# Patient Record
Sex: MALE | Race: WHITE | Employment: OTHER | URBAN - METROPOLITAN AREA
[De-identification: names, ages, dates, MRNs, and addresses within clinical notes are randomized per-mention and may not be internally consistent; named-entity substitution may affect disease eponyms.]

---

## 2017-01-04 DIAGNOSIS — L71.9 ROSACEA: Primary | ICD-10-CM

## 2017-01-04 NOTE — TELEPHONE ENCOUNTER
Reason for Call:  Patient calling to see if he can get refill of his Doxycycline filled today - he is a  and will be leaving. Requesting a call if it has been filled or not.    Best phone number to reach pt at is: [phone]  Ok to leave a message with medical info? yes    Pharmacy preferred (if calling for a refill): [facility]

## 2017-01-05 NOTE — TELEPHONE ENCOUNTER
doxycycline (VIBRAMYCIN) 100 MG capsule  Last Written Prescription Date:  12/3/2016  Last Fill Quantity: 30,   # refills: 0  Last Office Visit with G, P or [facility] prescribing provider: 11/17/2015  Future Office visit:    Next 5 appointments (look out 90 days)     Jan 13, 2017 10:30 AM   Return Visit with LILIANA Tan CNP   [facility] ([facility])    [address] [facility] [address] [phone]                   Was told last fill he is due for an OV for a PX   Please call pt   Thank you   Maria C Gambino RN, BSN  [facility]

## 2017-01-06 RX ORDER — DOXYCYCLINE 100 MG/1
100 CAPSULE ORAL 2 TIMES DAILY
Qty: 60 CAPSULE | Refills: 0 | Status: SHIPPED | OUTPATIENT
Start: 2017-01-06 | End: 2017-05-23

## 2017-01-06 NOTE — TELEPHONE ENCOUNTER
Called # [phone]    Left a non detailed VM     Maria C Gambino RN, BSN  [facility]

## 2017-01-06 NOTE — TELEPHONE ENCOUNTER
Pt called back in regards to message below.  I explained that the last time we saw him in office was 11/17/2015 and we need to see him once a year to be able to prescribe medications.  He is asking if his provider in [place] can fill this.  I told him I am not sure as she is with a specialty not primary care.  Please advise on if okay to be filled by provider at [facility].  He will be transferring call care to [place].  He can be reached at [phone].  OK to leave detailed message.  Maris Obrien  Patient

## 2017-01-06 NOTE — TELEPHONE ENCOUNTER
I do not prescribe anything other than what is diabetes related which does include his cholesterol and blood pressure meds.  I am willing to give him a one-time, one month supply to give him time to schedule an appointment with his primary care provider.  He will need to see his PCP anyway because there are other meds that are prescribed by his PCP that he will need refills on.  Sherri Mayer NP  Endocrinology

## 2017-01-09 NOTE — TELEPHONE ENCOUNTER
Left message for pt to call [facility] and a one mo only refill was sent to pharmacy.    I placed an appointment note on 1/13/17 OV note for Sherri's MA to verify pt received this message - that he needs to schedule with a PCP.   Abhinav Chery, GURINDER

## 2017-01-13 ENCOUNTER — OFFICE VISIT (OUTPATIENT)
Dept: ENDOCRINOLOGY | Facility: CLINIC | Age: 62
End: 2017-01-13
Payer: COMMERCIAL

## 2017-01-13 VITALS
DIASTOLIC BLOOD PRESSURE: 70 MMHG | WEIGHT: 268 LBS | RESPIRATION RATE: 14 BRPM | TEMPERATURE: 98.1 F | BODY MASS INDEX: 35.52 KG/M2 | HEIGHT: 73 IN | SYSTOLIC BLOOD PRESSURE: 138 MMHG | HEART RATE: 74 BPM

## 2017-01-13 DIAGNOSIS — Z79.4 TYPE 2 DIABETES MELLITUS WITH DIABETIC NEPHROPATHY, WITH LONG-TERM CURRENT USE OF INSULIN (H): Primary | ICD-10-CM

## 2017-01-13 DIAGNOSIS — E11.21 TYPE 2 DIABETES MELLITUS WITH DIABETIC NEPHROPATHY, WITH LONG-TERM CURRENT USE OF INSULIN (H): Primary | ICD-10-CM

## 2017-01-13 DIAGNOSIS — E78.5 HYPERLIPIDEMIA LDL GOAL <70: ICD-10-CM

## 2017-01-13 DIAGNOSIS — E11.21 TYPE 2 DIABETES MELLITUS WITH DIABETIC NEPHROPATHY, WITHOUT LONG-TERM CURRENT USE OF INSULIN (H): ICD-10-CM

## 2017-01-13 LAB — HBA1C MFR BLD: 8.4 % (ref 4.3–6)

## 2017-01-13 PROCEDURE — 83036 HEMOGLOBIN GLYCOSYLATED A1C: CPT | Performed by: CLINICAL NURSE SPECIALIST

## 2017-01-13 PROCEDURE — 36415 COLL VENOUS BLD VENIPUNCTURE: CPT | Performed by: CLINICAL NURSE SPECIALIST

## 2017-01-13 PROCEDURE — 80061 LIPID PANEL: CPT | Performed by: CLINICAL NURSE SPECIALIST

## 2017-01-13 PROCEDURE — 99213 OFFICE O/P EST LOW 20 MIN: CPT | Performed by: CLINICAL NURSE SPECIALIST

## 2017-01-13 RX ORDER — GLIPIZIDE 5 MG/1
10 TABLET, FILM COATED, EXTENDED RELEASE ORAL DAILY
Qty: 180 TABLET | Refills: 1 | Status: SHIPPED | OUTPATIENT
Start: 2017-01-13 | End: 2017-05-08

## 2017-01-13 NOTE — NURSING NOTE
"Chief Complaint   Patient presents with     RECHECK     diabetes       Initial /70 mmHg  Pulse 74  Temp(Src) 98.1  F (36.7  C)  Resp 14  Ht 1.854 m (6' 1\")  Wt 121.564 kg (268 lb)  BMI 35.37 kg/m2 Estimated body mass index is 35.37 kg/(m^2) as calculated from the following:    Height as of this encounter: 1.854 m (6' 1\").    Weight as of this encounter: 121.564 kg (268 lb).  BP completed using cuff size: irais Hoffman CMA        "

## 2017-01-13 NOTE — PROGRESS NOTES
Name: Francois Fontana  Seen at the request of Calixto Merino for Diabetes (Last seen 9/15/2016).  HPI:  Francois Fontana is a 61 year old male who presents for the evaluation/management of:    1. Type 2 DM:  Originally diagnosed in .  Was not symptomatic at the time, was diagnosed during routine labs.  Initially treated with glyburide, metformin was later added.  Actos was added 5 years ago.    Current Regimen: Metformin 1000 mg bid, glipizide XL 5 mg q am, Actos 45 mg qd, and Victoza 1.8 mg once daily.    BS checks: Not testing  Average Meter Download: didn't bring meter    Exercising daily - 30+ minutes.  Eating more bread again.    Complications:   Diabetes Complications  Description / Detail    Diabetic Retinopathy  No, last exam < 1 year ago   CAD / PAD  No   Neuropathy  No   Nephropathy / Microalbuminuria  Yes: elevated urine microalbumin noted for the first time 2015   Gastroparesis  No   Hypoglycemia Unawareness  No     2. Hypertension: Blood Pressure today:   BP Readings from Last 3 Encounters:   17 138/70   09/15/16 124/68   16 138/62   .  Blood pressure medications include Lisinopril/HCTZ 40/25 mg and Amlodipine 5 mg qd.  Takes medications everyday without forgetting a dose.  Denies feeling lightheaded or dizzy.   3. Hyperlipidemia: Takes Simvastatin 20 mg qd for lipid control.  Denies muscle aches of pains.       4. Prevention:  Flu Shot- 2016  Pneumovax- 2006  Opthalmology-Yes: 2016  Dental-  ASA-No, excessive bruising from ASA  Smoking- No  PMH/PSH:  Past Medical History   Diagnosis Date     Type II or unspecified type diabetes mellitus without mention of complication, not stated as uncontrolled      Abstracted 02     Unspecified essential hypertension      Rosacea      Past Surgical History   Procedure Laterality Date     Hernia repair, inguinal rt/lt       right     Family Hx:  Family History   Problem Relation Age of Onset     CANCER Father       of  "lung cancer     C.A.D. Father      Mult bypass operations     Type 2 Diabetes Father      Prostate Cancer Father      Respiratory Mother       of COPD     GASTROINTESTINAL DISEASE Brother      Hemochromatosis     GASTROINTESTINAL DISEASE Brother      Hemochromatosis     GASTROINTESTINAL DISEASE Brother      Hemochromatosis ( in 3 bros)     Thyroid disease:         DM2:         Autoimmune: DM1, SLE, RA, Vitiligo     Social Hx:  Social History     Social History     Marital Status:      Spouse Name: N/A     Number of Children: N/A     Years of Education: N/A     Occupational History     Not on file.     Social History Main Topics     Smoking status: Former Smoker     Quit date: 1985     Smokeless tobacco: Never Used      Comment: smoked socially     Alcohol Use: No      Comment: quit drinking age 30     Drug Use: No     Sexual Activity:     Partners: Female     Other Topics Concern     Parent/Sibling W/ Cabg, Mi Or Angioplasty Before 65f 55m? Yes     Social History Narrative          MEDICATIONS:  has a current medication list which includes the following prescription(s): glipizide, blood glucose monitoring, doxycycline, lisinopril-hydrochlorothiazide, amlodipine, pioglitazone, metformin, insulin pen needle, liraglutide, simvastatin, blood glucose monitoring, order for dme, triamcinolone, metronidazole, aspirin not prescribed, and triamcinolone.    ROS     ROS: 10 point ROS neg other than the symptoms noted above in the HPI.    Physical Exam   VS: /70 mmHg  Pulse 74  Temp(Src) 98.1  F (36.7  C)  Resp 14  Ht 1.854 m (6' 1\")  Wt 121.564 kg (268 lb)  BMI 35.37 kg/m2  GENERAL: NAD, well dressed, answering questions appropriately, appears stated age.  HEENT: OP clear, no exopthalmous, no proptosis, EOMI, no lig lag, no retraction, no scleral icterus  RESPIRATORY: Normal respiratory effort.  CARDIOVASCULAR: No peripheral edema.  EXTREMITIES: no edema, +pulses, no rashes, no lesions  NEUROLOGY: " CN grossly intact, no tremors  MSK: grossly intact, No digital cyanosis. Normal gait and station.  SKIN: no rashes, no lesions, no ulcers  PSYCH: Intact judgment and insight. A&OX3 with a cordial affect.    LABS:  A1c:   Component      Latest Ref Rng 1/15/2016 5/6/2016 9/15/2016 1/13/2017   Hemoglobin A1C      4.3 - 6.0 % 7.3 (H) 6.5 (H) 7.6 (H) 8.4 (H)     CMP:  !COMPREHENSIVE Latest Ref Rng 5/28/2015 5/6/2016   SODIUM 133 - 144 mmol/L 137 138   POTASSIUM 3.4 - 5.3 mmol/L 4.2 4.5   CHLORIDE 94 - 109 mmol/L 105 107   BUN 7 - 30 mg/dL 23 22   Creatinine 0.66 - 1.25 mg/dL 1.14 0.95   Glucose 70 - 99 mg/dL 164 (H) 120 (H)   ANION GAP 3 - 14 mmol/L 10 7   CALCIUM 8.5 - 10.1 mg/dL 8.8 8.8     LFTS/Cholesterol Panel:  !LIPID/HEPATIC Latest Ref Rng 12/19/2013 11/17/2015   CHOLESTEROL <200 mg/dL 154 149   TRIGLYCERIDES 0 - 150 mg/dL 221 (H) 196 (H)   HDL CHOLESTEROL >40 mg/dL 39 (L) 40 (L)   LDL CHOLESTEROL, CALCULATED 0 - 129 mg/dL 71 70   VLDL-CHOLESTEROL 0 - 30 mg/dL 44 (H) Pending   CHOLESTEROL/HDL RATIO 0.0 - 5.0 4.0 Pending   AST 0 - 55 U/L     ALT 0 - 70 U/L       Thyroid Function:   !THYROID Latest Ref Rng 5/6/2016 5/28/2015 6/12/2013 11/4/2010   TSH 0.40 - 4.00 mU/L 1.59 0.94 1.60 1.12     Urine MicroAlbumin:  Component    Latest Ref Rng 12/19/2013 5/28/2015 5/6/2016   Creatinine Urine     128 136 100   Microalbumin Urine     19 72 31   Microalbumin mg/g Cr    0 - 17 mg/g Cr 14.84 52.72 (H) 31.40 (H)     Vitamin D:    All pertinent notes, labs, and images personally reviewed by me.     A/P  Mr.Raymond COLETTE Fontana is a 61 year old here for the evaluation/management of diabetes:    1. DM2 - Unontrolled, today's A1c increased 6.5--> 7.6-->8.4%.   He's a  so he wants to avoid insulin if possible.  Will increase glipizide to 10 mg qd..  Continue current dose of Metformin, actos, and Victoza.   Encouraged more diligent dietary efforts - controlled carbs, limit between meal snacks.    There is some variability  among people, most will usually develop symptoms suggestive of hypoglycemia when blood glucose levels are lowered to the mid 60's. The first set of symptoms are called adrenergic. Patients may experience any of the following nervousness, sweating, intense hunger, trembling, weakness, palpitations, and difficulty speaking.   The acute management of hypoglycemia involves the rapid delivery of a source of easily absorbed sugar. Regular soda, juice, lifesavers, table sugar, are good options. 15 grams of glucose is the dose that is given, followed by an assessment of symptoms and a blood glucose check if possible. If after 10 minutes there is no improvement, another 10-15 grams should be given. This can be repeated up to three times. The equivalency of 10-15 grams of glucose (approximate servings) are: 3-5 hard candies, 3 teaspoons of sugar, or 1/2 cup of regular soda or juice.    4. Prevention:  Flu Shot- today  Pneumovax- 11/2/2006  Opthalmology-Yes: 11/17/2015  Dental-  ASA-No, excessive bruising from ASA  Smoking- No  2. Hypertension - Controlled.  Currently treated with Lisinopril/HCTZ 40/25 mg and Amlodipine 5 mg qd.  Continue current medications.    3. Hyperlipidemia - Currently treated with Simvastatin 20 mg qd.    Labs ordered today:   Orders Placed This Encounter   Procedures     Hemoglobin A1c     Lipid panel reflex to direct LDL       Radiology/Consults ordered today: None    All questions were answered.  The patient indicates understanding of the above issues and agrees with the plan set forth.  Total face to face time equal to or greater than 20 minutes.       Follow-up:  follow up in 3 month(s)  He'll come into clinic in 1 month for blood glucose meter upload for my review.    Sherri Mayer NP  Endocrinology  [facility]  CC: Calixto Merino

## 2017-01-14 LAB
CHOLEST SERPL-MCNC: 99 MG/DL
HDLC SERPL-MCNC: 43 MG/DL
LDLC SERPL CALC-MCNC: 28 MG/DL
NONHDLC SERPL-MCNC: 56 MG/DL
TRIGL SERPL-MCNC: 140 MG/DL

## 2017-01-14 NOTE — PROGRESS NOTES
Quick Note:    Ray,  Your cholesterol number look good.  Your A1c is higher. I'm sure you can get it back down again with diet efforts.  Take care,  Sherri Mayer NP  Endocrinology    ______

## 2017-02-01 ENCOUNTER — TELEPHONE (OUTPATIENT)
Dept: OTHER | Facility: CLINIC | Age: 62
End: 2017-02-01

## 2017-02-14 DIAGNOSIS — E78.5 HYPERLIPIDEMIA LDL GOAL <70: ICD-10-CM

## 2017-02-14 NOTE — TELEPHONE ENCOUNTER
Simvastatin 20 mg tab      Last Written Prescription Date: 05/20/16  Last Fill Quantity: 90, # refills: 1  Last Office Visit with G, P or [facility] prescribing provider: 01/13/17 Sherri Mayer        Lab Results   Component Value Date    CHOL 99 01/13/2017     Lab Results   Component Value Date    HDL 43 01/13/2017     Lab Results   Component Value Date    LDL 28 01/13/2017     Lab Results   Component Value Date    TRIG 140 01/13/2017     Lab Results   Component Value Date    CHOLHDLRATIO 3.7 11/17/2015

## 2017-02-15 RX ORDER — SIMVASTATIN 20 MG
20 TABLET ORAL AT BEDTIME
Qty: 90 TABLET | Refills: 1 | Status: SHIPPED | OUTPATIENT
Start: 2017-02-15 | End: 2017-07-28

## 2017-02-16 DIAGNOSIS — L71.9 ROSACEA: ICD-10-CM

## 2017-02-16 RX ORDER — DOXYCYCLINE 100 MG/1
100 CAPSULE ORAL 2 TIMES DAILY
Qty: 60 CAPSULE | Refills: 0 | Status: CANCELLED | OUTPATIENT
Start: 2017-02-16

## 2017-02-16 NOTE — TELEPHONE ENCOUNTER
Doxycycline      Last Written Prescription Date: 1/06/17  Last Fill Quantity: 60,  # refills: 0   Last Office Visit with [facility], P or [facility] prescribing provider: 2/14/17 Madan Mayer

## 2017-02-17 NOTE — TELEPHONE ENCOUNTER
Fax to pharmacy.  Sherri gave one time refill last time but this is not Endo med.  This needs to be sent to his primary provider.  Med denied.  GURINDER Peña Jean M, RN        8:06 AM   Note      Left message for pt to call [facility] and a one mo only refill was sent to pharmacy.   I placed an appointment note on 1/13/17 OV note for Sherri's MA to verify pt received this message - that he needs to schedule with a PCP.   Abhinav Chery RN            January 6, 2017            3:24 PM   Sherri Mayer, LILIANA HEBERT routed this conversation to Maris Obrien Linda Diane, APRN CNP        3:19 PM   Note      I do not prescribe anything other than what is diabetes related which does include his cholesterol and blood pressure meds. I am willing to give him a one-time, one month supply to give him time to schedule an appointment with his primary care provider. He will need to see his PCP anyway because there are other meds that are prescribed by his PCP that he will need refills on.  Sherri Mayer NP  Endocrinology

## 2017-05-08 DIAGNOSIS — E11.21 TYPE 2 DIABETES MELLITUS WITH DIABETIC NEPHROPATHY, WITH LONG-TERM CURRENT USE OF INSULIN (H): ICD-10-CM

## 2017-05-08 DIAGNOSIS — E78.5 HYPERLIPIDEMIA LDL GOAL <70: ICD-10-CM

## 2017-05-08 DIAGNOSIS — I10 ESSENTIAL HYPERTENSION WITH GOAL BLOOD PRESSURE LESS THAN 140/90: ICD-10-CM

## 2017-05-08 DIAGNOSIS — Z79.4 TYPE 2 DIABETES MELLITUS WITH DIABETIC NEPHROPATHY, WITH LONG-TERM CURRENT USE OF INSULIN (H): ICD-10-CM

## 2017-05-08 NOTE — TELEPHONE ENCOUNTER
Amlodipine 5mg tab      Last Written Prescription Date: 12/06/16  Last Fill Quantity: 90, # refills: 1    [facility] Office Visit with [facility] or [facility] prescribing provider:  01/13/17   Future Office Visit:        BP Readings from Last 3 Encounters:   01/13/17 138/70   09/15/16 124/68   05/20/16 138/62     Lisinopril 20-12.5mg tab      Last Written Prescription Date: 12/06/16  Last Fill Quantity: 180, # refills: 1  Last Office Visit with [facility] or [facility] prescribing provider: 01/13/17       Potassium   Date Value Ref Range Status   05/06/2016 4.5 3.4 - 5.3 mmol/L Final     Creatinine   Date Value Ref Range Status   05/06/2016 0.95 0.66 - 1.25 mg/dL Final     BP Readings from Last 3 Encounters:   01/13/17 138/70   09/15/16 124/68   05/20/16 138/62

## 2017-05-08 NOTE — TELEPHONE ENCOUNTER
Pioglitazone 45mg tab         Last Written Prescription Date: 09/15/16  Last Fill Quantity: 90, # refills: 1  Last Office Visit with G, P or [facility] prescribing provider:  01/13/17        BP Readings from Last 3 Encounters:   01/13/17 138/70   09/15/16 124/68   05/20/16 138/62     Lab Results   Component Value Date    MICROL 31 05/06/2016     Lab Results   Component Value Date    UMALCR 31.40 05/06/2016     Creatinine   Date Value Ref Range Status   05/06/2016 0.95 0.66 - 1.25 mg/dL Final   ]  GFR Estimate   Date Value Ref Range Status   05/06/2016 81 >60 mL/min/1.7m2 Final     Comment:     Non  GFR Calc   05/28/2015 66 >60 mL/min/1.7m2 Final     Comment:     Non  GFR Calc   06/17/2014 77 >60 mL/min/1.7m2 Final     GFR Estimate If Black   Date Value Ref Range Status   05/06/2016 >90   GFR Calc   >60 mL/min/1.7m2 Final   05/28/2015 80 >60 mL/min/1.7m2 Final     Comment:      GFR Calc   06/17/2014 >90 >60 mL/min/1.7m2 Final     Lab Results   Component Value Date    CHOL 99 01/13/2017     Lab Results   Component Value Date    HDL 43 01/13/2017     Lab Results   Component Value Date    LDL 28 01/13/2017     Lab Results   Component Value Date    TRIG 140 01/13/2017     Lab Results   Component Value Date    CHOLHDLRATIO 3.7 11/17/2015     Lab Results   Component Value Date    AST 20 05/06/2016     Lab Results   Component Value Date    ALT 36 05/06/2016     Lab Results   Component Value Date    A1C 8.4 01/13/2017    A1C 7.6 09/15/2016    A1C 6.5 05/06/2016    A1C 7.3 01/15/2016    A1C 8.3 12/18/2015     Potassium   Date Value Ref Range Status   05/06/2016 4.5 3.4 - 5.3 mmol/L Final

## 2017-05-08 NOTE — TELEPHONE ENCOUNTER
Easy touch 40dd7-04 mis      Last Written Prescription Date: 09/15/16  Last Fill Quantity: 100,  # refills: 2   Last Office Visit with [facility], [facility] or [facility] prescribing provider: 01/13/17

## 2017-05-08 NOTE — LETTER
[facility]  [address] [place], [address]  [phone]        May 11, 2017    Francois Fontana                                                                                                                              [address]  [facility] [address]            We recently received a call from your pharmacy requesting a refill of multiple.     A review of your chart indicates that an appointment is required with your provider for diabetic visit per Emily Baltazar. Please call the clinic at [phone] to schedule your appointment.     We have authorized a one month refill of your medication to allow time for you to schedule your appointment.      Taking care of your health is important to us and ongoing visits with your provider are vital to your care.  We look forward to seeing you in the near future.     Sincerely,     [facility]

## 2017-05-08 NOTE — TELEPHONE ENCOUNTER
Glipizide 5mg tab         Last Written Prescription Date: 01/13/17  Last Fill Quantity: 180, # refills: 1  Last Office Visit with G, P or [facility] prescribing provider:  01/13/17        BP Readings from Last 3 Encounters:   01/13/17 138/70   09/15/16 124/68   05/20/16 138/62     Lab Results   Component Value Date    MICROL 31 05/06/2016     Lab Results   Component Value Date    UMALCR 31.40 05/06/2016     Creatinine   Date Value Ref Range Status   05/06/2016 0.95 0.66 - 1.25 mg/dL Final   ]  GFR Estimate   Date Value Ref Range Status   05/06/2016 81 >60 mL/min/1.7m2 Final     Comment:     Non  GFR Calc   05/28/2015 66 >60 mL/min/1.7m2 Final     Comment:     Non  GFR Calc   06/17/2014 77 >60 mL/min/1.7m2 Final     GFR Estimate If Black   Date Value Ref Range Status   05/06/2016 >90   GFR Calc   >60 mL/min/1.7m2 Final   05/28/2015 80 >60 mL/min/1.7m2 Final     Comment:      GFR Calc   06/17/2014 >90 >60 mL/min/1.7m2 Final     Lab Results   Component Value Date    CHOL 99 01/13/2017     Lab Results   Component Value Date    HDL 43 01/13/2017     Lab Results   Component Value Date    LDL 28 01/13/2017     Lab Results   Component Value Date    TRIG 140 01/13/2017     Lab Results   Component Value Date    CHOLHDLRATIO 3.7 11/17/2015     Lab Results   Component Value Date    AST 20 05/06/2016     Lab Results   Component Value Date    ALT 36 05/06/2016     Lab Results   Component Value Date    A1C 8.4 01/13/2017    A1C 7.6 09/15/2016    A1C 6.5 05/06/2016    A1C 7.3 01/15/2016    A1C 8.3 12/18/2015     Potassium   Date Value Ref Range Status   05/06/2016 4.5 3.4 - 5.3 mmol/L Final

## 2017-05-09 NOTE — TELEPHONE ENCOUNTER
Routing refill request to provider for review/approval because:  Labs out of range:  A1c recommended q 3 mo when over 8  Labs not current:  K+ creat, microalb, GFR, AST, ALT, A1c    Yue Duckworth RN, BS  Clinical Nurse Triage.

## 2017-05-10 RX ORDER — GLIPIZIDE 5 MG/1
10 TABLET, FILM COATED, EXTENDED RELEASE ORAL DAILY
Qty: 60 TABLET | Refills: 0 | Status: SHIPPED | OUTPATIENT
Start: 2017-05-10 | End: 2017-07-07

## 2017-05-10 RX ORDER — PIOGLITAZONEHYDROCHLORIDE 45 MG/1
45 TABLET ORAL DAILY
Qty: 30 TABLET | Refills: 0 | Status: SHIPPED | OUTPATIENT
Start: 2017-05-10 | End: 2017-07-07

## 2017-05-10 RX ORDER — LISINOPRIL AND HYDROCHLOROTHIAZIDE 12.5; 2 MG/1; MG/1
TABLET ORAL
Qty: 60 TABLET | Refills: 0 | Status: SHIPPED | OUTPATIENT
Start: 2017-05-10 | End: 2017-07-07

## 2017-05-10 RX ORDER — AMLODIPINE BESYLATE 5 MG/1
5 TABLET ORAL DAILY
Qty: 30 TABLET | Refills: 0 | Status: SHIPPED | OUTPATIENT
Start: 2017-05-10 | End: 2017-07-07

## 2017-05-10 NOTE — TELEPHONE ENCOUNTER
Please advise patient is due for follow up.  Provided one month supply of requested meds.  Can update prescriptions at his follow up visit.  Sherri Mayer NP  Endocrinology

## 2017-05-23 ENCOUNTER — OFFICE VISIT (OUTPATIENT)
Dept: FAMILY MEDICINE | Facility: CLINIC | Age: 62
End: 2017-05-23
Payer: COMMERCIAL

## 2017-05-23 VITALS
RESPIRATION RATE: 16 BRPM | BODY MASS INDEX: 36.28 KG/M2 | DIASTOLIC BLOOD PRESSURE: 70 MMHG | WEIGHT: 275 LBS | TEMPERATURE: 98.6 F | OXYGEN SATURATION: 97 % | SYSTOLIC BLOOD PRESSURE: 126 MMHG | HEART RATE: 93 BPM

## 2017-05-23 DIAGNOSIS — Z12.11 ENCOUNTER FOR SCREENING COLONOSCOPY: Primary | ICD-10-CM

## 2017-05-23 DIAGNOSIS — L71.9 ROSACEA: ICD-10-CM

## 2017-05-23 DIAGNOSIS — R21 RASH: ICD-10-CM

## 2017-05-23 PROCEDURE — 99213 OFFICE O/P EST LOW 20 MIN: CPT | Performed by: NURSE PRACTITIONER

## 2017-05-23 RX ORDER — METRONIDAZOLE 10 MG/G
GEL TOPICAL DAILY
Qty: 60 G | Refills: 11 | Status: SHIPPED | OUTPATIENT
Start: 2017-05-23 | End: 2021-04-19

## 2017-05-23 RX ORDER — TRIAMCINOLONE ACETONIDE 1 MG/G
CREAM TOPICAL
Qty: 15 G | Refills: 0 | Status: SHIPPED | OUTPATIENT
Start: 2017-05-23 | End: 2017-07-05

## 2017-05-23 RX ORDER — DOXYCYCLINE 100 MG/1
100 CAPSULE ORAL 2 TIMES DAILY
Qty: 180 CAPSULE | Refills: 3 | Status: SHIPPED | OUTPATIENT
Start: 2017-05-23 | End: 2020-06-26

## 2017-05-23 NOTE — PROGRESS NOTES
SUBJECTIVE:                                                    Francois Fontana is a 61 year old male who presents to clinic today for the following health issues:       Patient is here with concerns about rosacea. Diagnosed with the skin condition and about 6 years ago and has been taking doxycycline daily to keep  Symptoms under control. Patient has been out of medication for 1 week and has large  , erythematous patches on his face and forehead. Is requesting a refill of medication at this time.  Typically sees Dr. Merino in [place].  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         Problem list and histories reviewed & adjusted, as indicated.  Additional history: none    Patient Active Problem List   Diagnosis     Type 2 diabetes mellitus with hyperglycemia; A1c goal <7     Hypertension goal BP (blood pressure) < 140/90     Rosacea     Hyperlipidemia LDL goal <70     Stenosis of left subclavian artery (H)     Advanced directives, counseling/discussion     Family history of prostate cancer in father      Morbid obesity due to excess calories (H)     JUSTIN (obstructive sleep apnea)     Type 2 diabetes mellitus with diabetic nephropathy (H)     Past Surgical History:   Procedure Laterality Date     HERNIA REPAIR, INGUINAL RT/LT      right       Social History   Substance Use Topics     Smoking status: Former Smoker     Quit date: 1985     Smokeless tobacco: Never Used      Comment: smoked socially     Alcohol use No      Comment: quit drinking age 30     Family History   Problem Relation Age of Onset     CANCER Father       of lung cancer     C.A.D. Father      Mult bypass operations     Type 2 Diabetes Father      Prostate Cancer Father      Respiratory Mother       of COPD     GASTROINTESTINAL DISEASE Brother      Hemochromatosis     GASTROINTESTINAL  DISEASE Brother      Hemochromatosis     GASTROINTESTINAL DISEASE Brother      Hemochromatosis ( in 3 bros)           Reviewed and updated as needed this visit by clinical staff  Tobacco  Allergies  Meds  Problems  Med Hx  Surg Hx  Fam Hx  Soc Hx        Reviewed and updated as needed this visit by Provider  Allergies  Meds  Problems  Med Hx  Surg Hx  Fam Hx         ROS:  Constitutional, HEENT, cardiovascular, pulmonary, gi and gu systems are negative, except as otherwise noted.    OBJECTIVE:                                                    /70 (BP Location: Right arm, Patient Position: Chair, Cuff Size: Adult Large)  Pulse 93  Temp 98.6  F (37  C) (Oral)  Resp 16  Wt 275 lb (124.7 kg)  SpO2 97%  BMI 36.28 kg/m2  Body mass index is 36.28 kg/(m^2).  GENERAL: healthy, alert and no distress  RESP: lungs clear to auscultation - no rales, rhonchi or wheezes  CV: regular rate and rhythm, normal S1 S2, no S3 or S4, no murmur, click or rub, no peripheral edema and peripheral pulses strong  SKIN: erythematous lesions on bilateral cheeks and forehead. Area of erythematous scaling on lower legs.   PSYCH: mentation appears normal, affect normal/bright         ASSESSMENT/PLAN:                                                            1. Rosacea  Will  treat with what Dr. Merino has done in the past since effective at managing. .   - doxycycline (VIBRAMYCIN) 100 MG capsule; Take 1 capsule (100 mg) by mouth 2 times daily  Dispense: 180 capsule; Refill: 3  - metroNIDAZOLE (METROGEL) 1 % gel; Apply topically daily  Dispense: 60 g; Refill: 11    2. Encounter for screening colonoscopy  Patient has agreed to the FIT test.   - Fecal colorectal cancer screen FIT; Future    3. Rash   Triamcinolone cream for rash on lower legs.   - triamcinolone (KENALOG) 0.1 % cream; Apply sparingly to affected area three times daily for 14 days.  Dispense: 15 g; Refill: 0    Follow up with endocrine or PCP if no improvement.      Denia Pope, NP  [facility]

## 2017-05-23 NOTE — NURSING NOTE
"Chief Complaint   Patient presents with     Derm Problem       Initial /70 (BP Location: Right arm, Patient Position: Chair, Cuff Size: Adult Large)  Pulse 93  Temp 98.6  F (37  C) (Oral)  Resp 16  Wt 275 lb (124.7 kg)  SpO2 97%  BMI 36.28 kg/m2 Estimated body mass index is 36.28 kg/(m^2) as calculated from the following:    Height as of 1/13/17: 6' 1\" (1.854 m).    Weight as of this encounter: 275 lb (124.7 kg).  Medication Reconciliation: complete     Yazan Cancino CMA    Gave handout on FIT test on 5/23/2017.Yazan Cancino CMA, declined    "

## 2017-05-23 NOTE — MR AVS SNAPSHOT
After Visit Summary   5/23/2017    Francois Fontana    MRN: [id number]           Patient Information     Date Of Birth          1955        Visit Information        Provider Department      5/23/2017 1:30 PM Denia Pope NP [facility]        Today's Diagnoses     Encounter for screening colonoscopy    -  1    Rosacea        Rash           Follow-ups after your visit        Future tests that were ordered for you today     Open Future Orders        Priority Expected Expires Ordered    Fecal colorectal cancer screen FIT Routine 6/13/2017 8/15/2017 5/23/2017            Who to contact     If you have questions or need follow up information about today's clinic visit or your schedule please contact [facility] directly at [phone].  Normal or non-critical lab and imaging results will be communicated to you by [employer], letter or phone within 4 business days after the clinic has received the results. If you do not hear from us within 7 days, please contact the clinic through [employer] or phone. If you have a critical or abnormal lab result, we will notify you by phone as soon as possible.  Submit refill requests through [employer] or call your pharmacy and they will forward the refill request to us. Please allow 3 business days for your refill to be completed.          Additional Information About Your Visit        MyChart Information     [employer] gives you secure access to your electronic health record. If you see a primary care provider, you can also send messages to your care team and make appointments. If you have questions, please call your primary care clinic.  If you do not have a primary care provider, please call [phone] and they will assist you.        Care EveryWhere ID     This is your Care EveryWhere ID. This could be used by other organizations to access your [facility] medical records  [id number]        Your Vitals Were     Pulse Temperature Respirations  Pulse Oximetry BMI (Body Mass Index)       93 98.6  F (37  C) (Oral) 16 97% 36.28 kg/m2        Blood Pressure from Last 3 Encounters:   05/23/17 126/70   01/13/17 138/70   09/15/16 124/68    Weight from Last 3 Encounters:   05/23/17 275 lb (124.7 kg)   01/13/17 268 lb (121.6 kg)   09/15/16 252 lb (114.3 kg)                 Today's Medication Changes          These changes are accurate as of: 5/23/17  1:53 PM.  If you have any questions, ask your nurse or doctor.               These medicines have changed or have updated prescriptions.        Dose/Directions    * metroNIDAZOLE 0.75 % topical gel   Commonly known as:  METROGEL   This may have changed:  Another medication with the same name was added. Make sure you understand how and when to take each.   Used for:  Rosacea   Changed by:  Calixto Merino MD        APPLY TOPICALLY TWICE DAILY   Quantity:  45 g   Refills:  3       * metroNIDAZOLE 1 % gel   Commonly known as:  METROGEL   This may have changed:  You were already taking a medication with the same name, and this prescription was added. Make sure you understand how and when to take each.   Used for:  Rosacea   Changed by:  Denia Pope NP        Apply topically daily   Quantity:  60 g   Refills:  11       * triamcinolone 0.1 % cream   Commonly known as:  KENALOG   This may have changed:  Another medication with the same name was added. Make sure you understand how and when to take each.   Used for:  Dermatitis   Changed by:  Calixto Merino MD        Apply  topically 2 times daily as needed.   Quantity:  80 g   Refills:  3       * triamcinolone 0.1 % cream   Commonly known as:  KENALOG   This may have changed:  Another medication with the same name was added. Make sure you understand how and when to take each.   Used for:  Rosacea   Changed by:  Calixto Merino MD        APPLY EXTERNALLY TO AFFECTED AREA THREE TIMES DAILY AS NEEDED   Quantity:  80 g   Refills:  5       * triamcinolone 0.1 % cream    Commonly known as:  KENALOG   This may have changed:  You were already taking a medication with the same name, and this prescription was added. Make sure you understand how and when to take each.   Used for:  Rash   Changed by:  Denia Pope NP        Apply sparingly to affected area three times daily for 14 days.   Quantity:  15 g   Refills:  0       * Notice:  This list has 5 medication(s) that are the same as other medications prescribed for you. Read the directions carefully, and ask your doctor or other care provider to review them with you.         Where to get your medicines      Some of these will need a paper prescription and others can be bought over the counter.  Ask your nurse if you have questions.     Bring a paper prescription for each of these medications     doxycycline 100 MG capsule    metroNIDAZOLE 1 % gel    triamcinolone 0.1 % cream                Primary Care Provider Office Phone # Fax #    Denia Pope [phone]       [facility] [address] [place] [id number]        Thank you!     Thank you for choosing [facility]  for your care. Our goal is always to provide you with excellent care. Hearing back from our patients is one way we can continue to improve our services. Please take a few minutes to complete the written survey that you may receive in the mail after your visit with us. Thank you!             Your Updated Medication List - Protect others around you: Learn how to safely use, store and throw away your medicines at www.disposemymeds.org.          This list is accurate as of: 5/23/17  1:53 PM.  Always use your most recent med list.                   Brand Name Dispense Instructions for use    amLODIPine 5 MG tablet    NORVASC    30 tablet    Take 1 tablet (5 mg) by mouth daily       ASPIRIN NOT PRESCRIBED    INTENTIONAL    0 each    1 each continuous prn for other Antiplatelet medication not prescribed intentionally due to  bruising excessively       blood glucose monitoring lancets     3 Box    Use to test blood sugar 3 times daily or as directed.       blood glucose monitoring test strip    ACCU-CHEK SMARTVIEW    300 each    Use to test blood sugar 3 times daily or as directed.       doxycycline 100 MG capsule    VIBRAMYCIN    180 capsule    Take 1 capsule (100 mg) by mouth 2 times daily       glipiZIDE 5 MG 24 hr tablet    glipiZIDE XL    60 tablet    Take 2 tablets (10 mg) by mouth daily       insulin pen needle 31G X 5 MM    B-D U/F    100 each    Use 1 daily or as directed.       liraglutide 18 MG/3ML soln    VICTOZA    27 mL    Inject 1.8 mg Subcutaneous daily       lisinopril-hydrochlorothiazide 20-12.5 MG per tablet    PRINZIDE/ZESTORETIC    60 tablet    TAKE TWO TABLETS BY MOUTH ONCE DAILY       metFORMIN 1000 MG tablet    GLUCOPHAGE    360 tablet    Take 1 tablet (1,000 mg) by mouth 2 times daily (with meals)       * metroNIDAZOLE 0.75 % topical gel    METROGEL    45 g    APPLY TOPICALLY TWICE DAILY       * metroNIDAZOLE 1 % gel    METROGEL    60 g    Apply topically daily       order for DME      Patient Francois Fontana was set up at [place] on September 23, 2015. Patient received a [employer] DreamStation Auto CPAP Auto. Pressures were set at Auto 6 - 12 cm H2O.   Patient?s ramp is 5 cm H2O for 30 min and FLEX/EPR is A Flex.  Patient received a [employer] Mask name: Wisp  Nasal mask Size Small, Medium, Large, heated tubing and heated humidifier.  Patient is enrolled in the STM Program and does need to meet compliance. Patient has a follow up on 11/4/15 with Bennett Goltz, PA.   Myra Lmi (entered by Nitin Wilson)       pioglitazone 45 MG tablet    ACTOS    30 tablet    Take 1 tablet (45 mg) by mouth daily       simvastatin 20 MG tablet    ZOCOR    90 tablet    Take 1 tablet (20 mg) by mouth At Bedtime       * triamcinolone 0.1 % cream    KENALOG    80 g    Apply  topically 2 times daily as  needed.       * triamcinolone 0.1 % cream    KENALOG    80 g    APPLY EXTERNALLY TO AFFECTED AREA THREE TIMES DAILY AS NEEDED       * triamcinolone 0.1 % cream    KENALOG    15 g    Apply sparingly to affected area three times daily for 14 days.       * Notice:  This list has 5 medication(s) that are the same as other medications prescribed for you. Read the directions carefully, and ask your doctor or other care provider to review them with you.

## 2017-06-21 ENCOUNTER — TELEPHONE (OUTPATIENT)
Dept: FAMILY MEDICINE | Facility: CLINIC | Age: 62
End: 2017-06-21

## 2017-06-21 NOTE — TELEPHONE ENCOUNTER
Panel Management Review      Patient has the following on his problem list:     Diabetes    ASA: Failed    Last A1C  Lab Results   Component Value Date    A1C 8.4 01/13/2017    A1C 7.6 09/15/2016    A1C 6.5 05/06/2016    A1C 7.3 01/15/2016    A1C 8.3 12/18/2015     A1C tested: FAILED    Last LDL:    Lab Results   Component Value Date    CHOL 99 01/13/2017     Lab Results   Component Value Date    HDL 43 01/13/2017     Lab Results   Component Value Date    LDL 28 01/13/2017     Lab Results   Component Value Date    TRIG 140 01/13/2017     Lab Results   Component Value Date    CHOLHDLRATIO 3.7 11/17/2015     Lab Results   Component Value Date    NHDL 56 01/13/2017       Is the patient on a Statin? YES             Is the patient on Aspirin? NO    Medications     HMG CoA Reductase Inhibitors    simvastatin (ZOCOR) 20 MG tablet          Last three blood pressure readings:  BP Readings from Last 3 Encounters:   05/23/17 126/70   01/13/17 138/70   09/15/16 124/68       Date of last diabetes office visit: 11/18/2015     Tobacco History:     History   Smoking Status     Former Smoker     Quit date: 7/22/1985   Smokeless Tobacco     Never Used     Comment: smoked socially           IVD   ASA: FAILED    Last LDL:    Lab Results   Component Value Date    CHOL 99 01/13/2017     Lab Results   Component Value Date    HDL 43 01/13/2017     Lab Results   Component Value Date    LDL 28 01/13/2017     Lab Results   Component Value Date    TRIG 140 01/13/2017        Lab Results   Component Value Date    CHOLHDLRATIO 3.7 11/17/2015        Is the patient on a Statin? YES   Is the patient on Aspirin? NO                  Medications     HMG CoA Reductase Inhibitors    simvastatin (ZOCOR) 20 MG tablet          Last three blood pressure readings:  BP Readings from Last 3 Encounters:   05/23/17 126/70   01/13/17 138/70   09/15/16 124/68        Tobacco History:     History   Smoking Status     Former Smoker     Quit date: 7/22/1985    Smokeless Tobacco     Never Used     Comment: smoked socially       Hypertension   Last three blood pressure readings:  BP Readings from Last 3 Encounters:   05/23/17 126/70   01/13/17 138/70   09/15/16 124/68     Blood pressure: Passed    HTN Guidelines:  Age 18-59 BP range:  Less than 140/90  Age 60-85 with Diabetes:  Less than 140/90  Age 60-85 without Diabetes:  less than 150/90      Composite cancer screening  Chart review shows that this patient is due/due soon for the following Colonoscopy  Summary:    Patient is due/failing the following:   A1C and COLONOSCOPY. Pt needs a DM visit     Action needed:   Patient needs office visit for DM. and Patient needs referral/order: colonoscopy     Type of outreach:    Phone, left message for patient to call back.     Questions for provider review:    None                                                                                                                                    ROCK Negrete       Chart routed to  .

## 2017-06-21 NOTE — LETTER
[facility]          [address] [place] [address]                                                                                                       [phone]      Francois Fontana  [address] [facility] [address]      Dear Francois,    Our records indicate that you may be due for preventative health care services. Please make an appointment or call to set up the following tests as recommended. If you have already had this testing done at another clinic please contact us to help us update our records to reflect the preventative care that you have had.    Colon cancer screen (colonoscopy) - Recommended every ten years for everyone age 50 and older. Screening is done by a colonoscopy every 10 years starting at age 50 or earlier if you have a family history of colon cancer.  If you cannot or do not want to have a colonoscopy you can opt to do an annual fecal occult blood immunoassay test (FIT stool test). We strongly urge our patients to consider having a colonoscopy done, which is the best screening test available and only needs to be done every 10 years if normal.     At this time you are also do for a diabetes physical.                                                                                                                                              Thank you for choosing [facility]. We appreciate the opportunity to serve you and look forward to supporting your healthcare needs in the future.    If you have any questions or concerns, please contact us at [phone]      Sincerely,        MINH Lopes

## 2017-07-05 DIAGNOSIS — R21 RASH: ICD-10-CM

## 2017-07-05 NOTE — TELEPHONE ENCOUNTER
Pending Prescriptions:                       Disp   Refills    triamcinolone (KENALOG) 0.1 % cream [Phar*       0            Sig: APPLY SPARINGLY TO AFFECTED AREA THREE TIMES           DAILY FOR 14 DAYS          Last Written Prescription Date: 05/23/2017  Last Fill Quantity: 15g,  # refills: 0   Last Office Visit with [facility], [facility] or [facility] prescribing provider: 05/23/2017                                             Calixto MATHIS

## 2017-07-07 DIAGNOSIS — E11.21 TYPE 2 DIABETES MELLITUS WITH DIABETIC NEPHROPATHY, WITH LONG-TERM CURRENT USE OF INSULIN (H): ICD-10-CM

## 2017-07-07 DIAGNOSIS — E78.5 HYPERLIPIDEMIA LDL GOAL <70: ICD-10-CM

## 2017-07-07 DIAGNOSIS — Z79.4 TYPE 2 DIABETES MELLITUS WITH DIABETIC NEPHROPATHY, WITH LONG-TERM CURRENT USE OF INSULIN (H): ICD-10-CM

## 2017-07-07 DIAGNOSIS — I10 ESSENTIAL HYPERTENSION WITH GOAL BLOOD PRESSURE LESS THAN 140/90: ICD-10-CM

## 2017-07-07 NOTE — TELEPHONE ENCOUNTER
Amlodipine 5 mg       Last Written Prescription Date: 05/10/17  Last Fill Quantity: 30, # refills: 0    Last Office Visit with [facility], [facility] or [facility] prescribing provider:  01/13/17 Sherri Mayer    Future Office Visit:        BP Readings from Last 3 Encounters:   05/23/17 126/70   01/13/17 138/70   09/15/16 124/68     Glipizide 5 mg          Last Written Prescription Date: 05/10/17  Last Fill Quantity: 60, # refills: 0  Last Office Visit with [facility], [facility] or [facility] prescribing provider:  01/13/17 Sherri Mayer         BP Readings from Last 3 Encounters:   05/23/17 126/70   01/13/17 138/70   09/15/16 124/68     Lab Results   Component Value Date    MICROL 31 05/06/2016     Lab Results   Component Value Date    UMALCR 31.40 05/06/2016     Creatinine   Date Value Ref Range Status   05/06/2016 0.95 0.66 - 1.25 mg/dL Final   ]  GFR Estimate   Date Value Ref Range Status   05/06/2016 81 >60 mL/min/1.7m2 Final     Comment:     Non  GFR Calc   05/28/2015 66 >60 mL/min/1.7m2 Final     Comment:     Non  GFR Calc   06/17/2014 77 >60 mL/min/1.7m2 Final     GFR Estimate If Black   Date Value Ref Range Status   05/06/2016 >90   GFR Calc   >60 mL/min/1.7m2 Final   05/28/2015 80 >60 mL/min/1.7m2 Final     Comment:      GFR Calc   06/17/2014 >90 >60 mL/min/1.7m2 Final     Lab Results   Component Value Date    CHOL 99 01/13/2017     Lab Results   Component Value Date    HDL 43 01/13/2017     Lab Results   Component Value Date    LDL 28 01/13/2017     Lab Results   Component Value Date    TRIG 140 01/13/2017     Lab Results   Component Value Date    CHOLHDLRATIO 3.7 11/17/2015     Lab Results   Component Value Date    AST 20 05/06/2016     Lab Results   Component Value Date    ALT 36 05/06/2016     Lab Results   Component Value Date    A1C 8.4 01/13/2017    A1C 7.6 09/15/2016    A1C 6.5 05/06/2016    A1C 7.3 01/15/2016    A1C 8.3 12/18/2015     Potassium   Date Value Ref Range  Status   05/06/2016 4.5 3.4 - 5.3 mmol/L Final     Lisinopril 20-12.5 mg       Last Written Prescription Date: 05/10/17  Last Fill Quantity: 60, # refills: 0  Last Office Visit with [facility], [facility] or [facility] prescribing provider: 01/13/17 Sherri Cortez        Potassium   Date Value Ref Range Status   05/06/2016 4.5 3.4 - 5.3 mmol/L Final     Creatinine   Date Value Ref Range Status   05/06/2016 0.95 0.66 - 1.25 mg/dL Final     BP Readings from Last 3 Encounters:   05/23/17 126/70   01/13/17 138/70   09/15/16 124/68     Pioglitazone 45 mg          Last Written Prescription Date: 05/10/17  Last Fill Quantity: 30, # refills: 0  Last Office Visit with [facility], [facility] or [facility] prescribing provider:  01/13/17 Sherri Cortez         BP Readings from Last 3 Encounters:   05/23/17 126/70   01/13/17 138/70   09/15/16 124/68     Lab Results   Component Value Date    MICROL 31 05/06/2016     Lab Results   Component Value Date    UMALCR 31.40 05/06/2016     Creatinine   Date Value Ref Range Status   05/06/2016 0.95 0.66 - 1.25 mg/dL Final   ]  GFR Estimate   Date Value Ref Range Status   05/06/2016 81 >60 mL/min/1.7m2 Final     Comment:     Non  GFR Calc   05/28/2015 66 >60 mL/min/1.7m2 Final     Comment:     Non  GFR Calc   06/17/2014 77 >60 mL/min/1.7m2 Final     GFR Estimate If Black   Date Value Ref Range Status   05/06/2016 >90   GFR Calc   >60 mL/min/1.7m2 Final   05/28/2015 80 >60 mL/min/1.7m2 Final     Comment:      GFR Calc   06/17/2014 >90 >60 mL/min/1.7m2 Final     Lab Results   Component Value Date    CHOL 99 01/13/2017     Lab Results   Component Value Date    HDL 43 01/13/2017     Lab Results   Component Value Date    LDL 28 01/13/2017     Lab Results   Component Value Date    TRIG 140 01/13/2017     Lab Results   Component Value Date    CHOLHDLRATIO 3.7 11/17/2015     Lab Results   Component Value Date    AST 20 05/06/2016     Lab Results   Component Value Date     ALT 36 05/06/2016     Lab Results   Component Value Date    A1C 8.4 01/13/2017    A1C 7.6 09/15/2016    A1C 6.5 05/06/2016    A1C 7.3 01/15/2016    A1C 8.3 12/18/2015     Potassium   Date Value Ref Range Status   05/06/2016 4.5 3.4 - 5.3 mmol/L Final

## 2017-07-07 NOTE — LETTER
[facility]  [address] [place], [address]  [phone]        July 10, 2017    Francois Fontana                                                                                                                              [id number]  [facility] [address]            We recently received a call from your pharmacy requesting a refill of multiple.     A review of your chart indicates that an appointment is required with your provider for a diabetic visit with Emily Baltazar. Please call the clinic at [phone] to schedule your appointment.     We have authorized one refill of your medication to allow time for you to schedule your appointment.      Taking care of your health is important to us and ongoing visits with your provider are vital to your care.  We look forward to seeing you in the near future.     Sincerely,     [facility]

## 2017-07-07 NOTE — TELEPHONE ENCOUNTER
Routing refill request to provider for review/approval because:  Pt not responding to messages.    Landen Aguillon RN, BSN

## 2017-07-08 NOTE — TELEPHONE ENCOUNTER
Previously filled by Endo.  Do you want to to continue or have PCP?  Routing refill request to provider for review/approval because:  Tia given x1 and patient did not follow up, please advisgokul Aguillon RN, BSN

## 2017-07-10 RX ORDER — PIOGLITAZONEHYDROCHLORIDE 45 MG/1
45 TABLET ORAL DAILY
Qty: 30 TABLET | Refills: 0 | Status: SHIPPED | OUTPATIENT
Start: 2017-07-10 | End: 2017-07-28

## 2017-07-10 RX ORDER — AMLODIPINE BESYLATE 5 MG/1
5 TABLET ORAL DAILY
Qty: 30 TABLET | Refills: 0 | Status: SHIPPED | OUTPATIENT
Start: 2017-07-10 | End: 2017-08-18

## 2017-07-10 RX ORDER — GLIPIZIDE 5 MG/1
10 TABLET, FILM COATED, EXTENDED RELEASE ORAL DAILY
Qty: 60 TABLET | Refills: 0 | Status: SHIPPED | OUTPATIENT
Start: 2017-07-10 | End: 2017-08-18 | Stop reason: DRUGHIGH

## 2017-07-10 RX ORDER — LISINOPRIL AND HYDROCHLOROTHIAZIDE 12.5; 2 MG/1; MG/1
TABLET ORAL
Qty: 60 TABLET | Refills: 0 | Status: SHIPPED | OUTPATIENT
Start: 2017-07-10 | End: 2017-08-18

## 2017-07-10 NOTE — TELEPHONE ENCOUNTER
Please call or send a letter that he is overdue for follow up and needs to schedule an appointment.  Sherri Mayer NP  Endocrinology

## 2017-07-11 RX ORDER — TRIAMCINOLONE ACETONIDE 1 MG/G
CREAM TOPICAL
Qty: 30 G | Refills: 0 | Status: SHIPPED | OUTPATIENT
Start: 2017-07-11 | End: 2020-06-26

## 2017-07-11 NOTE — TELEPHONE ENCOUNTER
2nd attempt. LVM to call clinic. Pt is due for DM visit and needs a referral for colonoscopy.  ROCK Negrete

## 2017-07-17 ENCOUNTER — TELEPHONE (OUTPATIENT)
Dept: FAMILY MEDICINE | Facility: CLINIC | Age: 62
End: 2017-07-17

## 2017-07-17 NOTE — TELEPHONE ENCOUNTER
Pt reports misplaced FIT kit, will stop by [facility] to  new kit in the next few days.   Abhinav Chery RN

## 2017-07-22 ENCOUNTER — HEALTH MAINTENANCE LETTER (OUTPATIENT)
Age: 62
End: 2017-07-22

## 2017-07-28 DIAGNOSIS — E11.21 TYPE 2 DIABETES MELLITUS WITH DIABETIC NEPHROPATHY, WITH LONG-TERM CURRENT USE OF INSULIN (H): ICD-10-CM

## 2017-07-28 DIAGNOSIS — I10 ESSENTIAL HYPERTENSION WITH GOAL BLOOD PRESSURE LESS THAN 140/90: ICD-10-CM

## 2017-07-28 DIAGNOSIS — E78.5 HYPERLIPIDEMIA LDL GOAL <70: ICD-10-CM

## 2017-07-28 DIAGNOSIS — E11.21 TYPE 2 DIABETES MELLITUS WITH DIABETIC NEPHROPATHY (H): ICD-10-CM

## 2017-07-28 DIAGNOSIS — Z79.4 TYPE 2 DIABETES MELLITUS WITH DIABETIC NEPHROPATHY, WITH LONG-TERM CURRENT USE OF INSULIN (H): ICD-10-CM

## 2017-07-31 ENCOUNTER — TELEPHONE (OUTPATIENT)
Dept: ENDOCRINOLOGY | Facility: CLINIC | Age: 62
End: 2017-07-31

## 2017-07-31 DIAGNOSIS — I10 HYPERTENSION GOAL BP (BLOOD PRESSURE) < 140/90: Primary | ICD-10-CM

## 2017-07-31 DIAGNOSIS — Z79.4 TYPE 2 DIABETES MELLITUS WITH DIABETIC NEPHROPATHY, WITH LONG-TERM CURRENT USE OF INSULIN (H): ICD-10-CM

## 2017-07-31 DIAGNOSIS — E11.21 TYPE 2 DIABETES MELLITUS WITH DIABETIC NEPHROPATHY, WITH LONG-TERM CURRENT USE OF INSULIN (H): ICD-10-CM

## 2017-07-31 RX ORDER — AMLODIPINE BESYLATE 5 MG/1
TABLET ORAL
Qty: 30 TABLET | Refills: 9 | Status: SHIPPED | OUTPATIENT
Start: 2017-07-31 | End: 2017-08-18

## 2017-07-31 RX ORDER — LIRAGLUTIDE 6 MG/ML
INJECTION SUBCUTANEOUS
Qty: 9 ML | Refills: 0 | Status: SHIPPED | OUTPATIENT
Start: 2017-07-31 | End: 2017-08-18

## 2017-07-31 RX ORDER — SIMVASTATIN 20 MG
TABLET ORAL
Qty: 90 TABLET | Refills: 1 | Status: SHIPPED | OUTPATIENT
Start: 2017-07-31 | End: 2017-08-18

## 2017-07-31 RX ORDER — PIOGLITAZONEHYDROCHLORIDE 45 MG/1
TABLET ORAL
Qty: 30 TABLET | Refills: 0 | Status: SHIPPED | OUTPATIENT
Start: 2017-07-31 | End: 2017-08-18

## 2017-07-31 RX ORDER — LISINOPRIL AND HYDROCHLOROTHIAZIDE 12.5; 2 MG/1; MG/1
TABLET ORAL
Qty: 60 TABLET | Refills: 0 | Status: SHIPPED | OUTPATIENT
Start: 2017-07-31 | End: 2017-08-18

## 2017-07-31 NOTE — TELEPHONE ENCOUNTER
simvastatin (ZOCOR) 20 MG tablet     Last Written Prescription Date: 2/15/2017  Last Fill Quantity: 90, # refills: 1  [place] [facility] [facility] or  Health prescribing provider: 5/23/2017  Next 5 appointments (look out 90 days)     Aug 11, 2017 11:30 AM CDT   Return Visit with LILIANA Tan CNP   [facility] ([facility]    [address]  [facility] [address]   [phone]                   Lab Results   Component Value Date    CHOL 99 01/13/2017     Lab Results   Component Value Date    HDL 43 01/13/2017     Lab Results   Component Value Date    LDL 28 01/13/2017     Lab Results   Component Value Date    TRIG 140 01/13/2017     Lab Results   Component Value Date    CHOLHDLRATIO 3.7 11/17/2015     lisinopril-hydrochlorothiazide (PRINZIDE/ZESTORETIC) 20-12.5 MG per tablet      Last Written Prescription Date: 7/10/2017  Last Fill Quantity: 60, # refills: 0  [place] [facility] [place] [facility] [facility] or  Health prescribing provider: 5/23/2017  Next 5 appointments (look out 90 days)     Aug 11, 2017 11:30 AM CDT   Return Visit with LILIANA Tan CNP   [facility] ([facility]    [address]  [facility] [address]   [phone]                   Potassium   Date Value Ref Range Status   05/06/2016 4.5 3.4 - 5.3 mmol/L Final     Creatinine   Date Value Ref Range Status   05/06/2016 0.95 0.66 - 1.25 mg/dL Final     BP Readings from Last 3 Encounters:   05/23/17 126/70   01/13/17 138/70   09/15/16 124/68     liraglutide (VICTOZA) 18 MG/3ML soln         Last Written Prescription Date: 9/15/2016  Last Fill Quantity: 27 mL, # refills: 1  Last [place] [facility] [place] [facility] [facility] or  Health prescribing provider:  5/23/2017   Next 5 appointments (look out 90 days)     Aug 11, 2017 11:30 AM CDT   Return Visit with LILIANA Tan CNP   [facility] ([facility]    [address]  [facility] [address]   [phone]                   BP Readings from Last 3 Encounters:   05/23/17 126/70   01/13/17 138/70   09/15/16 124/68     Lab Results   Component Value Date    MICROL 31 05/06/2016     Lab Results   Component Value Date    UMALCR 31.40 05/06/2016     Creatinine   Date Value Ref Range Status   05/06/2016 0.95 0.66 - 1.25 mg/dL Final   ]  GFR Estimate   Date Value Ref Range Status   05/06/2016 81 >60 mL/min/1.7m2 Final     Comment:     Non  GFR Calc   05/28/2015 66 >60 mL/min/1.7m2 Final     Comment:     Non  GFR Calc   06/17/2014 77 >60 mL/min/1.7m2 Final     GFR Estimate If Black   Date Value Ref Range Status   05/06/2016 >90   GFR Calc   >60 mL/min/1.7m2 Final   05/28/2015 80 >60 mL/min/1.7m2 Final     Comment:      GFR Calc   06/17/2014 >90 >60 mL/min/1.7m2 Final     Lab Results   Component Value Date    CHOL 99 01/13/2017     Lab Results   Component Value Date    HDL 43 01/13/2017     Lab Results   Component Value Date    LDL 28 01/13/2017     Lab Results   Component Value Date    TRIG 140 01/13/2017     Lab Results   Component Value Date    CHOLHDLRATIO 3.7 11/17/2015     Lab Results   Component Value Date    AST 20 05/06/2016     Lab Results   Component Value Date    ALT 36 05/06/2016     Lab Results   Component Value Date    A1C 8.4 01/13/2017    A1C 7.6 09/15/2016    A1C 6.5 05/06/2016    A1C 7.3 01/15/2016    A1C 8.3 12/18/2015     Potassium   Date Value Ref Range Status   05/06/2016 4.5 3.4 - 5.3 mmol/L Final     amLODIPine (NORVASC) 5 MG tablet      Last Written Prescription Date: 7/10/2017  Last Fill Quantity: 30, # refills: 0    Last Office Visit with [facility], [facility] or  Health prescribing provider:  5/23/2017   Future Office Visit:    Next 5 appointments (look out 90 days)     Aug 11, 2017 11:30 AM CDT   Return Visit with LILIANA Tan CNP   [facility] ([facility])     [address]  [place] [address]   [phone]                    BP Readings from Last 3 Encounters:   05/23/17 126/70   01/13/17 138/70   09/15/16 124/68     pioglitazone (ACTOS) 45 MG tablet         Last Written Prescription Date: 7/10/2017  Last Fill Quantity: 30, # refills: 0  Last Office Visit with [facility], [facility] or [facility] prescribing provider:  5/23/2017   Next 5 appointments (look out 90 days)     Aug 11, 2017 11:30 AM CDT   Return Visit with LILIANA Tan CNP   [facility] ([facility])    [address]  [facility] [address]   [phone]                   BP Readings from Last 3 Encounters:   05/23/17 126/70   01/13/17 138/70   09/15/16 124/68     Lab Results   Component Value Date    MICROL 31 05/06/2016     Lab Results   Component Value Date    UMALCR 31.40 05/06/2016     Creatinine   Date Value Ref Range Status   05/06/2016 0.95 0.66 - 1.25 mg/dL Final   ]  GFR Estimate   Date Value Ref Range Status   05/06/2016 81 >60 mL/min/1.7m2 Final     Comment:     Non  GFR Calc   05/28/2015 66 >60 mL/min/1.7m2 Final     Comment:     Non  GFR Calc   06/17/2014 77 >60 mL/min/1.7m2 Final     GFR Estimate If Black   Date Value Ref Range Status   05/06/2016 >90   GFR Calc   >60 mL/min/1.7m2 Final   05/28/2015 80 >60 mL/min/1.7m2 Final     Comment:      GFR Calc   06/17/2014 >90 >60 mL/min/1.7m2 Final     Lab Results   Component Value Date    CHOL 99 01/13/2017     Lab Results   Component Value Date    HDL 43 01/13/2017     Lab Results   Component Value Date    LDL 28 01/13/2017     Lab Results   Component Value Date    TRIG 140 01/13/2017     Lab Results   Component Value Date    CHOLHDLRATIO 3.7 11/17/2015     Lab Results   Component Value Date    AST 20 05/06/2016     Lab Results   Component Value Date    ALT 36 05/06/2016     Lab Results   Component Value Date    A1C 8.4 01/13/2017     A1C 7.6 09/15/2016    A1C 6.5 05/06/2016    A1C 7.3 01/15/2016    A1C 8.3 12/18/2015     Potassium   Date Value Ref Range Status   05/06/2016 4.5 3.4 - 5.3 mmol/L Final

## 2017-07-31 NOTE — TELEPHONE ENCOUNTER
Sherri-see below.  I T'd up A1c, BMP, Microalb and TSH.  What else would you like?  Rosio Nieves RN

## 2017-07-31 NOTE — TELEPHONE ENCOUNTER
Prescription approved per [facility] Refill Protocol.  Kaela Parisi, RN - BC

## 2017-07-31 NOTE — TELEPHONE ENCOUNTER
Wants to do Labs on 8/5 in anticipation of 8/11 appt - at first it seemed like patient might try to cancel appt with Sherri but I told him he needs appt in addition to the labs

## 2017-08-01 ENCOUNTER — DOCUMENTATION ONLY (OUTPATIENT)
Dept: LAB | Facility: CLINIC | Age: 62
End: 2017-08-01

## 2017-08-01 DIAGNOSIS — Z12.5 SPECIAL SCREENING FOR MALIGNANT NEOPLASM OF PROSTATE: Primary | ICD-10-CM

## 2017-08-01 NOTE — TELEPHONE ENCOUNTER
Lab orders signed, let Ray know he can schedule a lab appointment and I'll see him at his follow up visit.  Sherri Mayer NP  Endocrinology

## 2017-08-01 NOTE — PROGRESS NOTES
Please review, associate diagnosis and sign pending laboratory orders for patient upcoming appointment on 08/05/2017. Thank You.

## 2017-08-05 DIAGNOSIS — E11.21 TYPE 2 DIABETES MELLITUS WITH DIABETIC NEPHROPATHY, WITH LONG-TERM CURRENT USE OF INSULIN (H): ICD-10-CM

## 2017-08-05 DIAGNOSIS — Z12.11 ENCOUNTER FOR SCREENING COLONOSCOPY: ICD-10-CM

## 2017-08-05 DIAGNOSIS — I10 HYPERTENSION GOAL BP (BLOOD PRESSURE) < 140/90: ICD-10-CM

## 2017-08-05 DIAGNOSIS — Z12.5 SPECIAL SCREENING FOR MALIGNANT NEOPLASM OF PROSTATE: ICD-10-CM

## 2017-08-05 DIAGNOSIS — Z79.4 TYPE 2 DIABETES MELLITUS WITH DIABETIC NEPHROPATHY, WITH LONG-TERM CURRENT USE OF INSULIN (H): ICD-10-CM

## 2017-08-05 LAB
ALBUMIN SERPL-MCNC: 3.5 G/DL (ref 3.4–5)
ALP SERPL-CCNC: 70 U/L (ref 40–150)
ALT SERPL W P-5'-P-CCNC: 37 U/L (ref 0–70)
ANION GAP SERPL CALCULATED.3IONS-SCNC: 7 MMOL/L (ref 3–14)
AST SERPL W P-5'-P-CCNC: 14 U/L (ref 0–45)
BILIRUB SERPL-MCNC: 0.3 MG/DL (ref 0.2–1.3)
BUN SERPL-MCNC: 31 MG/DL (ref 7–30)
CALCIUM SERPL-MCNC: 9.2 MG/DL (ref 8.5–10.1)
CHLORIDE SERPL-SCNC: 106 MMOL/L (ref 94–109)
CO2 SERPL-SCNC: 23 MMOL/L (ref 20–32)
CREAT SERPL-MCNC: 0.9 MG/DL (ref 0.66–1.25)
CREAT UR-MCNC: 104 MG/DL
GFR SERPL CREATININE-BSD FRML MDRD: 86 ML/MIN/1.7M2
GLUCOSE SERPL-MCNC: 190 MG/DL (ref 70–99)
HBA1C MFR BLD: 8.2 % (ref 4.3–6)
MICROALBUMIN UR-MCNC: 59 MG/L
MICROALBUMIN/CREAT UR: 56.73 MG/G CR (ref 0–17)
POTASSIUM SERPL-SCNC: 4.9 MMOL/L (ref 3.4–5.3)
PROT SERPL-MCNC: 7 G/DL (ref 6.8–8.8)
PSA SERPL-ACNC: 0.42 UG/L (ref 0–4)
SODIUM SERPL-SCNC: 136 MMOL/L (ref 133–144)
TSH SERPL DL<=0.005 MIU/L-ACNC: 1.56 MU/L (ref 0.4–4)

## 2017-08-05 PROCEDURE — 83036 HEMOGLOBIN GLYCOSYLATED A1C: CPT | Performed by: CLINICAL NURSE SPECIALIST

## 2017-08-05 PROCEDURE — 82043 UR ALBUMIN QUANTITATIVE: CPT | Performed by: CLINICAL NURSE SPECIALIST

## 2017-08-05 PROCEDURE — 36415 COLL VENOUS BLD VENIPUNCTURE: CPT | Performed by: CLINICAL NURSE SPECIALIST

## 2017-08-05 PROCEDURE — 84443 ASSAY THYROID STIM HORMONE: CPT | Performed by: CLINICAL NURSE SPECIALIST

## 2017-08-05 PROCEDURE — G0103 PSA SCREENING: HCPCS | Performed by: CLINICAL NURSE SPECIALIST

## 2017-08-05 PROCEDURE — 80053 COMPREHEN METABOLIC PANEL: CPT | Performed by: CLINICAL NURSE SPECIALIST

## 2017-08-06 NOTE — PROGRESS NOTES
Ray,  Here's a copy of your recent lab results for your records.  We'll discuss your labs in detail you your upcoming appointment on 8/11.  Sherri Mayer NP  Endocrinology

## 2017-08-18 ENCOUNTER — OFFICE VISIT (OUTPATIENT)
Dept: ENDOCRINOLOGY | Facility: CLINIC | Age: 62
End: 2017-08-18
Payer: COMMERCIAL

## 2017-08-18 VITALS
WEIGHT: 276.7 LBS | SYSTOLIC BLOOD PRESSURE: 137 MMHG | DIASTOLIC BLOOD PRESSURE: 84 MMHG | BODY MASS INDEX: 36.51 KG/M2 | HEART RATE: 98 BPM | RESPIRATION RATE: 20 BRPM | TEMPERATURE: 98.1 F | OXYGEN SATURATION: 96 %

## 2017-08-18 DIAGNOSIS — E11.65 TYPE 2 DIABETES MELLITUS WITH HYPERGLYCEMIA, WITHOUT LONG-TERM CURRENT USE OF INSULIN (H): Primary | ICD-10-CM

## 2017-08-18 DIAGNOSIS — I10 ESSENTIAL HYPERTENSION WITH GOAL BLOOD PRESSURE LESS THAN 140/90: ICD-10-CM

## 2017-08-18 DIAGNOSIS — E78.5 HYPERLIPIDEMIA LDL GOAL <70: ICD-10-CM

## 2017-08-18 PROCEDURE — 99214 OFFICE O/P EST MOD 30 MIN: CPT | Performed by: CLINICAL NURSE SPECIALIST

## 2017-08-18 RX ORDER — LISINOPRIL AND HYDROCHLOROTHIAZIDE 12.5; 2 MG/1; MG/1
TABLET ORAL
Qty: 180 TABLET | Refills: 1 | Status: SHIPPED | OUTPATIENT
Start: 2017-08-18 | End: 2018-05-04

## 2017-08-18 RX ORDER — DOXYCYCLINE 100 MG/1
100 CAPSULE ORAL 2 TIMES DAILY
Qty: 180 CAPSULE | Refills: 3 | Status: CANCELLED | OUTPATIENT
Start: 2017-08-18

## 2017-08-18 RX ORDER — TRIAMCINOLONE ACETONIDE 1 MG/G
CREAM TOPICAL
Qty: 30 G | Refills: 0 | Status: CANCELLED | OUTPATIENT
Start: 2017-08-18

## 2017-08-18 RX ORDER — AMLODIPINE BESYLATE 5 MG/1
5 TABLET ORAL DAILY
Qty: 90 TABLET | Refills: 1 | Status: SHIPPED | OUTPATIENT
Start: 2017-08-18 | End: 2020-06-26

## 2017-08-18 RX ORDER — LANCETS
EACH MISCELLANEOUS
Qty: 100 EACH | Refills: 3 | Status: SHIPPED | OUTPATIENT
Start: 2017-08-18 | End: 2018-05-04

## 2017-08-18 RX ORDER — GLIPIZIDE 10 MG/1
20 TABLET, FILM COATED, EXTENDED RELEASE ORAL DAILY
Qty: 180 TABLET | Refills: 1 | Status: SHIPPED | OUTPATIENT
Start: 2017-08-18 | End: 2018-02-26

## 2017-08-18 RX ORDER — PIOGLITAZONEHYDROCHLORIDE 45 MG/1
45 TABLET ORAL DAILY
Qty: 90 TABLET | Refills: 1 | Status: SHIPPED | OUTPATIENT
Start: 2017-08-18 | End: 2018-03-15

## 2017-08-18 RX ORDER — METRONIDAZOLE 10 MG/G
GEL TOPICAL DAILY
Qty: 60 G | Refills: 11 | Status: CANCELLED | OUTPATIENT
Start: 2017-08-18

## 2017-08-18 RX ORDER — SIMVASTATIN 20 MG
TABLET ORAL
Qty: 90 TABLET | Refills: 1 | Status: SHIPPED | OUTPATIENT
Start: 2017-08-18 | End: 2020-07-14

## 2017-08-18 RX ORDER — GLIPIZIDE 5 MG/1
10 TABLET, FILM COATED, EXTENDED RELEASE ORAL DAILY
Qty: 60 TABLET | Refills: 0 | Status: CANCELLED | OUTPATIENT
Start: 2017-08-18

## 2017-08-18 RX ORDER — LIRAGLUTIDE 6 MG/ML
INJECTION SUBCUTANEOUS
Qty: 27 ML | Refills: 1 | Status: SHIPPED | OUTPATIENT
Start: 2017-08-18 | End: 2018-05-04

## 2017-08-18 NOTE — MR AVS SNAPSHOT
After Visit Summary   8/18/2017    Francois Fontana    MRN: [id number]           Patient Information     Date Of Birth          1955        Visit Information        Provider Department      8/18/2017 2:30 PM Sherri Mayer APRN [facility]        Today's Diagnoses     Type 2 diabetes mellitus with hyperglycemia, without long-term current use of insulin (H)    -  1    Hyperlipidemia LDL goal <70        Essential hypertension with goal blood pressure less than 140/90          Care Instructions      Your A1c improved a little to 8.2%  Component      Latest Ref Rng & Units 5/6/2016 9/15/2016 1/13/2017 8/5/2017   Hemoglobin A1C      4.3 - 6.0 % 6.5 (H) 7.6 (H) 8.4 (H) 8.2 (H)       Increase glipizide to 10 mg twice daily before breakfast or lunch and one before dinner.    Let me know if you have low blood sugars.     Follow up with me in 3 months.    Sherri Mayer NP  Endocrinology            Follow-ups after your visit        Follow-up notes from your care team     Return in about 3 months (around 11/18/2017).      Who to contact     If you have questions or need follow up information about today's clinic visit or your schedule please contact [facility] directly at [phone].  Normal or non-critical lab and imaging results will be communicated to you by HeySpacehart, letter or phone within 4 business days after the clinic has received the results. If you do not hear from us within 7 days, please contact the clinic through HeySpacehart or phone. If you have a critical or abnormal lab result, we will notify you by phone as soon as possible.  Submit refill requests through Calpano or call your pharmacy and they will forward the refill request to us. Please allow 3 business days for your refill to be completed.          Additional Information About Your Visit        HeySpaceharLoladex Information     Calpano gives you secure access to your electronic health record. If you see a  primary care provider, you can also send messages to your care team and make appointments. If you have questions, please call your primary care clinic.  If you do not have a primary care provider, please call [phone] and they will assist you.        Care EveryWhere ID     This is your Care EveryWhere ID. This could be used by other organizations to access your [place] medical records  [id number]        Your Vitals Were     Pulse Temperature Respirations Pulse Oximetry BMI (Body Mass Index)       98 98.1  F (36.7  C) (Oral) 20 96% 36.51 kg/m2        Blood Pressure from Last 3 Encounters:   08/18/17 137/84   05/23/17 126/70   01/13/17 138/70    Weight from Last 3 Encounters:   08/18/17 125.5 kg (276 lb 11.2 oz)   05/23/17 124.7 kg (275 lb)   01/13/17 121.6 kg (268 lb)              Today, you had the following     No orders found for display         Today's Medication Changes          These changes are accurate as of: 8/18/17 11:59 PM.  If you have any questions, ask your nurse or doctor.               These medicines have changed or have updated prescriptions.        Dose/Directions    amLODIPine 5 MG tablet   Commonly known as:  NORVASC   This may have changed:  See the new instructions.   Used for:  Hyperlipidemia LDL goal <70, Essential hypertension with goal blood pressure less than 140/90   Changed by:  Sherri Mayer APRN CNP        Dose:  5 mg   Take 1 tablet (5 mg) by mouth daily   Quantity:  90 tablet   Refills:  1       glipiZIDE 10 MG 24 hr tablet   Commonly known as:  glipiZIDE XL   This may have changed:    - medication strength  - how much to take   Used for:  Hyperlipidemia LDL goal <70, Essential hypertension with goal blood pressure less than 140/90   Changed by:  Sherri Mayer APRN CNP        Dose:  20 mg   Take 2 tablets (20 mg) by mouth daily   Quantity:  180 tablet   Refills:  1       liraglutide 18 MG/3ML soln   Commonly known as:  VICTOZA PEN   This may have changed:  See the  new instructions.   Used for:  Hyperlipidemia LDL goal <70, Essential hypertension with goal blood pressure less than 140/90   Changed by:  Sherri Mayer APRN CNP        INJECT 1.8MG SUBCUTANEOUSLY DAILY   Quantity:  27 mL   Refills:  1       lisinopril-hydrochlorothiazide 20-12.5 MG per tablet   Commonly known as:  PRINZIDE/ZESTORETIC   This may have changed:  See the new instructions.   Used for:  Hyperlipidemia LDL goal <70, Essential hypertension with goal blood pressure less than 140/90   Changed by:  Sherri Mayer APRN CNP        TAKE TWO TABLETS BY MOUTH ONCE DAILY   Quantity:  180 tablet   Refills:  1       metroNIDAZOLE 1 % gel   Commonly known as:  METROGEL   This may have changed:  Another medication with the same name was removed. Continue taking this medication, and follow the directions you see here.   Used for:  Rosacea   Changed by:  Denia Pope NP        Apply topically daily   Quantity:  60 g   Refills:  11       pioglitazone 45 MG tablet   Commonly known as:  ACTOS   This may have changed:  See the new instructions.   Used for:  Hyperlipidemia LDL goal <70, Essential hypertension with goal blood pressure less than 140/90   Changed by:  Sherri Mayer APRN CNP        Dose:  45 mg   Take 1 tablet (45 mg) by mouth daily   Quantity:  90 tablet   Refills:  1       simvastatin 20 MG tablet   Commonly known as:  ZOCOR   This may have changed:  See the new instructions.   Used for:  Hyperlipidemia LDL goal <70   Changed by:  Sherri Mayer APRN CNP        TAKE ONE TABLET BY MOUTH AT BEDTIME   Quantity:  90 tablet   Refills:  1       triamcinolone 0.1 % cream   Commonly known as:  KENALOG   This may have changed:  Another medication with the same name was removed. Continue taking this medication, and follow the directions you see here.   Used for:  Rash   Changed by:  Denia Pope NP        APPLY SPARINGLY TO AFFECTED AREA THREE TIMES DAILY FOR 14 DAYS   Quantity:  30 g    Refills:  0            Where to get your medicines      These medications were sent to [id number] - [place], [place] - [address] [facility]  [address] [facility] [address]     Phone:  [phone]     amLODIPine 5 MG tablet    blood glucose monitoring lancets    blood glucose monitoring test strip    glipiZIDE 10 MG 24 hr tablet    insulin pen needle 31G X 5 MM    liraglutide 18 MG/3ML soln    lisinopril-hydrochlorothiazide 20-12.5 MG per tablet    metFORMIN 1000 MG tablet    pioglitazone 45 MG tablet    simvastatin 20 MG tablet         Some of these will need a paper prescription and others can be bought over the counter.  Ask your nurse if you have questions.     You don't need a prescription for these medications     ASPIRIN NOT PRESCRIBED                Primary Care Provider Office Phone # Fax #    Denia Pope [phone]       [facility] [address] [address] [address]        Equal Access to Services     ZACHARIAH THOMAS : Hadii aad ku hadasho Soomaali, waaxda luqadaha, qaybta kaalmada adeegyada, waxay idiin hayaan matthew kharash brad . So [facility] [phone].    ATENCIÓN: Si habla español, tiene a dunham disposición servicios gratuitos de asistencia lingüística. Herminia al [phone].    We comply with applicable federal civil rights laws and [place] laws. We do not discriminate on the basis of race, color, national origin, age, disability sex, sexual orientation or gender identity.            Thank you!     Thank you for choosing [facility]  for your care. Our goal is always to provide you with excellent care. Hearing back from our patients is one way we can continue to improve our services. Please take a few minutes to complete the written survey that you may receive in the mail after your visit with us. Thank you!             Your Updated Medication List - Protect others around you: Learn how to safely use, store and throw away  your medicines at www.disposemymeds.org.          This list is accurate as of: 8/18/17 11:59 PM.  Always use your most recent med list.                   Brand Name Dispense Instructions for use Diagnosis    amLODIPine 5 MG tablet    NORVASC    90 tablet    Take 1 tablet (5 mg) by mouth daily    Hyperlipidemia LDL goal <70, Essential hypertension with goal blood pressure less than 140/90       ASPIRIN NOT PRESCRIBED    INTENTIONAL     1 each continuous prn for other Antiplatelet medication not prescribed intentionally due to bruising excessively    Hyperlipidemia LDL goal <70, Essential hypertension with goal blood pressure less than 140/90, Type 2 diabetes mellitus with hyperglycemia, without long-term current use of insulin (H)       blood glucose monitoring lancets     100 each    Use to test blood sugar 3 times daily or as directed.    Hyperlipidemia LDL goal <70, Essential hypertension with goal blood pressure less than 140/90, Type 2 diabetes mellitus with hyperglycemia, without long-term current use of insulin (H)       blood glucose monitoring test strip    ACCU-CHEK SMARTVIEW    300 each    Use to test blood sugar 3 times daily or as directed.    Hyperlipidemia LDL goal <70, Essential hypertension with goal blood pressure less than 140/90       doxycycline 100 MG capsule    VIBRAMYCIN    180 capsule    Take 1 capsule (100 mg) by mouth 2 times daily    Rosacea       glipiZIDE 10 MG 24 hr tablet    glipiZIDE XL    180 tablet    Take 2 tablets (20 mg) by mouth daily    Hyperlipidemia LDL goal <70, Essential hypertension with goal blood pressure less than 140/90       insulin pen needle 31G X 5 MM    B-D U/F    100 each    Use 1 daily or as directed.    Hyperlipidemia LDL goal <70, Essential hypertension with goal blood pressure less than 140/90       liraglutide 18 MG/3ML soln    VICTOZA PEN    27 mL    INJECT 1.8MG SUBCUTANEOUSLY DAILY    Hyperlipidemia LDL goal <70, Essential hypertension with goal blood  pressure less than 140/90       lisinopril-hydrochlorothiazide 20-12.5 MG per tablet    PRINZIDE/ZESTORETIC    180 tablet    TAKE TWO TABLETS BY MOUTH ONCE DAILY    Hyperlipidemia LDL goal <70, Essential hypertension with goal blood pressure less than 140/90       metFORMIN 1000 MG tablet    GLUCOPHAGE    360 tablet    Take 1 tablet (1,000 mg) by mouth 2 times daily (with meals)    Hyperlipidemia LDL goal <70, Essential hypertension with goal blood pressure less than 140/90       metroNIDAZOLE 1 % gel    METROGEL    60 g    Apply topically daily    Rosacea       order for DME      Patient Francois Fontana was set up at [facility] on September 23, 2015. Patient received a [employer] DreamStation Auto CPAP Auto. Pressures were set at Auto 6 - 12 cm H2O.   Patient?s ramp is 5 cm H2O for 30 min and FLEX/EPR is A Flex.  Patient received a [employer] Mask name: Wisp  Nasal mask Size Small, Medium, Large, heated tubing and heated humidifier.  Patient is enrolled in the [facility] Program and does need to meet compliance. Patient has a follow up on 11/4/15 with Bennett Goltz, PA.   Myra Lim (entered by Nitin Wilson)        pioglitazone 45 MG tablet    ACTOS    90 tablet    Take 1 tablet (45 mg) by mouth daily    Hyperlipidemia LDL goal <70, Essential hypertension with goal blood pressure less than 140/90       simvastatin 20 MG tablet    ZOCOR    90 tablet    TAKE ONE TABLET BY MOUTH AT BEDTIME    Hyperlipidemia LDL goal <70       triamcinolone 0.1 % cream    KENALOG    30 g    APPLY SPARINGLY TO AFFECTED AREA THREE TIMES DAILY FOR 14 DAYS    Rash

## 2017-08-18 NOTE — NURSING NOTE
"Chief Complaint   Patient presents with     Diabetes       Initial /84  Pulse 98  Temp 98.1  F (36.7  C) (Oral)  Resp 20  Wt 276 lb 11.2 oz (125.5 kg)  SpO2 96%  BMI 36.51 kg/m2 Estimated body mass index is 36.51 kg/(m^2) as calculated from the following:    Height as of 1/13/17: 6' 1\" (1.854 m).    Weight as of this encounter: 276 lb 11.2 oz (125.5 kg).  Medication Reconciliation: complete   Alexandra Day CMA (AAMA)      "

## 2017-08-18 NOTE — PROGRESS NOTES
Name: Francois Fontana  Seen at the request of Calixto Merino for Diabetes (Last seen 2017).  HPI:  Francois Fontana is a 62 year old male who presents for the evaluation/management of    1. Type 2 DM:  Originally diagnosed in .  Was not symptomatic at the time, was diagnosed during routine labs.  Initially treated with glyburide, metformin was later added.  Actos was last added 5 years ago.    Current Regimen: Metformin 1000 mg bid, glipizide XL 5 mg bid,  Actos 45 mg qd, and Victoza 1.8 mg once daily.    BS checks: Not testing  Average Meter Download: didn't bring meter    Exercising daily - 30+ minutes.  Eating more bread again.    Complications:   Diabetes Complications  Description / Detail    Diabetic Retinopathy  No, last exam < 1 year ago   CAD / PAD  No   Neuropathy  No   Nephropathy / Microalbuminuria  Yes: elevated urine microalbumin noted for the first time 2015   Gastroparesis  No   Hypoglycemia Unawareness  No     2. Hypertension: Blood Pressure today:   BP Readings from Last 3 Encounters:   17 137/84   17 126/70   17 138/70   .  Blood pressure medications include Lisinopril/HCTZ 40/25 mg and Amlodipine 5 mg qd.  Takes medications everyday without forgetting a dose.  Denies feeling lightheaded or dizzy.   3. Hyperlipidemia: Takes Simvastatin 20 mg qd for lipid control.  Denies muscle aches of pains.       4. Prevention:  Flu Shot- 2016  Pneumovax- 2006  Opthalmology-Yes: 2016  Dental-  ASA-No, excessive bruising from ASA  Smoking- No  PMH/PSH:  Past Medical History:   Diagnosis Date     Rosacea      Type II or unspecified type diabetes mellitus without mention of complication, not stated as uncontrolled     Abstracted 02     Unspecified essential hypertension      Past Surgical History:   Procedure Laterality Date     HERNIA REPAIR, INGUINAL RT/LT      right     Family Hx:  Family History   Problem Relation Age of Onset     CANCER Father        of lung cancer     C.A.D. Father      Mult bypass operations     Type 2 Diabetes Father      Prostate Cancer Father      Respiratory Mother       of COPD     GASTROINTESTINAL DISEASE Brother      Hemochromatosis     GASTROINTESTINAL DISEASE Brother      Hemochromatosis     GASTROINTESTINAL DISEASE Brother      Hemochromatosis ( in 3 bros)     Thyroid disease:         DM2:         Autoimmune: DM1, SLE, RA, Vitiligo     Social Hx:  Social History     Social History     Marital status:      Spouse name: N/A     Number of children: N/A     Years of education: N/A     Occupational History     Not on file.     Social History Main Topics     Smoking status: Former Smoker     Quit date: 1985     Smokeless tobacco: Never Used      Comment: smoked socially     Alcohol use No      Comment: quit drinking age 30     Drug use: No     Sexual activity: Yes     Partners: Female     Other Topics Concern     Parent/Sibling W/ Cabg, Mi Or Angioplasty Before 65f 55m? Yes     Social History Narrative          MEDICATIONS:  has a current medication list which includes the following prescription(s): pioglitazone, amlodipine, victoza pen, lisinopril-hydrochlorothiazide, simvastatin, triamcinolone, glipizide, doxycycline, metronidazole, insulin pen needle, blood glucose monitoring, metformin, blood glucose monitoring, order for dme, and aspirin not prescribed.    ROS     ROS: 10 point ROS neg other than the symptoms noted above in the HPI.    Physical Exam   VS: /84  Pulse 98  Temp 98.1  F (36.7  C) (Oral)  Resp 20  Wt 125.5 kg (276 lb 11.2 oz)  SpO2 96%  BMI 36.51 kg/m2  GENERAL: NAD, well dressed, answering questions appropriately, appears stated age.  HEENT: OP clear, no exopthalmous, no proptosis, EOMI, no lig lag, no retraction, no scleral icterus  RESPIRATORY: Normal respiratory effort.  CARDIOVASCULAR: No peripheral edema.  EXTREMITIES: no edema, +pulses, no rashes, no lesions  NEUROLOGY: CN grossly intact,  no tremors  MSK: grossly intact, No digital cyanosis. Normal gait and station.  SKIN: no rashes, no lesions, no ulcers  PSYCH: Intact judgment and insight. A&OX3 with a cordial affect.    LABS:  A1c:   Component      Latest Ref Rng & Units 5/6/2016 9/15/2016 1/13/2017 8/5/2017   Hemoglobin A1C      4.3 - 6.0 % 6.5 (H) 7.6 (H) 8.4 (H) 8.2 (H)     CMP:  !COMPREHENSIVE Latest Ref Rng 5/28/2015 5/6/2016   SODIUM 133 - 144 mmol/L 137 138   POTASSIUM 3.4 - 5.3 mmol/L 4.2 4.5   CHLORIDE 94 - 109 mmol/L 105 107   BUN 7 - 30 mg/dL 23 22   Creatinine 0.66 - 1.25 mg/dL 1.14 0.95   Glucose 70 - 99 mg/dL 164 (H) 120 (H)   ANION GAP 3 - 14 mmol/L 10 7   CALCIUM 8.5 - 10.1 mg/dL 8.8 8.8     LFTS/Cholesterol Panel:  !LIPID/HEPATIC Latest Ref Rng 12/19/2013 11/17/2015   CHOLESTEROL <200 mg/dL 154 149   TRIGLYCERIDES 0 - 150 mg/dL 221 (H) 196 (H)   HDL CHOLESTEROL >40 mg/dL 39 (L) 40 (L)   LDL CHOLESTEROL, CALCULATED 0 - 129 mg/dL 71 70   VLDL-CHOLESTEROL 0 - 30 mg/dL 44 (H) Pending   CHOLESTEROL/HDL RATIO 0.0 - 5.0 4.0 Pending   AST 0 - 55 U/L     ALT 0 - 70 U/L       Thyroid Function:   !THYROID Latest Ref Rng 5/6/2016 5/28/2015 6/12/2013 11/4/2010   TSH 0.40 - 4.00 mU/L 1.59 0.94 1.60 1.12     Urine MicroAlbumin:  Component    Latest Ref Rng 12/19/2013 5/28/2015 5/6/2016   Creatinine Urine     128 136 100   Microalbumin Urine     19 72 31   Microalbumin mg/g Cr    0 - 17 mg/g Cr 14.84 52.72 (H) 31.40 (H)     Vitamin D:    All pertinent notes, labs, and images personally reviewed by me.     A/P  Mr.Raymond COLETTE Fontana is a 62 year old here for the evaluation/management of diabetes:    1. DM2 - Unontrolled, today's A1c improved slightly 8.4--> 8.2%..   He's a  so he wants to avoid insulin if possible.  Increase glipizide to 10 mg bid.  There is some variability among people, most will usually develop symptoms suggestive of hypoglycemia when blood glucose levels are lowered to the mid 60's. The first set of symptoms are  called adrenergic. Patients may experience any of the following nervousness, sweating, intense hunger, trembling, weakness, palpitations, and difficulty speaking.   The acute management of hypoglycemia involves the rapid delivery of a source of easily absorbed sugar. Regular soda, juice, lifesavers, table sugar, are good options. 15 grams of glucose is the dose that is given, followed by an assessment of symptoms and a blood glucose check if possible. If after 10 minutes there is no improvement, another 10-15 grams should be given. This can be repeated up to three times. The equivalency of 10-15 grams of glucose (approximate servings) are: 3-5 hard candies, 3 teaspoons of sugar, or 1/2 cup of regular soda or juice.    4. Prevention:  Flu Shot- annually  Pneumovax- 11/2/2006  Opthalmology-Yes: 11/17/2015  Dental-  ASA-No, excessive bruising from ASA  Smoking- No  2. Hypertension - Controlled.  Currently treated with Lisinopril/HCTZ 40/25 mg and Amlodipine 5 mg qd.  Continue current medications.    3. Hyperlipidemia - Currently treated with Simvastatin 20 mg qd.    Labs ordered today:   No orders of the defined types were placed in this encounter.      Radiology/Consults ordered today: None    All questions were answered.  The patient indicates understanding of the above issues and agrees with the plan set forth.  Total face to face time equal to or greater than 25 minutes.       Follow-up:  follow up in 3 month(s)    Sherri Mayer NP  Endocrinology  [facility]  CC: Calixto Merino

## 2017-08-18 NOTE — PATIENT INSTRUCTIONS
Your A1c improved a little to 8.2%  Component      Latest Ref Rng & Units 5/6/2016 9/15/2016 1/13/2017 8/5/2017   Hemoglobin A1C      4.3 - 6.0 % 6.5 (H) 7.6 (H) 8.4 (H) 8.2 (H)       Increase glipizide to 10 mg twice daily before breakfast or lunch and one before dinner.    Let me know if you have low blood sugars.     Follow up with me in 3 months.    Sherri Mayer NP  Endocrinology

## 2017-08-19 PROCEDURE — 82274 ASSAY TEST FOR BLOOD FECAL: CPT | Performed by: FAMILY MEDICINE

## 2017-08-30 DIAGNOSIS — Z12.5 SPECIAL SCREENING FOR MALIGNANT NEOPLASM OF PROSTATE: ICD-10-CM

## 2017-08-30 DIAGNOSIS — Z12.11 ENCOUNTER FOR SCREENING COLONOSCOPY: ICD-10-CM

## 2017-08-30 LAB — HEMOCCULT STL QL IA: NEGATIVE

## 2018-01-30 ENCOUNTER — TELEPHONE (OUTPATIENT)
Dept: ENDOCRINOLOGY | Facility: CLINIC | Age: 63
End: 2018-01-30

## 2018-01-30 NOTE — TELEPHONE ENCOUNTER
I haven't seen him since 8/2017 so maybe it's better if he schedules a follow up.  If he knows what alternative are covered, he can bring the list with him (or maybe he can check with his insurance to see what the alternatives to Victoza are - would be Bydureon, or Trulicity, or Byetta).  Sherri Mayer NP  Endocrinology

## 2018-01-30 NOTE — TELEPHONE ENCOUNTER
Received fax from [facility], [facility]  Requesting PA on Victoza    Do you want PA submitted?    Yue Duckworth RN, BS  Clinical Nurse Triage.

## 2018-01-31 NOTE — TELEPHONE ENCOUNTER
Spoke with patient.  He states he is transitioning to [facility].  He will pursue his medication through them but states that if things don't go well, he will definitely be back to see us.  Aneta Breen M.A.

## 2018-02-26 DIAGNOSIS — I10 ESSENTIAL HYPERTENSION WITH GOAL BLOOD PRESSURE LESS THAN 140/90: ICD-10-CM

## 2018-02-26 DIAGNOSIS — E78.5 HYPERLIPIDEMIA LDL GOAL <70: ICD-10-CM

## 2018-02-27 NOTE — TELEPHONE ENCOUNTER
"Requested Prescriptions   Pending Prescriptions Disp Refills     glipiZIDE (GLUCOTROL XL) 10 MG 24 hr tablet [Pharmacy Med Name: GLIPIZIDE ER 10MG   TAB]  Last Written Prescription Date:  8/18/2017  Last Fill Quantity: 180 tablet,  # refills: 1   Last office visit: 8/18/2017 with prescribing provider:  Len    180 tablet 1     Sig: TAKE TWO TABLETS BY MOUTH ONCE DAILY    Sulfonylurea Agents Failed    2/26/2018  2:59 PM       Failed - Patient has documented LDL within the past 12 mos.    Recent Labs   Lab Test  01/13/17   1057   LDL  28          Failed - Patient has documented A1c within the specified period of time.    Recent Labs   Lab Test  08/05/17   0845   A1C  8.2*          Failed - Patient has had an appointment with authorizing provider within the past 6 mos. or  within next 30 days    Patient had office visit in the last 6 months or has a visit in the next 30 days with authorizing provider.  See \"Patient Info\" tab in inbasket, or \"Choose Columns\" in Meds & Orders section of the refill encounter.           Passed - Blood pressure less than 140/90 in past 6 months    BP Readings from Last 3 Encounters:   08/18/17 137/84   05/23/17 126/70   01/13/17 138/70          Passed - Patient has had a Microalbumin in the past 12 mos.    Recent Labs   Lab Test  08/05/17   0845   MICROL  59   UMALCR  56.73*          Passed - Patient is age 18 or older       Passed - Patient has a recent creatinine (normal) within the past 12 mos.    Recent Labs   Lab Test  08/05/17   0845   CR  0.90             "

## 2018-03-01 NOTE — TELEPHONE ENCOUNTER
Routing refill request to provider for review/approval because:  Labs out of range:  A1c  Labs not current:  A1c, lipids  Attempted to call pt, no answer, unable to leave message    Yue Duckworth RN, BS  Clinical Nurse Triage.

## 2018-03-02 RX ORDER — GLIPIZIDE 10 MG/1
20 TABLET, FILM COATED, EXTENDED RELEASE ORAL DAILY
Qty: 60 TABLET | Refills: 1 | Status: SHIPPED | OUTPATIENT
Start: 2018-03-02 | End: 2020-07-14

## 2018-03-15 DIAGNOSIS — I10 ESSENTIAL HYPERTENSION WITH GOAL BLOOD PRESSURE LESS THAN 140/90: ICD-10-CM

## 2018-03-15 DIAGNOSIS — E78.5 HYPERLIPIDEMIA LDL GOAL <70: ICD-10-CM

## 2018-03-15 RX ORDER — PIOGLITAZONEHYDROCHLORIDE 45 MG/1
TABLET ORAL
Qty: 30 TABLET | Refills: 0 | Status: SHIPPED | OUTPATIENT
Start: 2018-03-15 | End: 2018-06-06

## 2018-03-15 NOTE — TELEPHONE ENCOUNTER
"Requested Prescriptions   Pending Prescriptions Disp Refills     pioglitazone (ACTOS) 45 MG tablet [Pharmacy Med Name: PIOGLITAZONE 45MG TAB]      Last Written Prescription Date:  8/18/17  Last Fill Quantity: 90 tablet,  # refills: 1   Last office visit: 8/18/2017 with prescribing provider:  Moreno   Future Office Visit:     90 tablet 1     Sig: TAKE ONE TABLET BY MOUTH ONCE DAILY    Thiazolidinedione Agents (TZDs)  Failed    3/15/2018 11:21 AM       Failed - Blood pressure less than 140/90 in past 6 months    BP Readings from Last 3 Encounters:   08/18/17 137/84   05/23/17 126/70   01/13/17 138/70                Failed - Patient has documented LDL within the past 12 mos.    Recent Labs   Lab Test  01/13/17   1057   LDL  28            Failed - Patient has documented A1c within the specified period of time.    Recent Labs   Lab Test  08/05/17   0845   A1C  8.2*            Failed - Recent (6 mo) or future (30 days) visit within the authorizing provider's specialty    Patient had office visit in the last 6 months or has a visit in the next 30 days with authorizing provider or within the authorizing provider's specialty.  See \"Patient Info\" tab in inbasket, or \"Choose Columns\" in Meds & Orders section of the refill encounter.           Passed - Patient has a normal ALT within the past 12 mos.    Recent Labs   Lab Test  08/05/17   0845   ALT  37            Passed - Patient has a normal AST within the past 12 mos.     Recent Labs   Lab Test  08/05/17   0845   AST  14            Passed - Patient has had a Microalbumin in the past 12 mos.    Recent Labs   Lab Test  08/05/17   0845   MICROL  59   UMALCR  56.73*            Passed - Diagnosis not CHF       Passed - Patient is age 18 or older       Passed - Patient has a normal serum Creatinine in the past 12 months    Recent Labs   Lab Test  08/05/17   0845   CR  0.90             "

## 2018-03-15 NOTE — LETTER
[facility]  [address] [place], [address]  [phone]        March 15, 2018    Francois Fontana                                                                                                                              [id number]  [facility] [address]            We recently received a call from your pharmacy requesting a refill of Pioglitazone.     A review of your chart indicates that an appointment is required with your provider for diabetic visit with Emily Baltazar.  Fasting labs due this time also at visit. Please call the clinic at [phone] to schedule your appointment.     We have authorized one refill of your medication to allow time for you to schedule your appointment.      Taking care of your health is important to us and ongoing visits with your provider are vital to your care.  We look forward to seeing you in the near future.     Sincerely,     [facility]

## 2018-03-15 NOTE — TELEPHONE ENCOUNTER
Due for visit.  Also due for fasting and non-fasting labs.  One month sent.  Letter sent.  Rosio Nieves RN

## 2018-05-04 DIAGNOSIS — E78.5 HYPERLIPIDEMIA LDL GOAL <70: ICD-10-CM

## 2018-05-04 DIAGNOSIS — I10 ESSENTIAL HYPERTENSION WITH GOAL BLOOD PRESSURE LESS THAN 140/90: ICD-10-CM

## 2018-05-04 DIAGNOSIS — E11.65 TYPE 2 DIABETES MELLITUS WITH HYPERGLYCEMIA, WITHOUT LONG-TERM CURRENT USE OF INSULIN (H): ICD-10-CM

## 2018-05-04 NOTE — TELEPHONE ENCOUNTER
3/15/18- one month Actos sent and letter sent.  No upcoming visit.  T'd up for limited.  Sent to Sherri.  Rosio Nieves RN

## 2018-05-10 RX ORDER — LANCETS
EACH MISCELLANEOUS
Qty: 100 EACH | Refills: 0 | Status: SHIPPED | OUTPATIENT
Start: 2018-05-10 | End: 2020-06-26

## 2018-05-10 RX ORDER — LIRAGLUTIDE 6 MG/ML
INJECTION SUBCUTANEOUS
Qty: 27 ML | Refills: 0 | Status: SHIPPED | OUTPATIENT
Start: 2018-05-10 | End: 2020-06-29

## 2018-05-10 RX ORDER — LISINOPRIL AND HYDROCHLOROTHIAZIDE 12.5; 2 MG/1; MG/1
TABLET ORAL
Qty: 14 TABLET | Refills: 0 | Status: SHIPPED | OUTPATIENT
Start: 2018-05-10 | End: 2020-06-26

## 2018-06-02 DIAGNOSIS — E78.5 HYPERLIPIDEMIA LDL GOAL <70: ICD-10-CM

## 2018-06-02 DIAGNOSIS — E11.65 TYPE 2 DIABETES MELLITUS WITH HYPERGLYCEMIA, WITHOUT LONG-TERM CURRENT USE OF INSULIN (H): Primary | ICD-10-CM

## 2018-06-02 DIAGNOSIS — I10 ESSENTIAL HYPERTENSION WITH GOAL BLOOD PRESSURE LESS THAN 140/90: ICD-10-CM

## 2018-06-04 NOTE — TELEPHONE ENCOUNTER
"Requested Prescriptions   Pending Prescriptions Disp Refills     B-D U/F insulin pen needle [Pharmacy Med Name: B-D UF MINI PENNEEDLMIS]  Last Written Prescription Date:  8/18/2017  Last Fill Quantity: 100 each,  # refills: 3   Last office visit: 8/18/2017 with prescribing provider:  Len    100 each 3     Sig: USE ONE DAILY OR AS DIRECTED    Diabetic Supplies Protocol Failed    6/2/2018  3:39 PM       Failed - Recent (6 mo) or future (30 days) visit within the authorizing provider's specialty    Patient had office visit in the last 6 months or has a visit in the next 30 days with authorizing provider.  See \"Patient Info\" tab in inbasket, or \"Choose Columns\" in Meds & Orders section of the refill encounter.           Passed - Patient is 18 years of age or older          "

## 2018-06-06 RX ORDER — FLURBIPROFEN SODIUM 0.3 MG/ML
SOLUTION/ DROPS OPHTHALMIC
Qty: 100 EACH | Refills: 0 | Status: SHIPPED | OUTPATIENT
Start: 2018-06-06 | End: 2020-08-28

## 2018-06-06 RX ORDER — PIOGLITAZONEHYDROCHLORIDE 45 MG/1
45 TABLET ORAL DAILY
Qty: 30 TABLET | Refills: 0 | Status: SHIPPED | OUTPATIENT
Start: 2018-06-06 | End: 2020-07-14

## 2018-06-06 NOTE — TELEPHONE ENCOUNTER
Overdue for visit.  Have sent 1 month Actos and letter and also limited Acots.  Sent to provider.  Please advise.  Rosio Nieves RN

## 2018-06-14 DIAGNOSIS — L71.9 ROSACEA: ICD-10-CM

## 2018-06-14 RX ORDER — DOXYCYCLINE 100 MG/1
CAPSULE ORAL
Qty: 60 CAPSULE | Refills: 2 | OUTPATIENT
Start: 2018-06-14

## 2018-06-14 NOTE — TELEPHONE ENCOUNTER
doxycycline (VIBRAMYCIN) 100 MG capsule      Last Written Prescription Date:  05/23/2017  Last Fill Quantity: 180 capsule,   # refills: 3  Last Office Visit: 05/23/2017  Future Office visit:       Routing refill request to provider for review/approval because:  Drug not on the [facility], [facility] or [facility] refill protocol or controlled substance      Calixto MATHIS

## 2018-06-14 NOTE — TELEPHONE ENCOUNTER
Pt states he goes to [facility] now.     RX denied with note to send to provider at [facility]     Pt declined to call pharmacy to let them know this     Rut Patel, RN

## 2018-06-29 DIAGNOSIS — E78.5 HYPERLIPIDEMIA LDL GOAL <70: ICD-10-CM

## 2018-06-29 DIAGNOSIS — I10 ESSENTIAL HYPERTENSION WITH GOAL BLOOD PRESSURE LESS THAN 140/90: ICD-10-CM

## 2018-07-02 NOTE — TELEPHONE ENCOUNTER
L/M to call.  Rosio Nieves RN    Me      9:42 AM   Note      Overdue for visit.  Have sent 1 month Actos and letter and also limited Acots.  Sent to provider.  Please advise.  Rosio Nieves RN

## 2018-07-02 NOTE — TELEPHONE ENCOUNTER
"Requested Prescriptions   Pending Prescriptions Disp Refills     B-D U/F insulin pen needle [Pharmacy Med Name: B-D UF MINI PENNEEDLMIS]  Last Written Prescription Date:  6/6/2018  Last Fill Quantity: 100 each,  # refills: 0   Last office visit: 8/18/2017 with prescribing provider:  Len    100 each 0     Sig: USE 1  ONCE DAILY AS DIRECTED    Diabetic Supplies Protocol Failed    6/29/2018  4:09 PM       Failed - Recent (6 mo) or future (30 days) visit within the authorizing provider's specialty    Patient had office visit in the last 6 months or has a visit in the next 30 days with authorizing provider.  See \"Patient Info\" tab in inbasket, or \"Choose Columns\" in Meds & Orders section of the refill encounter.           Passed - Patient is 18 years of age or older          "

## 2018-07-03 RX ORDER — FLURBIPROFEN SODIUM 0.3 MG/ML
SOLUTION/ DROPS OPHTHALMIC
Qty: 100 EACH | Refills: 0
Start: 2018-07-03

## 2018-07-03 NOTE — TELEPHONE ENCOUNTER
LM to schedule appt asap    Last OV with Sherri Mayer 8.18.17  Last OV with PCP 5.23.17    See note from refill request earlier that pt states is now going to [facility]    Yue Duckworth RN, BS  Clinical Nurse Triage.

## 2019-03-13 ENCOUNTER — TRANSFERRED RECORDS (OUTPATIENT)
Dept: HEALTH INFORMATION MANAGEMENT | Facility: CLINIC | Age: 64
End: 2019-03-13

## 2019-11-25 ENCOUNTER — TRANSFERRED RECORDS (OUTPATIENT)
Dept: HEALTH INFORMATION MANAGEMENT | Facility: CLINIC | Age: 64
End: 2019-11-25

## 2020-02-10 ENCOUNTER — HEALTH MAINTENANCE LETTER (OUTPATIENT)
Age: 65
End: 2020-02-10

## 2020-06-10 ENCOUNTER — TELEPHONE (OUTPATIENT)
Dept: ENDOCRINOLOGY | Facility: CLINIC | Age: 65
End: 2020-06-10

## 2020-06-10 NOTE — TELEPHONE ENCOUNTER
Patient calling, returning after going to [facility] for past few years. He has a phone visit scheduled with Sherri Mayer for 6/26/20. He is wondering if Sherri is able to place an order to check his A1C before the visit. Please advise and return patient call      PH:[phone]      Clary Askew-Patient Rep

## 2020-06-11 NOTE — TELEPHONE ENCOUNTER
Called pt, discussed at length, pt not participating in care everywhere, talked to staff, informs to talk to [facility] and get records sent, pt informs also working with [employer], discussed at length, should also have PCP, dicussed PCP vs endo, pt prefers LS handles DM, recommend pt follow up with [facility] to send records, sign up for care everywhere if able, call us around 1 week to follow up if records received and have LS review and order labs if able    Louisa Barahona RN, BSN  Message handled by CLINIC NURSE.

## 2020-06-11 NOTE — TELEPHONE ENCOUNTER
Please check to see when other labs were last done, if he knows.  Otherwise, maybe we should do all the labs (CMP, lipids, urine microalbumin)? A1c order was placed.  Thanks,  Sherri Mayer NP  Endocrinology

## 2020-06-26 ENCOUNTER — TELEPHONE (OUTPATIENT)
Dept: FAMILY MEDICINE | Facility: CLINIC | Age: 65
End: 2020-06-26

## 2020-06-26 ENCOUNTER — VIRTUAL VISIT (OUTPATIENT)
Dept: ENDOCRINOLOGY | Facility: CLINIC | Age: 65
End: 2020-06-26
Payer: COMMERCIAL

## 2020-06-26 DIAGNOSIS — E11.21 TYPE 2 DIABETES MELLITUS WITH DIABETIC NEPHROPATHY, WITHOUT LONG-TERM CURRENT USE OF INSULIN (H): Primary | ICD-10-CM

## 2020-06-26 DIAGNOSIS — I10 HYPERTENSION GOAL BP (BLOOD PRESSURE) < 140/90: ICD-10-CM

## 2020-06-26 PROCEDURE — 99215 OFFICE O/P EST HI 40 MIN: CPT | Mod: TEL | Performed by: CLINICAL NURSE SPECIALIST

## 2020-06-26 RX ORDER — HYDROCHLOROTHIAZIDE 25 MG/1
25 TABLET ORAL DAILY
COMMUNITY
Start: 2020-06-12 | End: 2020-07-14

## 2020-06-26 RX ORDER — AMLODIPINE BESYLATE 10 MG/1
10 TABLET ORAL DAILY
COMMUNITY
Start: 2020-06-24 | End: 2020-07-03

## 2020-06-26 RX ORDER — ASPIRIN 81 MG/1
81 TABLET ORAL DAILY
COMMUNITY

## 2020-06-26 RX ORDER — DOXYCYCLINE 100 MG/1
100 CAPSULE ORAL 2 TIMES DAILY
COMMUNITY
Start: 2020-06-12 | End: 2020-07-14

## 2020-06-26 NOTE — LETTER
"    6/26/2020         RE: Francois Fontana  [address] [facility] [address]        Dear Colleague,    Thank you for referring your patient, Francois Fontana, to the [facility]. Please see a copy of my visit note below.    Francois Fontana is a 64 year old male who is being evaluated via a billable telephone visit due to the COVID-19 pandemic.      The patient has been notified of following:     \"This telephone visit will be conducted via a call between you and your physician/provider. We have found that certain health care needs can be provided without the need for a physical exam.  This service lets us provide the care you need with a short phone conversation.  If a prescription is necessary we can send it directly to your pharmacy.  If lab work is needed we can place an order for that and you can then stop by our lab to have the test done at a later time.    Telephone visits are billed at different rates depending on your insurance coverage. During this emergency period, for some insurers they may be billed the same as an in-person visit.  Please reach out to your insurance provider with any questions.    If during the course of the call the physician/provider feels a telephone visit is not appropriate, you will not be charged for this service.\"    Patient has given verbal consent for Telephone visit?  Yes    What phone number would you like to be contacted at? [phone]    How would you like to obtain your AVS? Mail a copy    Name: Francois Fontana (Last seen 8/18/2017).  HPI:  Francois Fontana is a 64 year old male who presents via phone visit for the evaluation/management of    1. Type 2 DM:  Originally diagnosed in 1991.  Was not symptomatic at the time, was diagnosed during routine labs.  Initially treated with glyburide, metformin was later added.  Actos was last added 5 years ago.      Current Regimen: Metformin 1000 mg bid, glipizide XL 20 mg/day,  Actos 45 mg qd, and " Victoza 1.8 mg once daily.    BS checks: Not testing  Average Meter Download: Unable to download meter remotely.    Has been seeing a provider at [facility] for his diabetes.  Recent A1c increased 7.1% (2019)-->8.5%-->10.4% (2019).  States he was eating a lot of fast food.  Has since stopped eating fast food and is watching his diet more carefully.  Reports fasting glucose now ranging 100-130's.      Complications:   Diabetes Complications  Description / Detail    Diabetic Retinopathy  No, last exam < 1 year ago   CAD / PAD  No   Neuropathy  No   Nephropathy / Microalbuminuria  Yes: elevated urine microalbumin noted for the first time 2015   Gastroparesis  No   Hypoglycemia Unawareness  No     2. Hypertension: Blood Pressure:   BP Readings from Last 3 Encounters:   17 137/84   17 126/70   17 138/70   Currently treated with Amlodipine 10 mg daily and hydrochlorothiazide 25 mg daily.  Previously treated with lisinopril.  BP was controlled while on lisinopril but lisinopril was discontinued due to elevated potassium.  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  3. Hyperlipidemia: Takes Simvastatin 20 mg qd for lipid control.       4. Prevention:  Flu Shot- recommend annually  Pneumovax- 2006  Opthalmology-Yes: 2016  Dental-  ASA-No, excessive bruising from ASA  Smoking- No  PMH/PSH:  Past Medical History:   Diagnosis Date     Rosacea      Type II or unspecified type diabetes mellitus without mention of complication, not stated as uncontrolled     Abstracted 02     Unspecified essential hypertension      Past Surgical History:   Procedure Laterality Date     HERNIA REPAIR, INGUINAL RT/LT      right     Family Hx:  Family History   Problem Relation Age of Onset     Cancer Father          of lung cancer     C.A.D. Father         Mult bypass operations     Diabetes Type 2  Father      Prostate Cancer Father      Respiratory Mother           of COPD     Gastrointestinal Disease Brother         Hemochromatosis     Gastrointestinal Disease Brother         Hemochromatosis     Gastrointestinal Disease Brother         Hemochromatosis ( in 3 bros)     Thyroid disease:         DM2:         Autoimmune: DM1, SLE, RA, Vitiligo     Social Hx:  Social History     Socioeconomic History     Marital status:      Spouse name: Not on file     Number of children: Not on file     Years of education: Not on file     Highest education level: Not on file   Occupational History     Not on file   Social Needs     Financial resource strain: Not on file     Food insecurity     Worry: Not on file     Inability: Not on file     Transportation needs     Medical: Not on file     Non-medical: Not on file   Tobacco Use     Smoking status: Former Smoker     Last attempt to quit: 1985     Years since quittin.9     Smokeless tobacco: Never Used     Tobacco comment: smoked socially   Substance and Sexual Activity     Alcohol use: No     Alcohol/week: 0.0 standard drinks     Comment: quit drinking age 30     Drug use: No     Sexual activity: Yes     Partners: Female   Lifestyle     Physical activity     Days per week: Not on file     Minutes per session: Not on file     Stress: Not on file   Relationships     Social connections     Talks on phone: Not on file     Gets together: Not on file     Attends Scientology service: Not on file     Active member of club or organization: Not on file     Attends meetings of clubs or organizations: Not on file     Relationship status: Not on file     Intimate partner violence     Fear of current or ex partner: Not on file     Emotionally abused: Not on file     Physically abused: Not on file     Forced sexual activity: Not on file   Other Topics Concern     Parent/sibling w/ CABG, MI or angioplasty before 65F 55M? Yes   Social History Narrative     Not on file          MEDICATIONS:  has a current medication list which includes the  following prescription(s): amlodipine, aspirin, b-d u/f, blood glucose, doxycycline hyclate, glipizide, hydrochlorothiazide, metformin, order for dme, pioglitazone, semaglutide, simvastatin, victoza pen, and metronidazole.    ROS     ROS: 10 point ROS neg other than the symptoms noted above in the HPI.    Objective   Reported vitals:  There were no vitals taken for this visit.   healthy, alert and no distress  PSYCH: Alert and oriented times 3; coherent speech, normal   rate and volume, able to articulate logical thoughts, able   to abstract reason, no tangential thoughts, no hallucinations   or delusions  His affect is normal and pleasant  RESP: No cough, no audible wheezing, able to talk in full sentences  Remainder of exam unable to be completed due to telephone visits  dial affect.    LABS:  A1c:  10.4% (12/2/2019)  Component      Latest Ref Rng & Units 5/6/2016 9/15/2016 1/13/2017 8/5/2017   Hemoglobin A1C      4.3 - 6.0 % 6.5 (H) 7.6 (H) 8.4 (H) 8.2 (H)     CMP:  !COMPREHENSIVE Latest Ref Rng 5/28/2015 5/6/2016   SODIUM 133 - 144 mmol/L 137 138   POTASSIUM 3.4 - 5.3 mmol/L 4.2 4.5   CHLORIDE 94 - 109 mmol/L 105 107   BUN 7 - 30 mg/dL 23 22   Creatinine 0.66 - 1.25 mg/dL 1.14 0.95   Glucose 70 - 99 mg/dL 164 (H) 120 (H)   ANION GAP 3 - 14 mmol/L 10 7   CALCIUM 8.5 - 10.1 mg/dL 8.8 8.8     LFTS/Cholesterol Panel:  !LIPID/HEPATIC Latest Ref Rng 12/19/2013 11/17/2015   CHOLESTEROL <200 mg/dL 154 149   TRIGLYCERIDES 0 - 150 mg/dL 221 (H) 196 (H)   HDL CHOLESTEROL >40 mg/dL 39 (L) 40 (L)   LDL CHOLESTEROL, CALCULATED 0 - 129 mg/dL 71 70   VLDL-CHOLESTEROL 0 - 30 mg/dL 44 (H) Pending   CHOLESTEROL/HDL RATIO 0.0 - 5.0 4.0 Pending   AST 0 - 55 U/L     ALT 0 - 70 U/L       Thyroid Function:   !THYROID Latest Ref Rng 5/6/2016 5/28/2015 6/12/2013 11/4/2010   TSH 0.40 - 4.00 mU/L 1.59 0.94 1.60 1.12     Urine MicroAlbumin:  Component    Latest Ref Rng 12/19/2013 5/28/2015 5/6/2016   Creatinine Urine     128 136 100    Microalbumin Urine     19 72 31   Microalbumin mg/g Cr    0 - 17 mg/g Cr 14.84 52.72 (H) 31.40 (H)     Vitamin D:    All pertinent notes, labs, and images personally reviewed by me.     A/P  Mr.Raymond COLETTE Fontana is a 64 year old being evaluated via phone visit for:    1. DM2 - Unontrolled.  Reports recent am glucose significantly improved with diet changes.  Discontinue Victoza.  Start Ozempic 1 mg weekly.  Continue Metformin 1000 mg bid, glipizide 20 mg/day, and Actos 45 mg daily.  He was encouraged to do some blood sugar testing at different times of the day, including 2 hours after eating.    There is some variability among people, most will usually develop symptoms suggestive of hypoglycemia when blood glucose levels are lowered to the mid 60's. The first set of symptoms are called adrenergic. Patients may experience any of the following nervousness, sweating, intense hunger, trembling, weakness, palpitations, and difficulty speaking.   The acute management of hypoglycemia involves the rapid delivery of a source of easily absorbed sugar. Regular soda, juice, lifesavers, table sugar, are good options. 15 grams of glucose is the dose that is given, followed by an assessment of symptoms and a blood glucose check if possible. If after 10 minutes there is no improvement, another 10-15 grams should be given. This can be repeated up to three times. The equivalency of 10-15 grams of glucose (approximate servings) are: 3-5 hard candies, 3 teaspoons of sugar, or 1/2 cup of regular soda or juice.   2. Hypertension - Uncontrolled.  MTM referral provided for HTN management.      Labs ordered today:   Orders Placed This Encounter   Procedures     Basic metabolic panel     Hemoglobin A1c     MED THERAPY MANAGE REFERRAL       Radiology/Consults ordered today: MED THERAPY MANAGE REFERRAL      Follow-up:  follow up in 3-4 month(s)    Sherri Mayer NP  Endocrinology  [facility]  CC: Calixto Merino    Phone call  duration:  45 minutes.  Call start time: 8:30 am; call end time: 9:15 am.    Again, thank you for allowing me to participate in the care of your patient.        Sincerely,        LILIANA Tan CNP

## 2020-06-26 NOTE — PROGRESS NOTES
"Francois Fontana is a 64 year old male who is being evaluated via a billable telephone visit due to the COVID-19 pandemic.      The patient has been notified of following:     \"This telephone visit will be conducted via a call between you and your physician/provider. We have found that certain health care needs can be provided without the need for a physical exam.  This service lets us provide the care you need with a short phone conversation.  If a prescription is necessary we can send it directly to your pharmacy.  If lab work is needed we can place an order for that and you can then stop by our lab to have the test done at a later time.    Telephone visits are billed at different rates depending on your insurance coverage. During this emergency period, for some insurers they may be billed the same as an in-person visit.  Please reach out to your insurance provider with any questions.    If during the course of the call the physician/provider feels a telephone visit is not appropriate, you will not be charged for this service.\"    Patient has given verbal consent for Telephone visit?  Yes    What phone number would you like to be contacted at? [phone]    How would you like to obtain your AVS? Mail a copy    Name: Francois Fontana (Last seen 8/18/2017).  HPI:  Francois Fontana is a 64 year old male who presents via phone visit for the evaluation/management of    1. Type 2 DM:  Originally diagnosed in 1991.  Was not symptomatic at the time, was diagnosed during routine labs.  Initially treated with glyburide, metformin was later added.  Actos was last added 5 years ago.      Current Regimen: Metformin 1000 mg bid, glipizide XL 20 mg/day,  Actos 45 mg qd, and Victoza 1.8 mg once daily.    BS checks: Not testing  Average Meter Download: Unable to download meter remotely.    Has been seeing a provider at [facility] for his diabetes.  Recent A1c increased 7.1% (6/2019)-->8.5%-->10.4% (12/2019).  States he " was eating a lot of fast food.  Has since stopped eating fast food and is watching his diet more carefully.  Reports fasting glucose now ranging 100-130's.      Complications:   Diabetes Complications  Description / Detail    Diabetic Retinopathy  No, last exam < 1 year ago   CAD / PAD  No   Neuropathy  No   Nephropathy / Microalbuminuria  Yes: elevated urine microalbumin noted for the first time 2015   Gastroparesis  No   Hypoglycemia Unawareness  No     2. Hypertension: Blood Pressure:   BP Readings from Last 3 Encounters:   17 137/84   17 126/70   17 138/70   Currently treated with Amlodipine 10 mg daily and hydrochlorothiazide 25 mg daily.  Previously treated with lisinopril.  BP was controlled while on lisinopril but lisinopril was discontinued due to elevated potassium.  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  3. Hyperlipidemia: Takes Simvastatin 20 mg qd for lipid control.       4. Prevention:  Flu Shot- recommend annually  Pneumovax- 2006  Opthalmology-Yes: 2016  Dental-  ASA-No, excessive bruising from ASA  Smoking- No  PMH/PSH:  Past Medical History:   Diagnosis Date     Rosacea      Type II or unspecified type diabetes mellitus without mention of complication, not stated as uncontrolled     Abstracted 02     Unspecified essential hypertension      Past Surgical History:   Procedure Laterality Date     HERNIA REPAIR, INGUINAL RT/LT      right     Family Hx:  Family History   Problem Relation Age of Onset     Cancer Father          of lung cancer     C.A.D. Father         Mult bypass operations     Diabetes Type 2  Father      Prostate Cancer Father      Respiratory Mother          of COPD     Gastrointestinal Disease Brother         Hemochromatosis     Gastrointestinal Disease Brother         Hemochromatosis     Gastrointestinal Disease Brother         Hemochromatosis ( in 3 bros)     Thyroid disease:         DM2:          Autoimmune: DM1, SLE, RA, Vitiligo     Social Hx:  Social History     Socioeconomic History     Marital status:      Spouse name: Not on file     Number of children: Not on file     Years of education: Not on file     Highest education level: Not on file   Occupational History     Not on file   Social Needs     Financial resource strain: Not on file     Food insecurity     Worry: Not on file     Inability: Not on file     Transportation needs     Medical: Not on file     Non-medical: Not on file   Tobacco Use     Smoking status: Former Smoker     Last attempt to quit: 1985     Years since quittin.9     Smokeless tobacco: Never Used     Tobacco comment: smoked socially   Substance and Sexual Activity     Alcohol use: No     Alcohol/week: 0.0 standard drinks     Comment: quit drinking age 30     Drug use: No     Sexual activity: Yes     Partners: Female   Lifestyle     Physical activity     Days per week: Not on file     Minutes per session: Not on file     Stress: Not on file   Relationships     Social connections     Talks on phone: Not on file     Gets together: Not on file     Attends Yazidism service: Not on file     Active member of club or organization: Not on file     Attends meetings of clubs or organizations: Not on file     Relationship status: Not on file     Intimate partner violence     Fear of current or ex partner: Not on file     Emotionally abused: Not on file     Physically abused: Not on file     Forced sexual activity: Not on file   Other Topics Concern     Parent/sibling w/ CABG, MI or angioplasty before 65F 55M? Yes   Social History Narrative     Not on file          MEDICATIONS:  has a current medication list which includes the following prescription(s): amlodipine, aspirin, b-d u/f, blood glucose, doxycycline hyclate, glipizide, hydrochlorothiazide, metformin, order for dme, pioglitazone, semaglutide, simvastatin, victoza pen, and metronidazole.    ROS     ROS: 10  point ROS neg other than the symptoms noted above in the HPI.    Objective   Reported vitals:  There were no vitals taken for this visit.   healthy, alert and no distress  PSYCH: Alert and oriented times 3; coherent speech, normal   rate and volume, able to articulate logical thoughts, able   to abstract reason, no tangential thoughts, no hallucinations   or delusions  His affect is normal and pleasant  RESP: No cough, no audible wheezing, able to talk in full sentences  Remainder of exam unable to be completed due to telephone visits  dial affect.    LABS:  A1c:  10.4% (12/2/2019)  Component      Latest Ref Rng & Units 5/6/2016 9/15/2016 1/13/2017 8/5/2017   Hemoglobin A1C      4.3 - 6.0 % 6.5 (H) 7.6 (H) 8.4 (H) 8.2 (H)     CMP:  !COMPREHENSIVE Latest Ref Rng 5/28/2015 5/6/2016   SODIUM 133 - 144 mmol/L 137 138   POTASSIUM 3.4 - 5.3 mmol/L 4.2 4.5   CHLORIDE 94 - 109 mmol/L 105 107   BUN 7 - 30 mg/dL 23 22   Creatinine 0.66 - 1.25 mg/dL 1.14 0.95   Glucose 70 - 99 mg/dL 164 (H) 120 (H)   ANION GAP 3 - 14 mmol/L 10 7   CALCIUM 8.5 - 10.1 mg/dL 8.8 8.8     LFTS/Cholesterol Panel:  !LIPID/HEPATIC Latest Ref Rng 12/19/2013 11/17/2015   CHOLESTEROL <200 mg/dL 154 149   TRIGLYCERIDES 0 - 150 mg/dL 221 (H) 196 (H)   HDL CHOLESTEROL >40 mg/dL 39 (L) 40 (L)   LDL CHOLESTEROL, CALCULATED 0 - 129 mg/dL 71 70   VLDL-CHOLESTEROL 0 - 30 mg/dL 44 (H) Pending   CHOLESTEROL/HDL RATIO 0.0 - 5.0 4.0 Pending   AST 0 - 55 U/L     ALT 0 - 70 U/L       Thyroid Function:   !THYROID Latest Ref Rng 5/6/2016 5/28/2015 6/12/2013 11/4/2010   TSH 0.40 - 4.00 mU/L 1.59 0.94 1.60 1.12     Urine MicroAlbumin:  Component    Latest Ref Rng 12/19/2013 5/28/2015 5/6/2016   Creatinine Urine     128 136 100   Microalbumin Urine     19 72 31   Microalbumin mg/g Cr    0 - 17 mg/g Cr 14.84 52.72 (H) 31.40 (H)     Vitamin D:    All pertinent notes, labs, and images personally reviewed by me.     A/P  Mr.Raymond COLETTE Fontana is a 64 year old being evaluated via  phone visit for:    1. DM2 - Unontrolled.  Reports recent am glucose significantly improved with diet changes.  Discontinue Victoza.  Start Ozempic 1 mg weekly.  Continue Metformin 1000 mg bid, glipizide 20 mg/day, and Actos 45 mg daily.  He was encouraged to do some blood sugar testing at different times of the day, including 2 hours after eating.    There is some variability among people, most will usually develop symptoms suggestive of hypoglycemia when blood glucose levels are lowered to the mid 60's. The first set of symptoms are called adrenergic. Patients may experience any of the following nervousness, sweating, intense hunger, trembling, weakness, palpitations, and difficulty speaking.   The acute management of hypoglycemia involves the rapid delivery of a source of easily absorbed sugar. Regular soda, juice, lifesavers, table sugar, are good options. 15 grams of glucose is the dose that is given, followed by an assessment of symptoms and a blood glucose check if possible. If after 10 minutes there is no improvement, another 10-15 grams should be given. This can be repeated up to three times. The equivalency of 10-15 grams of glucose (approximate servings) are: 3-5 hard candies, 3 teaspoons of sugar, or 1/2 cup of regular soda or juice.   2. Hypertension - Uncontrolled.  MTM referral provided for HTN management.      Labs ordered today:   Orders Placed This Encounter   Procedures     Basic metabolic panel     Hemoglobin A1c     MED THERAPY MANAGE REFERRAL       Radiology/Consults ordered today: MED THERAPY MANAGE REFERRAL      Follow-up:  follow up in 3-4 month(s)    Sherri Mayer NP  Endocrinology  [facility]  CC: Calixto Merino    Phone call duration:  45 minutes.  Call start time: 8:30 am; call end time: 9:15 am.

## 2020-06-30 ENCOUNTER — TELEPHONE (OUTPATIENT)
Dept: ENDOCRINOLOGY | Facility: CLINIC | Age: 65
End: 2020-06-30

## 2020-06-30 NOTE — TELEPHONE ENCOUNTER
Sent pt [employer] message on referral.    Kiki Javed, PharmD  Medication Therapy Management Provider, [facility]  Pager: [phone]

## 2020-06-30 NOTE — TELEPHONE ENCOUNTER
MTM referral from: [facility] [place] visit (referral by provider)    MTM referral outreach attempt #2 on June 30, 2020 at 12:46 PM      Outcome: Patient not reachable after several attempts, will route to MTM Pharmacist/Provider as an FYI. Thank you for the referral.    Kaela Tim, MTM Coordinator

## 2020-07-01 DIAGNOSIS — E11.21 TYPE 2 DIABETES MELLITUS WITH DIABETIC NEPHROPATHY, WITHOUT LONG-TERM CURRENT USE OF INSULIN (H): ICD-10-CM

## 2020-07-01 DIAGNOSIS — I10 HYPERTENSION GOAL BP (BLOOD PRESSURE) < 140/90: ICD-10-CM

## 2020-07-01 DIAGNOSIS — I10 HYPERTENSION GOAL BP (BLOOD PRESSURE) < 140/90: Primary | ICD-10-CM

## 2020-07-01 LAB
ANION GAP SERPL CALCULATED.3IONS-SCNC: 4 MMOL/L (ref 3–14)
BUN SERPL-MCNC: 23 MG/DL (ref 7–30)
CALCIUM SERPL-MCNC: 9.2 MG/DL (ref 8.5–10.1)
CHLORIDE SERPL-SCNC: 107 MMOL/L (ref 94–109)
CO2 SERPL-SCNC: 25 MMOL/L (ref 20–32)
CREAT SERPL-MCNC: 0.89 MG/DL (ref 0.66–1.25)
GFR SERPL CREATININE-BSD FRML MDRD: >90 ML/MIN/{1.73_M2}
GLUCOSE SERPL-MCNC: 102 MG/DL (ref 70–99)
HBA1C MFR BLD: 9.4 % (ref 0–5.6)
POTASSIUM SERPL-SCNC: 4.5 MMOL/L (ref 3.4–5.3)
SODIUM SERPL-SCNC: 136 MMOL/L (ref 133–144)

## 2020-07-01 PROCEDURE — 83036 HEMOGLOBIN GLYCOSYLATED A1C: CPT | Performed by: CLINICAL NURSE SPECIALIST

## 2020-07-01 PROCEDURE — 80048 BASIC METABOLIC PNL TOTAL CA: CPT | Performed by: CLINICAL NURSE SPECIALIST

## 2020-07-01 PROCEDURE — 36415 COLL VENOUS BLD VENIPUNCTURE: CPT | Performed by: CLINICAL NURSE SPECIALIST

## 2020-07-01 NOTE — TELEPHONE ENCOUNTER
Routing refill request to provider for review/approval because:  Labs out of range:  BP  Labs not current:  Creatinine

## 2020-07-03 RX ORDER — AMLODIPINE BESYLATE 10 MG/1
10 TABLET ORAL DAILY
Qty: 90 TABLET | Refills: 1 | Status: SHIPPED | OUTPATIENT
Start: 2020-07-03 | End: 2020-07-14

## 2020-07-03 NOTE — RESULT ENCOUNTER NOTE
Ray,  Your labs were normal except the A1c is high indicating your diabetes is not well controlled but at least the a1c has improved from 10.4%.  Hopefully the change from Victoza to Ozempic will help.  I received a message that Medical Therapy Management was unable to connect with your to schedule an appointment.  If you blood pressure is still elevated, I would encourage you to schedule that appointment.  If you have any questions, please let me know.  Otherwise, I'll see you in October and we'll recheck the A1c.  Sherri Mayer NP  Endocrinology

## 2020-07-06 NOTE — PROGRESS NOTES
MTM ENCOUNTER  SUBJECTIVE/OBJECTIVE:                           Francois Fontana is a 65 year old male called for an initial visit. He was referred to me from Sherri Mayer CNP.      Patient consented to a telehealth visit: yes  Telemedicine Visit Details  Type of service:  Telephone visit  Start Time: 9:31 AM  End Time: 9:58 AM  Originating Location (pt. Location): Home  Distant Location (provider location):  [facility] [place]  Mode of Communication:  Telephone    Chief Complaint: Referred for hypertension.    Allergies/ADRs: None  Tobacco:  reports that he quit smoking about 34 years ago. He has never used smokeless tobacco.  Alcohol: none  Caffeine: not much coffee in the last couple weeks, but usually 1-2 cups/day  Activity: exercise daily, weights and bike  PMH: Reviewed in EHR    Medication Adherence/Access: no issues reported, would like to establish care with new pcp at     Hypertension: Current medications include amlodipine 10mg daily and hydrochlorothiazide 25mg daily.  Patient does not self-monitor BP, will buy monitor.  Patient reports no current medication side effects. Previously on lisinopril but discontinued d/t elevated potassium. Last BP check for work failed at 146/80. Does not limit salt in diet.   BP Readings from Last 3 Encounters:   08/18/17 137/84   05/23/17 126/70   01/13/17 138/70     Last Comprehensive Metabolic Panel:  Sodium   Date Value Ref Range Status   07/01/2020 136 133 - 144 mmol/L Final     Potassium   Date Value Ref Range Status   07/01/2020 4.5 3.4 - 5.3 mmol/L Final     Chloride   Date Value Ref Range Status   07/01/2020 107 94 - 109 mmol/L Final     Carbon Dioxide   Date Value Ref Range Status   07/01/2020 25 20 - 32 mmol/L Final     Anion Gap   Date Value Ref Range Status   07/01/2020 4 3 - 14 mmol/L Final     Glucose   Date Value Ref Range Status   07/01/2020 102 (H) 70 - 99 mg/dL Final     Urea Nitrogen   Date Value Ref Range Status   07/01/2020 23 7 - 30  mg/dL Final     Creatinine   Date Value Ref Range Status   2020 0.89 0.66 - 1.25 mg/dL Final     GFR Estimate   Date Value Ref Range Status   2020 >90 >60 mL/min/[1.73_m2] Final     Comment:     Non  GFR Calc  Starting 2018, serum creatinine based estimated GFR (eGFR) will be   calculated using the Chronic Kidney Disease Epidemiology Collaboration   (CKD-EPI) equation.       Calcium   Date Value Ref Range Status   2020 9.2 8.5 - 10.1 mg/dL Final       Type 2 Diabetes:  Pt currently taking Victoza 1.8mg daily (Ozempic too costly not covered), metformin 1000mg BID, pioglitazone 45mg daily, and glipizide XL 20mg daily. Pt is not experiencing side effects.  SMB time(s) daily. Ranges (patient reported): Fasting- 120's  Symptoms of low blood sugar? none  Symptoms of high blood sugar? none  Eye exam: due  Foot exam: due  Diet/Exercise: exercises daily using weights and indoor bike. Working with endo.   Aspirin: Taking 81mg daily and denies side effects  Statin: Yes: simvastatin   ACEi/ARB: No.   Urine Albumin:   Lab Results   Component Value Date    UMALCR 56.73 (H) 2017     Lab Results   Component Value Date    A1C 9.4 2020    A1C 8.2 2017    A1C 8.4 2017    A1C 7.6 09/15/2016    A1C 6.5 2016     Hyperlipidemia: Current therapy includes simvastatin 20mg once daily.  Pt reports no significant myalgias or other side effects.  Recent Labs   Lab Test 17  1057 09/15/16  1043 11/17/15  1006 13  1347   CHOL 99 110 149 154   HDL 43 46 40* 39*   LDL 28 40 70 71   TRIG 140 122 196* 221*   CHOLHDLRATIO  --   --  3.7 4.0       Rosacea: Pt taking doxycycline 100mg BID and reports no issues.     Today's Vitals: There were no vitals taken for this visit. - telephone visit      ASSESSMENT:                              Medication Adherence: no issues identified    Hypertension: Needs improvement. BP not at goal <140/90. With diabetes and elevated  microalbumin level at last check, would benefit from ACEi/ARB therapy. Due to hx elevated potassium from lisinopril, would recommend trialing valsartan as this has a lower incidence of hyperkalemia. Hyperkalemia risk: lisinopril 2.2-6%, losartan 4% or greater, enalapril 1-3.8%, valsartan 2%, lower incidence with irbesartan (no percentage).     Type 2 Diabetes:  Needs improvement. A1c not at goal <7% but recent BG greatly improved and at goal fasting 80-130mg/dL. Will continue to monitor. Future may consider addition of SGLT-2i which may also be beneficial for his BP.     Hyperlipidemia: stable, due for lipid panel check with next labs    Rosacea: stable    PLAN:                            1. Start valsartan 80mg daily. Educated on use and side effects. BMP recheck in 3-4 weeks.  2. Lipid panel and microalbumin ordered per .     I spent 30 minutes with this patient today. All changes were made via verbal approval with Dr. Danna Montoya. A copy of the visit note was provided to the patient's primary care and referring provider.    Will follow up in 1 month.    The patient was sent via [employer] a summary of these recommendations.     Kiki Javed, PharmD  Medication Therapy Management Provider, [facility]  Pager: [phone]

## 2020-07-07 ENCOUNTER — ALLIED HEALTH/NURSE VISIT (OUTPATIENT)
Dept: PHARMACY | Facility: CLINIC | Age: 65
End: 2020-07-07
Payer: COMMERCIAL

## 2020-07-07 DIAGNOSIS — I10 HYPERTENSION GOAL BP (BLOOD PRESSURE) < 140/90: Primary | ICD-10-CM

## 2020-07-07 DIAGNOSIS — L71.9 ROSACEA: ICD-10-CM

## 2020-07-07 DIAGNOSIS — E78.5 HYPERLIPIDEMIA LDL GOAL <70: ICD-10-CM

## 2020-07-07 DIAGNOSIS — E11.65 TYPE 2 DIABETES MELLITUS WITH HYPERGLYCEMIA, WITHOUT LONG-TERM CURRENT USE OF INSULIN (H): ICD-10-CM

## 2020-07-07 PROCEDURE — 99605 MTMS BY PHARM NP 15 MIN: CPT | Performed by: PHARMACIST

## 2020-07-07 PROCEDURE — 99607 MTMS BY PHARM ADDL 15 MIN: CPT | Performed by: PHARMACIST

## 2020-07-07 RX ORDER — LIRAGLUTIDE 6 MG/ML
INJECTION SUBCUTANEOUS
COMMUNITY
Start: 2020-07-01 | End: 2020-07-14

## 2020-07-07 RX ORDER — VALSARTAN 80 MG/1
80 TABLET ORAL DAILY
Qty: 90 TABLET | Refills: 0 | Status: SHIPPED | OUTPATIENT
Start: 2020-07-07 | End: 2020-07-14

## 2020-07-07 NOTE — PATIENT INSTRUCTIONS
Recommendations from today's MTM visit:                                                    MTM (medication therapy management) is a service provided by a clinical pharmacist designed to help you get the most of out of your medicines.   Today we reviewed what your medicines are for, how to know if they are working, that your medicines are safe and how to make your medicine regimen as easy as possible.     1. Start valsartan 80mg daily.  2. Will plan on getting a BMP lab to recheck potassium level in 3-4 weeks.     It was great to speak with you today.  I value your experience and would be very thankful for your time with providing feedback on our clinic survey. You may receive a survey via email or text message in the next few days.     To schedule another MTM appointment, please call the clinic directly or you may call the MTM scheduling line at [phone] or toll-free at [phone].     My Clinical Pharmacist's contact information:                                                      It was a pleasure talking with you today!  Please feel free to contact me with any questions or concerns you have.      Kiki Javed, PharmD  Medication Therapy Management Provider, [facility]  Pager: [phone]

## 2020-07-07 NOTE — Clinical Note
Thanks for the referral. PRATIKI - Ozempic isn't covered for him so he's remaining on Victoza. BG have been looking good despite high A1c.

## 2020-07-14 ENCOUNTER — VIRTUAL VISIT (OUTPATIENT)
Dept: FAMILY MEDICINE | Facility: CLINIC | Age: 65
End: 2020-07-14
Payer: COMMERCIAL

## 2020-07-14 ENCOUNTER — TELEPHONE (OUTPATIENT)
Dept: FAMILY MEDICINE | Facility: CLINIC | Age: 65
End: 2020-07-14

## 2020-07-14 DIAGNOSIS — Z76.89 ENCOUNTER TO ESTABLISH CARE WITH NEW DOCTOR: Primary | ICD-10-CM

## 2020-07-14 DIAGNOSIS — L71.9 ROSACEA: ICD-10-CM

## 2020-07-14 DIAGNOSIS — I10 ESSENTIAL HYPERTENSION WITH GOAL BLOOD PRESSURE LESS THAN 140/90: ICD-10-CM

## 2020-07-14 DIAGNOSIS — E11.65 TYPE 2 DIABETES MELLITUS WITH HYPERGLYCEMIA, WITHOUT LONG-TERM CURRENT USE OF INSULIN (H): ICD-10-CM

## 2020-07-14 DIAGNOSIS — E78.5 HYPERLIPIDEMIA LDL GOAL <70: ICD-10-CM

## 2020-07-14 PROCEDURE — 99214 OFFICE O/P EST MOD 30 MIN: CPT | Mod: TEL | Performed by: FAMILY MEDICINE

## 2020-07-14 RX ORDER — SIMVASTATIN 20 MG
TABLET ORAL
Qty: 90 TABLET | Refills: 1 | Status: SHIPPED | OUTPATIENT
Start: 2020-07-14 | End: 2021-03-17

## 2020-07-14 RX ORDER — GLIPIZIDE 10 MG/1
20 TABLET, FILM COATED, EXTENDED RELEASE ORAL DAILY
Qty: 180 TABLET | Refills: 1 | Status: SHIPPED | OUTPATIENT
Start: 2020-07-14 | End: 2020-12-10

## 2020-07-14 RX ORDER — PIOGLITAZONEHYDROCHLORIDE 45 MG/1
45 TABLET ORAL DAILY
Qty: 90 TABLET | Refills: 1 | Status: SHIPPED | OUTPATIENT
Start: 2020-07-14 | End: 2020-12-10

## 2020-07-14 RX ORDER — LIRAGLUTIDE 6 MG/ML
INJECTION SUBCUTANEOUS
Qty: 9 ML | Refills: 5 | Status: SHIPPED | OUTPATIENT
Start: 2020-07-14 | End: 2021-04-08

## 2020-07-14 RX ORDER — DOXYCYCLINE 100 MG/1
100 CAPSULE ORAL 2 TIMES DAILY
Qty: 180 CAPSULE | Refills: 1 | Status: SHIPPED | OUTPATIENT
Start: 2020-07-14 | End: 2021-04-06

## 2020-07-14 RX ORDER — HYDROCHLOROTHIAZIDE 25 MG/1
25 TABLET ORAL DAILY
Qty: 90 TABLET | Refills: 1 | Status: SHIPPED | OUTPATIENT
Start: 2020-07-14 | End: 2020-12-10

## 2020-07-14 RX ORDER — VALSARTAN 80 MG/1
80 TABLET ORAL DAILY
Qty: 90 TABLET | Refills: 1 | Status: SHIPPED | OUTPATIENT
Start: 2020-07-14 | End: 2020-08-10 | Stop reason: DRUGHIGH

## 2020-07-14 RX ORDER — AMLODIPINE BESYLATE 10 MG/1
10 TABLET ORAL DAILY
Qty: 90 TABLET | Refills: 1 | Status: SHIPPED | OUTPATIENT
Start: 2020-07-14 | End: 2020-12-10

## 2020-07-14 ASSESSMENT — PATIENT HEALTH QUESTIONNAIRE - PHQ9
5. POOR APPETITE OR OVEREATING: NOT AT ALL
SUM OF ALL RESPONSES TO PHQ QUESTIONS 1-9: 1

## 2020-07-14 ASSESSMENT — ANXIETY QUESTIONNAIRES
2. NOT BEING ABLE TO STOP OR CONTROL WORRYING: NOT AT ALL
7. FEELING AFRAID AS IF SOMETHING AWFUL MIGHT HAPPEN: NOT AT ALL
5. BEING SO RESTLESS THAT IT IS HARD TO SIT STILL: NOT AT ALL
3. WORRYING TOO MUCH ABOUT DIFFERENT THINGS: NOT AT ALL
IF YOU CHECKED OFF ANY PROBLEMS ON THIS QUESTIONNAIRE, HOW DIFFICULT HAVE THESE PROBLEMS MADE IT FOR YOU TO DO YOUR WORK, TAKE CARE OF THINGS AT HOME, OR GET ALONG WITH OTHER PEOPLE: NOT DIFFICULT AT ALL
6. BECOMING EASILY ANNOYED OR IRRITABLE: NOT AT ALL

## 2020-07-14 NOTE — TELEPHONE ENCOUNTER
Left msg to call back to clinic.   Below msg per Dr.Coming Montoya./See check out report from 07/14/2020 visit.  0  schedule fasting lab appointment within one week.  Schedule follow up visit for 4 months for medication recheck- BP/Diabetes.  Ask if you can schedule his physical today, for anytime in the future.      Yari Cao MA  [facility].

## 2020-07-14 NOTE — PROGRESS NOTES
"Francois Fontana is a 65 year old male who is being evaluated via a billable telephone visit.      The patient has been notified of following:     \"This telephone visit will be conducted via a call between you and your physician/provider. We have found that certain health care needs can be provided without the need for a physical exam.  This service lets us provide the care you need with a short phone conversation.  If a prescription is necessary we can send it directly to your pharmacy.  If lab work is needed we can place an order for that and you can then stop by our lab to have the test done at a later time.    Telephone visits are billed at different rates depending on your insurance coverage. During this emergency period, for some insurers they may be billed the same as an in-person visit.  Please reach out to your insurance provider with any questions.    If during the course of the call the physician/provider feels a telephone visit is not appropriate, you will not be charged for this service.\"    Patient has given verbal consent for Telephone visit?  Yes    What phone number would you like to be contacted at? [phone]     How would you like to obtain your AVS? Ne Sampson     Francois Fontana is a 65 year old male who presents via phone visit today for the following health issues:    Establish Care    HPI  Hyperlipidemia Follow-Up      Are you regularly taking any medication or supplement to lower your cholesterol?   Yes    Are you having muscle aches or other side effects that you think could be caused by your cholesterol lowering medication?  No    Hypertension Follow-up      Do you check your blood pressure regularly outside of the clinic? No     Are you following a low salt diet? Yes    Are your blood pressures ever more than 140 on the top number (systolic) OR more   than 90 on the bottom number (diastolic), for example 140/90? Yes      How many servings of fruits and vegetables do you eat " daily?  0-1    On average, how many sweetened beverages do you drink each day (Examples: soda, juice, sweet tea, etc.  Do NOT count diet or artificially sweetened beverages)?   2    How many days per week do you exercise enough to make your heart beat faster? 7    How many minutes a day do you exercise enough to make your heart beat faster? 30 - 60    How many days per week do you miss taking your medication? 0     Needs to get a new PCP, was referred by Sherri Mayer.  Was previously at [facility].  Needs refills on all his medications for diabetes, cholesterol, blood pressure.  Working with Kiki Javed in [facility] as well.    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  NO other concerns today.      Patient Active Problem List   Diagnosis     Type 2 diabetes mellitus with hyperglycemia; A1c goal <7     Hypertension goal BP (blood pressure) < 140/90     Rosacea     Hyperlipidemia LDL goal <70     Stenosis of left subclavian artery (H)     Advanced directives, counseling/discussion     Family history of prostate cancer in father      Morbid obesity due to excess calories (H)     JUSTIN (obstructive sleep apnea)     Type 2 diabetes mellitus with diabetic nephropathy (H)     Past Surgical History:   Procedure Laterality Date     HERNIA REPAIR, INGUINAL RT/LT      right       Social History     Tobacco Use     Smoking status: Former Smoker     Last attempt to quit: 1985     Years since quittin.0     Smokeless tobacco: Never Used     Tobacco comment: quit    Substance Use Topics     Alcohol use: No     Alcohol/week: 0.0 standard drinks     Comment: quit drinking age 30     Family History   Problem Relation Age of Onset     Cancer Father          of lung cancer     C.A.D. Father         Mult bypass operations     Diabetes Type 2  Father      Prostate Cancer Father      Respiratory Mother          of COPD     Gastrointestinal Disease Brother          Hemochromatosis     Gastrointestinal Disease Brother         Hemochromatosis     Gastrointestinal Disease Brother         Hemochromatosis ( in 3 bros)         Current Outpatient Medications   Medication Sig Dispense Refill     amLODIPine (NORVASC) 10 MG tablet Take 1 tablet (10 mg) by mouth daily 90 tablet 1     aspirin 81 MG EC tablet Take 81 mg by mouth daily       B-D U/F insulin pen needle USE ONE DAILY OR AS DIRECTED 100 each 0     blood glucose monitoring (ACCU-CHEK SMARTVIEW) test strip Use to test blood sugar 3 times daily or as directed. 300 each 3     doxycycline hyclate (VIBRAMYCIN) 100 MG capsule Take 1 capsule (100 mg) by mouth 2 times daily 180 capsule 1     glipiZIDE (GLUCOTROL XL) 10 MG 24 hr tablet Take 2 tablets (20 mg) by mouth daily 180 tablet 1     hydrochlorothiazide (HYDRODIURIL) 25 MG tablet Take 1 tablet (25 mg) by mouth daily 90 tablet 1     metFORMIN (GLUCOPHAGE) 1000 MG tablet Take 1 tablet (1,000 mg) by mouth 2 times daily (with meals) 180 tablet 1     metroNIDAZOLE (METROGEL) 1 % gel Apply topically daily 60 g 11     pioglitazone (ACTOS) 45 MG tablet Take 1 tablet (45 mg) by mouth daily 90 tablet 1     simvastatin (ZOCOR) 20 MG tablet TAKE ONE TABLET BY MOUTH AT BEDTIME 90 tablet 1     valsartan (DIOVAN) 80 MG tablet Take 1 tablet (80 mg) by mouth daily 90 tablet 1     VICTOZA PEN 18 MG/3ML soln INJECT 1.8 MG SUBCUTANEOUSLY ONCE DAILY 9 mL 5     order for DME Patient Francois Fontana was set up at [place] on September 23, 2015. Patient received a [employer] DreamStation Auto CPAP Auto. Pressures were set at Auto 6 - 12 cm H2O.   Patient s ramp is 5 cm H2O for 30 min and FLEX/EPR is A Flex.  Patient received a [employer] Mask name: Wisp  Nasal mask Size Small, Medium, Large, heated tubing and heated humidifier.  Patient is enrolled in the STM Program and does need to meet compliance. Patient has a follow up on 11/4/15 with Bennett Goltz, PA.     Myra Lim  (entered by Nitin Wilson)       No Known Allergies  Recent Labs   Lab Test 07/01/20  0942 08/05/17  0845 01/13/17  1057 09/15/16  1043  05/06/16  0841  11/17/15  1006   A1C 9.4* 8.2* 8.4*  --    < > 6.5*   < > 9.7*   LDL  --   --  28 40  --   --   --  70   HDL  --   --  43 46  --   --   --  40*   TRIG  --   --  140 122  --   --   --  196*   ALT  --  37  --   --   --  36  --   --    CR 0.89 0.90  --   --   --  0.95  --   --    GFRESTIMATED >90 86  --   --   --  81  --   --    GFRESTBLACK >90 >90  African American GFR Calc    --   --   --  >90  African American GFR Calc    --   --    POTASSIUM 4.5 4.9  --   --   --  4.5  --   --    TSH  --  1.56  --   --   --  1.59  --   --     < > = values in this interval not displayed.      BP Readings from Last 3 Encounters:   08/18/17 137/84   05/23/17 126/70   01/13/17 138/70    Wt Readings from Last 3 Encounters:   08/18/17 125.5 kg (276 lb 11.2 oz)   05/23/17 124.7 kg (275 lb)   01/13/17 121.6 kg (268 lb)                    Reviewed and updated as needed this visit by Provider  Tobacco  Allergies  Meds  Problems  Med Hx  Surg Hx  Fam Hx  Soc Hx          Review of Systems   Constitutional, HEENT, cardiovascular, pulmonary, gi and gu systems are negative, except as otherwise noted.       Objective   Reported vitals:  There were no vitals taken for this visit.   healthy, alert and no distress  PSYCH: Alert and oriented times 3; coherent speech, normal   rate and volume, able to articulate logical thoughts, able   to abstract reason, no tangential thoughts, no hallucinations   or delusions  His affect is normal and pleasant  RESP: No cough, no audible wheezing, able to talk in full sentences  Remainder of exam unable to be completed due to telephone visits    Diagnostic Test Results:  Labs reviewed in Epic        Assessment/Plan:  1. Encounter to establish care with new doctor  Reviewed medical history, medications, allergies, etc.  Encouraged to schedule physical this  fall.    2. Type 2 diabetes mellitus with hyperglycemia, without long-term current use of insulin (H)  Will refill medications.  Labs typically ordered per Endocrinology.  Will work with them and MTM on controlling blood sugars.  Follow up in 4 months.  - glipiZIDE (GLUCOTROL XL) 10 MG 24 hr tablet; Take 2 tablets (20 mg) by mouth daily  Dispense: 180 tablet; Refill: 1  - metFORMIN (GLUCOPHAGE) 1000 MG tablet; Take 1 tablet (1,000 mg) by mouth 2 times daily (with meals)  Dispense: 180 tablet; Refill: 1  - pioglitazone (ACTOS) 45 MG tablet; Take 1 tablet (45 mg) by mouth daily  Dispense: 90 tablet; Refill: 1  - VICTOZA PEN 18 MG/3ML soln; INJECT 1.8 MG SUBCUTANEOUSLY ONCE DAILY  Dispense: 9 mL; Refill: 5    3. Hyperlipidemia LDL goal <70  Due for labs, will refill medications and make changes if needed pending results.  - simvastatin (ZOCOR) 20 MG tablet; TAKE ONE TABLET BY MOUTH AT BEDTIME  Dispense: 90 tablet; Refill: 1  - Lipid panel reflex to direct LDL Fasting; Future    4. Essential hypertension with goal blood pressure less than 140/90  Working with MTM to control BP.  Follow up in 4 months.  Pt has visit with MTM in 3 weeks.  - amLODIPine (NORVASC) 10 MG tablet; Take 1 tablet (10 mg) by mouth daily  Dispense: 90 tablet; Refill: 1  - hydrochlorothiazide (HYDRODIURIL) 25 MG tablet; Take 1 tablet (25 mg) by mouth daily  Dispense: 90 tablet; Refill: 1  - valsartan (DIOVAN) 80 MG tablet; Take 1 tablet (80 mg) by mouth daily  Dispense: 90 tablet; Refill: 1  - Lipid panel reflex to direct LDL Fasting; Future    5. Rosacea  Refill medications.  - doxycycline hyclate (VIBRAMYCIN) 100 MG capsule; Take 1 capsule (100 mg) by mouth 2 times daily  Dispense: 180 capsule; Refill: 1    Return in about 1 week (around 7/21/2020) for Lab Work Only- Fasting.      Phone call duration:  14 minutes    April SALMA Montoya MD

## 2020-07-22 DIAGNOSIS — I10 ESSENTIAL HYPERTENSION WITH GOAL BLOOD PRESSURE LESS THAN 140/90: ICD-10-CM

## 2020-07-22 DIAGNOSIS — E78.5 HYPERLIPIDEMIA LDL GOAL <70: ICD-10-CM

## 2020-07-22 DIAGNOSIS — E11.65 TYPE 2 DIABETES MELLITUS WITH HYPERGLYCEMIA, WITHOUT LONG-TERM CURRENT USE OF INSULIN (H): ICD-10-CM

## 2020-07-22 DIAGNOSIS — I10 HYPERTENSION GOAL BP (BLOOD PRESSURE) < 140/90: ICD-10-CM

## 2020-07-22 LAB
ANION GAP SERPL CALCULATED.3IONS-SCNC: 4 MMOL/L (ref 3–14)
BUN SERPL-MCNC: 26 MG/DL (ref 7–30)
CALCIUM SERPL-MCNC: 8.7 MG/DL (ref 8.5–10.1)
CHLORIDE SERPL-SCNC: 109 MMOL/L (ref 94–109)
CHOLEST SERPL-MCNC: 94 MG/DL
CO2 SERPL-SCNC: 25 MMOL/L (ref 20–32)
CREAT SERPL-MCNC: 1.08 MG/DL (ref 0.66–1.25)
CREAT UR-MCNC: 66 MG/DL
GFR SERPL CREATININE-BSD FRML MDRD: 72 ML/MIN/{1.73_M2}
GLUCOSE SERPL-MCNC: 75 MG/DL (ref 70–99)
HDLC SERPL-MCNC: 41 MG/DL
LDLC SERPL CALC-MCNC: 32 MG/DL
MICROALBUMIN UR-MCNC: 100 MG/L
MICROALBUMIN/CREAT UR: 152.44 MG/G CR (ref 0–17)
NONHDLC SERPL-MCNC: 53 MG/DL
POTASSIUM SERPL-SCNC: 4.5 MMOL/L (ref 3.4–5.3)
SODIUM SERPL-SCNC: 138 MMOL/L (ref 133–144)
TRIGL SERPL-MCNC: 107 MG/DL

## 2020-07-22 PROCEDURE — 80048 BASIC METABOLIC PNL TOTAL CA: CPT | Performed by: FAMILY MEDICINE

## 2020-07-22 PROCEDURE — 80061 LIPID PANEL: CPT | Performed by: FAMILY MEDICINE

## 2020-07-22 PROCEDURE — 82043 UR ALBUMIN QUANTITATIVE: CPT | Performed by: FAMILY MEDICINE

## 2020-07-22 PROCEDURE — 36415 COLL VENOUS BLD VENIPUNCTURE: CPT | Performed by: FAMILY MEDICINE

## 2020-07-27 ENCOUNTER — TELEPHONE (OUTPATIENT)
Dept: FAMILY MEDICINE | Facility: CLINIC | Age: 65
End: 2020-07-27

## 2020-07-27 NOTE — TELEPHONE ENCOUNTER
Patient Quality Outreach      Summary:    Patient is due/failing the following:   Eye Exam and Colonoscopy    Type of outreach:    Phone, spoke to patient/parent. Pt agreeded to have and eye appt at [place]. Declined colonoscopy    Questions for provider review:    None                                                                                   **Start Working phrase here:**       Patient has the following on his problem list/HM: None                                                Moraima Zabala MA       Chart routed to none.

## 2020-08-04 ENCOUNTER — ALLIED HEALTH/NURSE VISIT (OUTPATIENT)
Dept: PHARMACY | Facility: CLINIC | Age: 65
End: 2020-08-04
Payer: COMMERCIAL

## 2020-08-04 DIAGNOSIS — E11.65 TYPE 2 DIABETES MELLITUS WITH HYPERGLYCEMIA, WITHOUT LONG-TERM CURRENT USE OF INSULIN (H): ICD-10-CM

## 2020-08-04 DIAGNOSIS — I10 HYPERTENSION GOAL BP (BLOOD PRESSURE) < 140/90: Primary | ICD-10-CM

## 2020-08-04 DIAGNOSIS — E78.5 HYPERLIPIDEMIA LDL GOAL <70: ICD-10-CM

## 2020-08-04 PROCEDURE — 99606 MTMS BY PHARM EST 15 MIN: CPT | Performed by: PHARMACIST

## 2020-08-04 NOTE — PROGRESS NOTES
MTM ENCOUNTER  SUBJECTIVE/OBJECTIVE:                           Francois Fontana is a 65 year old male called for a follow-up visit. He was referred to me from Sherri Mayer CNP.  Today's visit is a follow-up MTM visit from 7/7.     Patient consented to a telehealth visit: yes  Telemedicine Visit Details  Type of service:  Telephone visit  Start Time: 9:14 AM  End Time: 9:21 AM  Originating Location (pt. Location): Home  Distant Location (provider location):  [facility] [place]  Mode of Communication:  Telephone    Chief Complaint: valsartan f/up.    Tobacco:  reports that he quit smoking about 35 years ago. He has never used smokeless tobacco.  Alcohol: none    Medication Adherence/Access: no issues reported    Hypertension: Current medications include valsartan 80mg daily, amlodipine 10mg daily and hydrochlorothiazide 25mg daily.  Patient does not self-monitor BP.  Patient reports no current medication side effects. Previously on lisinopril but discontinued d/t elevated potassium. Last BP check for work failed at 146/80.  BP Readings from Last 3 Encounters:   08/18/17 137/84   05/23/17 126/70   01/13/17 138/70     Last Comprehensive Metabolic Panel:  Sodium   Date Value Ref Range Status   07/22/2020 138 133 - 144 mmol/L Final     Potassium   Date Value Ref Range Status   07/22/2020 4.5 3.4 - 5.3 mmol/L Final     Chloride   Date Value Ref Range Status   07/22/2020 109 94 - 109 mmol/L Final     Carbon Dioxide   Date Value Ref Range Status   07/22/2020 25 20 - 32 mmol/L Final     Anion Gap   Date Value Ref Range Status   07/22/2020 4 3 - 14 mmol/L Final     Glucose   Date Value Ref Range Status   07/22/2020 75 70 - 99 mg/dL Final     Comment:     Fasting specimen     Urea Nitrogen   Date Value Ref Range Status   07/22/2020 26 7 - 30 mg/dL Final     Creatinine   Date Value Ref Range Status   07/22/2020 1.08 0.66 - 1.25 mg/dL Final     GFR Estimate   Date Value Ref Range Status   07/22/2020 72 >60  mL/min/[1.73_m2] Final     Comment:     Non  GFR Calc  Starting 2018, serum creatinine based estimated GFR (eGFR) will be   calculated using the Chronic Kidney Disease Epidemiology Collaboration   (CKD-EPI) equation.       Calcium   Date Value Ref Range Status   2020 8.7 8.5 - 10.1 mg/dL Final       Type 2 Diabetes:  Pt currently taking Victoza 1.8mg daily (Ozempic too costly not covered), metformin 1000mg BID, pioglitazone 45mg daily, and glipizide XL 20mg daily. Pt is not experiencing side effects.  SMB time(s) daily. Ranges (patient reported): Fasting- 120's  Symptoms of low blood sugar? none  Symptoms of high blood sugar? none  Eye exam: due - has appt next week  Foot exam: due  Diet/Exercise: exercises daily now using weights and indoor bike. Working with endo. Eating mostly veggies now- much improved diet.   Aspirin: Taking 81mg daily and denies side effects  Statin: Yes: simvastatin   ACEi/ARB: No.   Urine Albumin:   Lab Results   Component Value Date    UMALCR 152.44 (H) 2020     Lab Results   Component Value Date    A1C 9.4 2020    A1C 8.2 2017    A1C 8.4 2017    A1C 7.6 09/15/2016    A1C 6.5 2016     Hyperlipidemia: Current therapy includes simvastatin 20mg once daily.  Pt reports no significant myalgias or other side effects.  Recent Labs   Lab Test 20  0825 17  1057  11/17/15  1006 13  1347   CHOL 94 99   < > 149 154   HDL 41 43   < > 40* 39*   LDL 32 28   < > 70 71   TRIG 107 140   < > 196* 221*   CHOLHDLRATIO  --   --   --  3.7 4.0    < > = values in this interval not displayed.       Today's Vitals: There were no vitals taken for this visit.      ASSESSMENT:                              Medication Adherence: no issues identified    Hypertension: Due for BP check, he was unable to check at home so will check at nurse only visit. BMP stable. If BP still elevated >140/90, will increase valsartan dose.    Type 2 Diabetes:  Improved. Self monitoring of blood glucose is at goal of fasting  mg/dL.    Hyperlipidemia: stable    PLAN:                            1. No medication changes. BP check this week.     I spent 7 minutes with this patient today. All changes were made via collaborative practice agreement with Dr. Danna Montoya. A copy of the visit note was provided to the patient's primary care provider.    Will follow up in 3 days for BP check. If stable, then f/up in 6 months.    The patient declined a summary of these recommendations.     Kiki Javed, PharmD  Medication Therapy Management Provider, [facility]  Pager: [phone]

## 2020-08-07 ENCOUNTER — ALLIED HEALTH/NURSE VISIT (OUTPATIENT)
Dept: FAMILY MEDICINE | Facility: CLINIC | Age: 65
End: 2020-08-07

## 2020-08-07 VITALS — DIASTOLIC BLOOD PRESSURE: 60 MMHG | SYSTOLIC BLOOD PRESSURE: 142 MMHG

## 2020-08-07 PROCEDURE — 99207 ZZC DROP WITH A PROCEDURE: CPT

## 2020-08-07 NOTE — PROGRESS NOTES
Francois Fontana was evaluated at [facility] on August 7, 2020 at which time his blood pressure was:    BP Readings from Last 3 Encounters:   08/07/20 (!) 142/60   08/18/17 137/84   05/23/17 126/70     Pulse Readings from Last 3 Encounters:   08/18/17 98   05/23/17 93   01/13/17 74       Reviewed lifestyle modifications for blood pressure control and reduction: including making healthy food choices, managing weight, getting regular exercise, smoking cessation, reducing alcohol consumption, monitoring blood pressure regularly.     Symptoms: None    BP Goal:< 140/90 mmHg    BP Assessment:  BP too high    Potential Reasons for BP too high: Dose of BP medication too low    BP Follow-Up Plan: Recheck BP in 30 days at pharmacy    Recommendation to Provider: Look at dose increase, now     Note completed by: Telma Mayer RPH

## 2020-08-10 ENCOUNTER — TELEPHONE (OUTPATIENT)
Dept: FAMILY MEDICINE | Facility: CLINIC | Age: 65
End: 2020-08-10

## 2020-08-10 DIAGNOSIS — I10 HYPERTENSION GOAL BP (BLOOD PRESSURE) < 140/90: Primary | ICD-10-CM

## 2020-08-10 RX ORDER — VALSARTAN 160 MG/1
160 TABLET ORAL DAILY
Qty: 90 TABLET | Refills: 1 | Status: SHIPPED | OUTPATIENT
Start: 2020-08-10 | End: 2020-12-10

## 2020-08-10 NOTE — TELEPHONE ENCOUNTER
Taz Morin I was managing his BP during most recent MTM visits. Please call Ray to inform him to increase his valsartan to 160mg daily (doubled dose). I've sent a new prescription to the pharmacy for him. I would like him to return to clinic for a repeat BP check in 2-3 weeks with BMP lab only recheck to recheck his potassium with the increased dose.       Kiki Javed, PharmD  Medication Therapy Management Provider, [facility]  Pager: [phone]

## 2020-08-10 NOTE — TELEPHONE ENCOUNTER
Patient calls,    Currently taking Amlodipine, Valsartan and Hydrochlorothiazide for BP management. Patient is a  and needs to have his BP lowered in the next several weeks to continue to drive. Please advise and re-route to inform    Salvador Joseph RN

## 2020-08-14 ENCOUNTER — OFFICE VISIT (OUTPATIENT)
Dept: OPTOMETRY | Facility: CLINIC | Age: 65
End: 2020-08-14
Payer: COMMERCIAL

## 2020-08-14 DIAGNOSIS — H52.203 ASTIGMATISM OF BOTH EYES, UNSPECIFIED TYPE: ICD-10-CM

## 2020-08-14 DIAGNOSIS — H26.9 BILATERAL INCIPIENT CATARACTS: ICD-10-CM

## 2020-08-14 DIAGNOSIS — H52.4 PRESBYOPIA: ICD-10-CM

## 2020-08-14 DIAGNOSIS — E11.9 DIABETES MELLITUS WITHOUT OPHTHALMIC MANIFESTATIONS (H): Primary | ICD-10-CM

## 2020-08-14 PROCEDURE — 92004 COMPRE OPH EXAM NEW PT 1/>: CPT | Performed by: OPTOMETRIST

## 2020-08-14 PROCEDURE — 92015 DETERMINE REFRACTIVE STATE: CPT | Performed by: OPTOMETRIST

## 2020-08-14 ASSESSMENT — REFRACTION_MANIFEST
OD_CYLINDER: +1.75
OS_ADD: +2.25
OS_CYLINDER: +0.75
OD_SPHERE: -1.75
OD_AXIS: 104
METHOD_AUTOREFRACTION: 1
OS_AXIS: 092
OS_SPHERE: -0.50
OD_AXIS: 103
OS_SPHERE: -0.75
OD_ADD: +2.25
OS_CYLINDER: +0.75
OS_AXIS: 092
OD_SPHERE: -1.50
OD_CYLINDER: +2.00

## 2020-08-14 ASSESSMENT — EXTERNAL EXAM - RIGHT EYE: OD_EXAM: NORMAL

## 2020-08-14 ASSESSMENT — VISUAL ACUITY
OD_SC: 20/20
OS_SC: 20/20
METHOD: SNELLEN - LINEAR
OS_SC: 20/40+2
OS_SC+: -1
OD_SC: 20/60-1
OD_SC+: -2

## 2020-08-14 ASSESSMENT — CONF VISUAL FIELD
OD_NORMAL: 1
OS_NORMAL: 1
METHOD: COUNTING FINGERS

## 2020-08-14 ASSESSMENT — CUP TO DISC RATIO
OD_RATIO: 0.2
OS_RATIO: 0.2

## 2020-08-14 ASSESSMENT — EXTERNAL EXAM - LEFT EYE: OS_EXAM: NORMAL

## 2020-08-14 ASSESSMENT — TONOMETRY
OD_IOP_MMHG: 22
OS_IOP_MMHG: 20
IOP_METHOD: APPLANATION

## 2020-08-14 NOTE — LETTER
8/14/2020         RE: Francois Fontana  [address] [facility] [address]        Dear Colleague,    Thank you for referring your patient, Francois Fontana, to the [facility]. Please see a copy of my visit note below.    Chief Complaint   Patient presents with     Diabetic Eye Exam     JOYCE: 2018 - used to go to [facility]  No correction.   Stable vision, no concerns.     Lab Results   Component Value Date    A1C 9.4 07/01/2020    A1C 8.2 08/05/2017    A1C 8.4 01/13/2017    A1C 7.6 09/15/2016    A1C 6.5 05/06/2016     X 30y   Last Eye Exam: 2018  Dilated Previously: Yes    What are you currently using to see?  Readers - just started using readers a couple weeks ago.     Distance Vision Acuity: Satisfied with vision    Near Vision Acuity: Satisfied with vision while reading and using computer with readers    Eye Comfort: good  Do you use eye drops? : No  Occupation or Hobbies:     Ludivina Benson CPO     Medical, surgical and family histories reviewed and updated 8/14/2020.       OBJECTIVE: See Ophthalmology exam    ASSESSMENT:    ICD-10-CM    1. Presbyopia  H52.4       PLAN:    Francois Fontana aware  eye exam results will be sent to No Ref-Primary, Physician.    Mica Askew OD     Again, thank you for allowing me to participate in the care of your patient.        Sincerely,        Mica Askew, OD

## 2020-08-14 NOTE — PATIENT INSTRUCTIONS
Patient Education   Diabetes weakens the blood vessels all over the body, including the eyes. Damage to the blood vessels in the eyes can cause swelling or bleeding into part of the eye (called the retina). This is called diabetic retinopathy (LENO-tin--pu-thee). If not treated, this disease can cause vision loss or blindness.   Symptoms may include blurred or distorted vision, but many people have no symptoms. It's important to see your eye doctor regularly to check for problems.   Early treatment and good control can help protect your vision. Here are the things you can do to help prevent vision loss:      1. Keep your blood sugar levels under tight control.      2. Bring high blood pressure under control.      3. No smoking.      4. Have yearly dilated eye exams.  You have the early start of cataracts, vision is still very good  Your eyes are different and you have an astigmatism, so prescription reading prescription will be much clearer than over the counter   Astigmatism is a common type of refractive error. It is a condition in which the human eye does not focus light evenly onto the retina, the light-sensitive tissue at the back of the eye.  Astigmatism in the eye means that the cornea is oval like a football instead of spherical like a basketball. Most astigmatic corneas have two curves - a steeper curve and a flatter curve. This causes light to focus on more than one point in the eye, resulting in blurred vision at distance or near.

## 2020-08-14 NOTE — PROGRESS NOTES
Chief Complaint   Patient presents with     Diabetic Eye Exam     JOYCE: 2018 - used to go to [facility]  No correction.   Stable vision, no concerns.     Lab Results   Component Value Date    A1C 9.4 07/01/2020    A1C 8.2 08/05/2017    A1C 8.4 01/13/2017    A1C 7.6 09/15/2016    A1C 6.5 05/06/2016     X 30y   Last Eye Exam: 2018  Dilated Previously: Yes    What are you currently using to see?  Readers - just started using readers a couple weeks ago.     Distance Vision Acuity: Satisfied with vision    Near Vision Acuity: Satisfied with vision while reading and using computer with readers    Eye Comfort: good  Do you use eye drops? : No  Occupation or Hobbies:     Ludivina Benson CPO     Medical, surgical and family histories reviewed and updated 8/14/2020.       OBJECTIVE: See Ophthalmology exam    ASSESSMENT:    ICD-10-CM    1. Presbyopia  H52.4       PLAN:    Francois Fontana aware  eye exam results will be sent to No Ref-Primary, Physician.    Mica Askew OD

## 2020-08-28 ENCOUNTER — ALLIED HEALTH/NURSE VISIT (OUTPATIENT)
Dept: FAMILY MEDICINE | Facility: CLINIC | Age: 65
End: 2020-08-28
Payer: COMMERCIAL

## 2020-08-28 VITALS — SYSTOLIC BLOOD PRESSURE: 130 MMHG | DIASTOLIC BLOOD PRESSURE: 60 MMHG

## 2020-08-28 DIAGNOSIS — I10 ESSENTIAL HYPERTENSION WITH GOAL BLOOD PRESSURE LESS THAN 140/90: ICD-10-CM

## 2020-08-28 DIAGNOSIS — E78.5 HYPERLIPIDEMIA LDL GOAL <70: ICD-10-CM

## 2020-08-28 DIAGNOSIS — I10 HYPERTENSION GOAL BP (BLOOD PRESSURE) < 140/90: Primary | ICD-10-CM

## 2020-08-28 PROCEDURE — 99207 ZZC NO CHARGE NURSE ONLY: CPT

## 2020-08-28 RX ORDER — FLURBIPROFEN SODIUM 0.3 MG/ML
SOLUTION/ DROPS OPHTHALMIC
Qty: 100 EACH | Refills: 0 | Status: SHIPPED | OUTPATIENT
Start: 2020-08-28 | End: 2020-11-10

## 2020-08-28 NOTE — PROGRESS NOTES
First Bp check was 142/56, pt sat and BP went down to 130/60. Pt needed this encounter forwarded to PCP (Dr. 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 Will send , sent over diabetic needles to RN to fill and send to [facility] pharmacy. Pt will come back later to  RX  Tawanna Sweet CMA

## 2020-09-02 ENCOUNTER — MYC MEDICAL ADVICE (OUTPATIENT)
Dept: PHARMACY | Facility: CLINIC | Age: 65
End: 2020-09-02

## 2020-09-02 DIAGNOSIS — E11.65 TYPE 2 DIABETES MELLITUS WITH HYPERGLYCEMIA, WITHOUT LONG-TERM CURRENT USE OF INSULIN (H): Primary | ICD-10-CM

## 2020-09-02 DIAGNOSIS — I10 HYPERTENSION GOAL BP (BLOOD PRESSURE) < 140/90: ICD-10-CM

## 2020-09-02 NOTE — LETTER
[facility]  [address] [place], [address]  [phone]        October 27, 2020    Francois Fontana                                                                                                                                                       [address] [facility] [address]            Dear Francois,    Please call us at [phone] to schedule a lab only appointment.  We have orders for you to get your A1C and a Basic Metabolic Panel.         Sincerely,     [facility]

## 2020-10-21 NOTE — TELEPHONE ENCOUNTER
Patient due for BMP and A1c lab check since last medication changes. Please call him to schedule lab only.    Kiki Javed, PharmD  Medication Therapy Management Provider, [facility]  Pager: [phone]

## 2020-11-09 DIAGNOSIS — I10 ESSENTIAL HYPERTENSION WITH GOAL BLOOD PRESSURE LESS THAN 140/90: ICD-10-CM

## 2020-11-09 DIAGNOSIS — E78.5 HYPERLIPIDEMIA LDL GOAL <70: ICD-10-CM

## 2020-11-10 RX ORDER — FLURBIPROFEN SODIUM 0.3 MG/ML
SOLUTION/ DROPS OPHTHALMIC
Qty: 100 EACH | Refills: 0 | Status: SHIPPED | OUTPATIENT
Start: 2020-11-10 | End: 2021-02-24

## 2020-11-10 NOTE — TELEPHONE ENCOUNTER
Prescription approved per [facility] Refill Protocol.    Rosio Nieves RN

## 2020-11-16 ENCOUNTER — HEALTH MAINTENANCE LETTER (OUTPATIENT)
Age: 65
End: 2020-11-16

## 2021-02-22 DIAGNOSIS — I10 ESSENTIAL HYPERTENSION WITH GOAL BLOOD PRESSURE LESS THAN 140/90: ICD-10-CM

## 2021-02-22 DIAGNOSIS — E78.5 HYPERLIPIDEMIA LDL GOAL <70: ICD-10-CM

## 2021-02-23 ENCOUNTER — HOSPITAL ENCOUNTER (EMERGENCY)
Facility: CLINIC | Age: 66
Discharge: HOME | End: 2021-02-23
Attending: EMERGENCY MEDICINE | Admitting: EMERGENCY MEDICINE
Payer: OTHER MISCELLANEOUS

## 2021-02-23 ENCOUNTER — APPOINTMENT (OUTPATIENT)
Dept: CT IMAGING | Facility: CLINIC | Age: 66
End: 2021-02-23
Attending: EMERGENCY MEDICINE
Payer: OTHER MISCELLANEOUS

## 2021-02-23 ENCOUNTER — APPOINTMENT (OUTPATIENT)
Dept: GENERAL RADIOLOGY | Facility: CLINIC | Age: 66
End: 2021-02-23
Attending: EMERGENCY MEDICINE
Payer: OTHER MISCELLANEOUS

## 2021-02-23 VITALS
DIASTOLIC BLOOD PRESSURE: 89 MMHG | BODY MASS INDEX: 36.94 KG/M2 | HEART RATE: 95 BPM | SYSTOLIC BLOOD PRESSURE: 173 MMHG | OXYGEN SATURATION: 97 % | WEIGHT: 280 LBS | TEMPERATURE: 97.4 F | RESPIRATION RATE: 16 BRPM

## 2021-02-23 DIAGNOSIS — M25.512 ACUTE PAIN OF LEFT SHOULDER: ICD-10-CM

## 2021-02-23 DIAGNOSIS — S00.03XA CONTUSION OF SCALP, INITIAL ENCOUNTER: ICD-10-CM
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
## 2021-02-23 PROCEDURE — 99285 EMERGENCY DEPT VISIT HI MDM: CPT | Mod: 25

## 2021-02-23 PROCEDURE — 73030 X-RAY EXAM OF SHOULDER: CPT | Mod: LT

## 2021-02-23 PROCEDURE — 250N000013 HC RX MED GY IP 250 OP 250 PS 637: Performed by: EMERGENCY MEDICINE

## 2021-02-23 PROCEDURE — 70450 CT HEAD/BRAIN W/O DYE: CPT

## 2021-02-23 PROCEDURE — 250N000011 HC RX IP 250 OP 636: Performed by: EMERGENCY MEDICINE

## 2021-02-23 PROCEDURE — 96374 THER/PROPH/DIAG INJ IV PUSH: CPT

## 2021-02-23 RX ORDER — KETOROLAC TROMETHAMINE 15 MG/ML
15 INJECTION, SOLUTION INTRAMUSCULAR; INTRAVENOUS ONCE
Status: COMPLETED | OUTPATIENT
Start: 2021-02-23 | End: 2021-02-23

## 2021-02-23 RX ORDER — ACETAMINOPHEN 500 MG
1000 TABLET ORAL ONCE
Status: COMPLETED | OUTPATIENT
Start: 2021-02-23 | End: 2021-02-23

## 2021-02-23 RX ORDER — FLURBIPROFEN SODIUM 0.3 MG/ML
SOLUTION/ DROPS OPHTHALMIC
Qty: 100 EACH | Refills: 0 | OUTPATIENT
Start: 2021-02-23

## 2021-02-23 RX ADMIN — ACETAMINOPHEN 1000 MG: 500 TABLET, FILM COATED ORAL at 09:06

## 2021-02-23 RX ADMIN — KETOROLAC TROMETHAMINE 15 MG: 15 INJECTION, SOLUTION INTRAMUSCULAR; INTRAVENOUS at 09:54

## 2021-02-23 ASSESSMENT — ENCOUNTER SYMPTOMS
BACK PAIN: 0
NECK PAIN: 0
HEADACHES: 1

## 2021-02-23 NOTE — TELEPHONE ENCOUNTER
Routing refill request to provider for review/approval because:  Confirm refill and if needs to schedule with endo (former [facility] pt)  Louisa Barahona RN, BSN  Message handled by CLINIC NURSE.

## 2021-02-23 NOTE — ED TRIAGE NOTES
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 He reports he hit his head and hurt his left shoulder. He reports a headache. He takes baby aspirin daily, no other thinners. He has a contusion on the back of his scalp and difficulty lifting or moving his left arm. Denies loss of consciousness. Alert and oriented x 4.   "

## 2021-02-23 NOTE — ED PROVIDER NOTES
History   Chief Complaint:  Fall, Head Injury, and Shoulder Injury       The history is provided by the patient.      Francois Fontana is a 65 year old male on Aspirin with history of diabetes mellitus type 2, hyperlipidemia, and hypertension who presents with fall, shoulder,and head injury. Patient reports falling on ice this morning causing him to hit his head and injure his left shoulder. Denies loss of consciousness . States he has a slight headache and is having difficulty lifting or moving his left  Arm. Denies back and neck pain. Currently, he is not on any pain mediations. Mentions a similar episode happened before and he received 11 staples to his scalp.    Per [facility], Tetanus was administered on 3/13/2019.     Review of Systems   Constitutional:        Fall.    Musculoskeletal: Negative for back pain and neck pain.   Neurological: Positive for headaches. Negative for syncope.        Head injury.    All other systems reviewed and are negative.      Allergies:  No Known Allergies    Medications:  Norvasc   aspirin 81 MG   Vibramycin   Glipizide   Hydrochlorothiazide   Metformin  pioglitazone   simvastatin   valsartan     Past Medical History:    Diabetes mellitus type 2  Hyperlipidemia   Rosacea   Hypertension   Obstructive sleep apnea   Morbid obesity  Stenosis of lf subclavian artery     Past Surgical History:    Bilateral  incipient cataract    Hernia repair     Family History:    Father: cancer, coronary artery disease , Diabetes Mellitus Type 2    Mother: rosacea, COPD  Brother:  Gastrointestinal disease      Social History:  Employed as a .  Presents alone.    Physical Exam     Patient Vitals for the past 24 hrs:   BP Temp Temp src Pulse Resp SpO2 Weight   02/23/21 0900 (!) 173/89 -- -- 95 -- 97 % --   02/23/21 0842 (!) 168/74 97.4  F (36.3  C) Temporal 102 16 96 % 127 kg (280 lb)       Physical Exam    Nursing note and vitals reviewed.  General: Patient is alert and interactive when I enter  the room  Head:  Pt with a large occipital contusion with overlying abrasion. No laceration.   Eyes:  Conjunctivae are normal  ENT:    The nose is normal    Pinnae are normal  Neck:  Trachea midline  CV:  Normal rate  Resp:  No respiratory distress   Musc:  Normal muscular tone    Left shoulder pain with ROM, especially flexion. No pain to palpation.   Skin:  No rash or lesions noted  Neuro: Speech is normal and fluent. Face is symmetric. Moving all extremities well.   Psych:  Awake. Alert.  Normal affect.  Appropriate interactions.      Emergency Department Course     Imaging:  XR Shoulder Left G/E 3 Views  IMPRESSION: Negative exam.  Imaging independently reviewed and agree with radiologist interpretation.     Head CT w/o contrast  IMPRESSION:   1. No acute intracranial abnormality.  2. Left parietal scalp contusion.  Imaging independently reviewed and agree with radiologist interpretation.     Emergency Department Course:    Reviewed:  I reviewed nursing notes, vitals and past medical history    Assessments:  0845 I obtained history and examined the patient as noted above.   1032 I rechecked the patient and explained findings.     Interventions:  0906 Tylenol 1000 mg Oral   0954 Toradol 15mg IV    Disposition:  The patient was discharged to home.     Impression & Plan         Medical Decision Making:  Patient presents with scalp contusion and left shoulder pain.  This was following mechanical fall.  There was clearly no syncope.  Patient with a scalp contusion on exam.  Tetanus is up-to-date.  He also has left shoulder pain and is unable to lift his left arm above his head.  Imaging for both of these problems is negative for acute process.  Suspect concussion and head contusion.  Suspect a rotator cuff injury.  Patient placed in sling for comfort and instructed to remove arm at least 3 times a day and perform shoulder circles.  Discussed red flags to merit return to ED for repeat imaging.  Discussed concussion,  as well as very small risk for delayed bleed related to aspirin use. He is in stable condition at the time of discharge, indications for return to the ED were discussed as well as follow up. All questions were answered and he is in agreement with the plan.      Diagnosis:    ICD-10-CM    1. Acute pain of left shoulder  M25.512    2. Fall, initial encounter  W19.XXXA    3. Contusion of scalp, initial encounter  S00.03XA      Scribe Disclosure:  I, Penelope Bettencourt, am serving as a scribe at 8:46 AM on 2/23/2021 to document services personally performed by Aj Majano MD based on my observations and the provider's statements to me.        Aj Majano MD  02/23/21 2034

## 2021-02-23 NOTE — TELEPHONE ENCOUNTER
Unclear why patient needs insulin pen needles, does not appear to be on insulin.  Will have staff call and verify need.

## 2021-02-23 NOTE — LETTER
March 1, 2021      To Whom It May Concern:      Francois Fontana was seen in our Emergency Department today, 2/23/21.  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    Sincerely,        Negro Johnson MD

## 2021-02-23 NOTE — LETTER
March 1, 2021      To Whom It May Concern:      Francois Fontana was seen in our Emergency Department today, 2/23/21. He may return to work March 2, 2021.      Sincerely,        Negro Johnson MD

## 2021-02-24 RX ORDER — FLURBIPROFEN SODIUM 0.3 MG/ML
SOLUTION/ DROPS OPHTHALMIC
Qty: 100 EACH | Refills: 0 | Status: SHIPPED | OUTPATIENT
Start: 2021-02-24 | End: 2021-06-01

## 2021-02-24 NOTE — TELEPHONE ENCOUNTER
Pt needs pen needles for his Victoza    Thanks!  Jennifer Hazel, Pharmacy Tech  [facility]  [phone]

## 2021-03-01 ENCOUNTER — HOSPITAL ENCOUNTER (EMERGENCY)
Facility: CLINIC | Age: 66
End: 2021-03-01
Payer: COMMERCIAL

## 2021-03-16 ENCOUNTER — TRANSFERRED RECORDS (OUTPATIENT)
Dept: HEALTH INFORMATION MANAGEMENT | Facility: CLINIC | Age: 66
End: 2021-03-16

## 2021-04-02 DIAGNOSIS — L71.9 ROSACEA: ICD-10-CM

## 2021-04-04 ENCOUNTER — HEALTH MAINTENANCE LETTER (OUTPATIENT)
Age: 66
End: 2021-04-04

## 2021-04-06 DIAGNOSIS — E11.65 TYPE 2 DIABETES MELLITUS WITH HYPERGLYCEMIA, WITHOUT LONG-TERM CURRENT USE OF INSULIN (H): ICD-10-CM

## 2021-04-06 DIAGNOSIS — L71.9 ROSACEA: ICD-10-CM

## 2021-04-06 RX ORDER — DOXYCYCLINE 100 MG/1
100 CAPSULE ORAL 2 TIMES DAILY
Qty: 180 CAPSULE | Refills: 1 | Status: SHIPPED | OUTPATIENT
Start: 2021-04-06 | End: 2021-04-08

## 2021-04-07 RX ORDER — DOXYCYCLINE HYCLATE 100 MG
TABLET ORAL
Qty: 180 TABLET | Refills: 1 | OUTPATIENT
Start: 2021-04-07

## 2021-04-07 RX ORDER — LIRAGLUTIDE 6 MG/ML
INJECTION SUBCUTANEOUS
Refills: 5 | OUTPATIENT
Start: 2021-04-07

## 2021-04-07 NOTE — TELEPHONE ENCOUNTER
Has appointment tomorrow, routed FYI to [facility], please review at visit  Louisa Barahona RN, BSN  Message handled by CLINIC NURSE.

## 2021-04-08 ENCOUNTER — OFFICE VISIT (OUTPATIENT)
Dept: FAMILY MEDICINE | Facility: CLINIC | Age: 66
End: 2021-04-08
Payer: OTHER MISCELLANEOUS

## 2021-04-08 VITALS
DIASTOLIC BLOOD PRESSURE: 70 MMHG | RESPIRATION RATE: 16 BRPM | HEART RATE: 94 BPM | OXYGEN SATURATION: 96 % | SYSTOLIC BLOOD PRESSURE: 144 MMHG | WEIGHT: 281 LBS | BODY MASS INDEX: 38.06 KG/M2 | HEIGHT: 72 IN

## 2021-04-08 DIAGNOSIS — R60.9 EDEMA, UNSPECIFIED TYPE: ICD-10-CM

## 2021-04-08 DIAGNOSIS — Z01.818 PRE-OP EXAM: Primary | ICD-10-CM

## 2021-04-08 DIAGNOSIS — M75.122 COMPLETE TEAR OF LEFT ROTATOR CUFF, UNSPECIFIED WHETHER TRAUMATIC: ICD-10-CM

## 2021-04-08 DIAGNOSIS — L71.9 ROSACEA: ICD-10-CM

## 2021-04-08 DIAGNOSIS — E11.65 TYPE 2 DIABETES MELLITUS WITH HYPERGLYCEMIA, WITHOUT LONG-TERM CURRENT USE OF INSULIN (H): ICD-10-CM

## 2021-04-08 LAB
ERYTHROCYTE [DISTWIDTH] IN BLOOD BY AUTOMATED COUNT: 13.3 % (ref 10–15)
HBA1C MFR BLD: 13.1 % (ref 0–5.6)
HCT VFR BLD AUTO: 38.8 % (ref 40–53)
HGB BLD-MCNC: 13.3 G/DL (ref 13.3–17.7)
MCH RBC QN AUTO: 31.7 PG (ref 26.5–33)
MCHC RBC AUTO-ENTMCNC: 34.3 G/DL (ref 31.5–36.5)
MCV RBC AUTO: 93 FL (ref 78–100)
PLATELET # BLD AUTO: 277 10E9/L (ref 150–450)
RBC # BLD AUTO: 4.19 10E12/L (ref 4.4–5.9)
WBC # BLD AUTO: 8.8 10E9/L (ref 4–11)

## 2021-04-08 PROCEDURE — 85027 COMPLETE CBC AUTOMATED: CPT | Performed by: FAMILY MEDICINE

## 2021-04-08 PROCEDURE — 83880 ASSAY OF NATRIURETIC PEPTIDE: CPT | Performed by: FAMILY MEDICINE

## 2021-04-08 PROCEDURE — 83036 HEMOGLOBIN GLYCOSYLATED A1C: CPT | Performed by: FAMILY MEDICINE

## 2021-04-08 PROCEDURE — 93000 ELECTROCARDIOGRAM COMPLETE: CPT | Performed by: FAMILY MEDICINE

## 2021-04-08 PROCEDURE — 84443 ASSAY THYROID STIM HORMONE: CPT | Performed by: FAMILY MEDICINE

## 2021-04-08 PROCEDURE — 36415 COLL VENOUS BLD VENIPUNCTURE: CPT | Performed by: FAMILY MEDICINE

## 2021-04-08 PROCEDURE — 80048 BASIC METABOLIC PNL TOTAL CA: CPT | Performed by: FAMILY MEDICINE

## 2021-04-08 PROCEDURE — 99215 OFFICE O/P EST HI 40 MIN: CPT | Performed by: FAMILY MEDICINE

## 2021-04-08 RX ORDER — DOXYCYCLINE 100 MG/1
100 CAPSULE ORAL 2 TIMES DAILY
Qty: 180 CAPSULE | Refills: 1 | Status: SHIPPED | OUTPATIENT
Start: 2021-04-08 | End: 2021-09-07

## 2021-04-08 RX ORDER — LIRAGLUTIDE 6 MG/ML
INJECTION SUBCUTANEOUS
Qty: 9 ML | Refills: 5 | Status: SHIPPED | OUTPATIENT
Start: 2021-04-08 | End: 2021-09-27

## 2021-04-08 ASSESSMENT — MIFFLIN-ST. JEOR: SCORE: 2102.61

## 2021-04-08 NOTE — PATIENT INSTRUCTIONS
Do NOT take Hydrochlorothiazide, Metformin, Pioglitazone, Glipizide, or Victoza the morning of surgery!    You may take your Valsartan, Amlodipine, Simvastatin, and doxycycline.    No Ibuprofen, Aleve, Naproxen, Advil, or Asprin for pain.  Only Tylenol (Acetaminophen) can be use.      Preparing for Your Surgery  Getting started  A nurse will call you to review your health history and instructions. They will give you an arrival time based on your scheduled surgery time.  Please be ready to share the following:    Your doctor's clinic name and phone number    Your medical, surgical and anesthesia history    A list of allergies and sensitivities    A list of medicines, including herbal treatments and over-the-counter drugs    Whether the patient has a legal guardian (ask how to send us the papers in advance)  If you have a child who's having surgery, please ask for a copy of Preparing for Your Child's Surgery.    Preparing for surgery    Within 30 days of surgery: Have a pre-op exam (sometimes called an H&P, or History and Physical). This can be done at a clinic or pre-operative center.  ? If you're having a , you may not need this exam. Talk to your care team    At your pre-op exam, talk to your care team about all medicines you take. If you need to stop any medicines before surgery, ask when to start taking them again.  ? We do this for your safety. Many medicines can make you bleed too much during surgery. Some change how well surgery (anesthesia) drugs work.    Call your insurance company to let them know you're having surgery. (If you don't have insurance, call [phone].)    Call your clinic if there's any change in your health. This includes signs of a cold or flu (sore throat, runny nose, cough, rash, fever). It also includes a scrape or scratch near the surgery site.    If you have questions on the day of surgery, call your hospital or surgery center.  Eating and drinking guidelines  For your  safety: Unless your surgeon tells you otherwise, follow the guidelines below.    Eat and drink as usual until 8 hours before surgery. After that, no food or milk.    Drink clear liquids until 2 hours before surgery. These are liquids you can see through, like water, Gatorade and Propel Water. You may also have black coffee and tea (no cream or milk).    Nothing by mouth within 2 hours of surgery. This includes gum, candy and breath mints.    If you drink, stop drinking alcohol the night before surgery.    If your care team tells you to take medicine on the morning of surgery, it's okay to take it with a sip of water.  Preventing infection    Shower or bathe the night before and morning of your surgery. Follow the instructions your clinic gave you. (If no instructions, use regular soap.)    Don't shave or clip hair near your surgery site. We'll remove the hair if needed.    Don't smoke or vape the morning of surgery. You may chew nicotine gum up to 2 hours before surgery. A nicotine patch is okay.  ? Note: Some surgeries require you to completely quit smoking and nicotine. Check with your surgeon.    Your care team will make every effort to keep you safe from infection. We will:  ? Clean our hands often with soap and water (or an alcohol-based hand rub).  ? Clean the skin at your surgery site with a special soap that kills germs.  ? Give you a special gown to keep you warm. (Cold raises the risk of infection.)  ? Wear special hair covers, masks, gowns and gloves during surgery.  ? Give antibiotic medicine, if prescribed. Not all surgeries need antibiotics.  What to bring on the day of surgery    Photo ID and insurance card    Copy of your health care directive, if you have one    Glasses and hearing aides (bring cases)  ? You can't wear contacts during surgery    Inhaler and eye drops, if you use them (tell us about these when you arrive)    CPAP machine or breathing device, if you use them    A few personal items,  if spending the night    If you have . . .  ? A pacemaker or ICD (cardiac defibrillator): Bring the ID card.  ? An implanted stimulator: Bring the remote control.  ? A legal guardian: Bring a copy of the certified (court-stamped) guardianship papers.  Please remove any jewelry, including body piercings. Leave jewelry and other valuables at home.  If you're going home the day of surgery  Important: If you don't follow the rules below, we must cancel your surgery.     Arrange for someone to drive you home after surgery. You may not drive, take a taxi or take public transportation by yourself (unless you'll have local anesthesia only).    Arrange for a responsible adult to stay with you overnight. If you don't, we may keep you in the hospital overnight, and you may need to pay the costs yourself.  Questions?   If you have any questions for your care team, list them here: _________________________________________________________________________________________________________________________________________________________________________________________________________________________________________________________________________________________________________________________  For informational purposes only. Not to replace the advice of your health care provider. Copyright   2003, 2019 [employer] [employer]. All rights reserved. Clinically reviewed by Gia Estrada MD. [employer] [id number] - REV 4/20.

## 2021-04-08 NOTE — PROGRESS NOTES
[facility]  [id number] [place] [address]  Phone: [phone]  Primary Provider: Misael Figueredo  Pre-op Performing Provider: MISAEL FIGUEREDO      PREOPERATIVE EVALUATION:  Today's date: 4/8/2021    Francios Fontana is a 65 year old male who presents for a preoperative evaluation.    Surgical Information:  Surgery/Procedure: Left shoulder scope, biceps tenotomy, rotator cuff repair, and distal clavicle excision  Surgery Location: [facility]  Surgeon: Dr. Claudio Askew  Surgery Date: 04/14/2021  Time of Surgery: TBD  Where patient plans to recover: At home with family  Fax number for surgical facility: [phone]     1. No - Have you ever had a heart attack or stroke?  2. No - Have you ever had surgery on your heart or blood vessels, such as a stent, coronary (heart) bypass, or surgery on an artery in the head, neck, heart, or legs?  3. No - Do you have chest pain when you are physically active?  4. No - Do you have a history of heart failure?  5. No - Do you currently have a cold, bronchitis, or symptoms of other respiratory (head and chest) infections?  6. No - Do you have a cough, shortness of breath, or wheezing?  7. No - Do you or anyone in your family have a history of blood clots?  8. No - Do you or anyone in your family have a serious bleeding problem, such as long-lasting bleeding after surgeries or cuts?  9. No - Have you ever had anemia or been told to take iron pills?  10. No - Have you had any abnormal blood loss such as black, tarry or bloody stools, or abnormal vaginal bleeding?  11. No - Have you ever had a blood transfusion?  12. Yes - Are you willing to have a blood transfusion if it is medically needed before, during, or after your surgery?  13. No - Have you or anyone in your family ever had problems with anesthesia (sedation for surgery)?  14. yes - Do you have sleep apnea, excessive snoring, or daytime  drowsiness? Sleep apnea  15. No - Do you have any artifical heart valves or other implanted medical devices, such as a pacemaker, defibrillator, or continuous glucose monitor?  16. No - Do you have any artifical joints?  17. No - Are you allergic to latex?        Type of Anesthesia Anticipated: General    Assessment & Plan     The proposed surgical procedure is considered INTERMEDIATE risk.    1.  Pre-op exam  - EKG 12-lead complete w/read - Clinics  - CBC with platelets  - Basic metabolic panel  (Ca, Cl, CO2, Creat, Gluc, K, Na, BUN)  2.  Complete tear of left rotator cuff, unspecified whether traumatic  3.  Type 2 diabetes mellitus with hyperglycemia, without long-term current use of insulin (H)  Due for labs, need before surgery.  Refill medication.  - Hemoglobin A1c  - TSH with free T4 reflex  - VICTOZA PEN 18 MG/3ML soln; INJECT 1.8 MG SUBCUTANEOUSLY ONCE DAILY  4.  Rosacea  Refill medication  - doxycycline hyclate (VIBRAMYCIN) 100 MG capsule; Take 1 capsule (100 mg) by mouth 2 times daily  5.  Edema, unspecified type  Given diabetes and hypertension history, will check BNP  - BNP-N terminal pro    Risks and Recommendations:  The patient has the following additional risks and recommendations for perioperative complications:  Diabetes:  - Patient is not on insulin therapy: regular NPO guidelines can be followed.     Medication Instructions:   - ACE/ARB: May be continued on the day of surgery.    - Calcium Channel Blockers: May be continued on the day of surgery.   - Diuretics: HOLD on the day of surgery.   - Statins: Continue taking on the day of surgery.    - metformin: HOLD day of surgery.   - sulfonylurea (e.g. glyburide, glipizide): HOLD day of surgery   - Thiazolidinedione (e.g. rosiglitazone, pioglitazone): HOLD day of surgery.   - GLP-1 Injectable (exenitide, liraglutide, semaglutide, dulaglutide, etc.): HOLD day of surgery     RECOMMENDATION:  Surgery is NOT recommended due to uncontrolled diabetes.  Stabilization required prior to elective surgery.       40 minutes spent on the date of the encounter doing chart review, history and exam, documentation and further activities per the note      Subjective     HPI related to upcoming procedure: Chronic left shoulder pain, torn rotator cuff      No flowsheet data found.    Health Care Directive:  Patient does not have a Health Care Directive or Living Will: Discussed advance care planning with patient; however, patient declined at this time.    Preoperative Review of :   reviewed - controlled substances reflected in medication list.      Status of Chronic Conditions:  See problem list for active medical problems.  Problems all longstanding and stable, except as noted/documented.  See ROS for pertinent symptoms related to these conditions.    DIABETES - Patient has a longstanding history of DiabetesType Type II . Patient is being treated with diet and oral agents and denies significant side effects. Control has been good. Complicating factors include but are not limited to: hypertension, hyperlipidemia and morbid obesity .     HYPERLIPIDEMIA - Patient has a long history of significant Hyperlipidemia requiring medication for treatment with recent good control. Patient reports no problems or side effects with the medication.     HYPERTENSION - Patient has longstanding history of HTN , currently denies any symptoms referable to elevated blood pressure. Specifically denies chest pain, palpitations, dyspnea, orthopnea, PND or peripheral edema. Blood pressure readings have been in normal range per patient report. Current medication regimen is as listed below. Patient denies any side effects of medication.       Review of Systems  CONSTITUTIONAL: NEGATIVE for fever, chills, change in weight  INTEGUMENTARY/SKIN: NEGATIVE for worrisome rashes, moles or lesions. + large bruise on left hand  EYES: NEGATIVE for vision changes or irritation  ENT/MOUTH: NEGATIVE for ear,  mouth and throat problems  RESP: NEGATIVE for significant cough or SOB  CV: NEGATIVE for chest pain, palpitations or peripheral edema. + edema  GI: NEGATIVE for nausea, abdominal pain, heartburn, or change in bowel habits  : NEGATIVE for frequency, dysuria, or hematuria  MUSCULOSKELETAL: NEGATIVE for significant arthralgias or myalgia. + chronic left shoulder  NEURO: NEGATIVE for weakness, dizziness or paresthesias  ENDOCRINE: NEGATIVE for temperature intolerance, skin/hair changes  HEME: NEGATIVE for bleeding problems  PSYCHIATRIC: NEGATIVE for changes in mood or affect    Patient Active Problem List    Diagnosis Date Noted     Hypertension goal BP (blood pressure) < 140/90 02/14/2003     Priority: High     Type 2 diabetes mellitus with hyperglycemia; A1c goal <7 07/22/2002     Priority: High     Dx 1990       Bilateral incipient cataracts 08/14/2020     Priority: Medium     Type 2 diabetes mellitus with diabetic nephropathy (H) 11/20/2015     Priority: Medium     JUSTIN (obstructive sleep apnea) 09/22/2015     Priority: Medium     Family history of prostate cancer in father  10/05/2012     Priority: Medium     Morbid obesity due to excess calories (H) 10/05/2012     Priority: Medium     Advanced directives, counseling/discussion 08/25/2011     Priority: Medium     Advance Directive Problem List Overview:   Name Relationship Phone    Primary Health Care Agent            Alternative Health Care Agent          Discussed advance care planning with patient; information given to patient to review. 8/25/2011     Follow up to [facility] referral call placed. Patient would like to review document provided at visit, establish an agent and then will schedule facilitation if needed. Key Obando LPN/Advanced Healthcare Planning Facilitator   9/9/11           Stenosis of left subclavian artery (H) 11/30/2010     Priority: Medium     Dr Gonzales consult - 2010 -recommended: reconsult if any symptoms develop        Hyperlipidemia LDL goal <70 10/31/2010     Priority: Medium     Rosacea 07/13/2004     Priority: Medium        Past Medical History:   Diagnosis Date     Diabetes mellitus, type 2 (H)      Hyperlipidemia      Rosacea      Stenosis of left subclavian artery (H)      Type II or unspecified type diabetes mellitus without mention of complication, not stated as uncontrolled     Abstracted 07/18/02     Unspecified essential hypertension        Past Surgical History:   Procedure Laterality Date     HERNIA REPAIR, INGUINAL RT/LT  1993    right       Current Outpatient Medications   Medication Sig Dispense Refill     amLODIPine (NORVASC) 10 MG tablet TAKE ONE TABLET BY MOUTH ONCE DAILY 90 tablet 0     doxycycline hyclate (VIBRAMYCIN) 100 MG capsule Take 1 capsule (100 mg) by mouth 2 times daily 180 capsule 1     glipiZIDE (GLUCOTROL XL) 10 MG 24 hr tablet TAKE TWO TABLETS BY MOUTH ONCE DAILY 180 tablet 0     hydrochlorothiazide (HYDRODIURIL) 25 MG tablet TAKE ONE TABLET BY MOUTH ONCE DAILY 90 tablet 0     metFORMIN (GLUCOPHAGE) 1000 MG tablet TAKE ONE TABLET BY MOUTH TWICE A DAY WITH MEALS 180 tablet 0     pioglitazone (ACTOS) 45 MG tablet TAKE ONE TABLET BY MOUTH ONCE DAILY 90 tablet 0     simvastatin (ZOCOR) 20 MG tablet TAKE ONE TABLET BY MOUTH EVERY NIGHT AT BEDTIME 90 tablet 0     valsartan (DIOVAN) 160 MG tablet TAKE ONE TABLET BY MOUTH ONCE DAILY 90 tablet 0     VICTOZA PEN 18 MG/3ML soln INJECT 1.8 MG SUBCUTANEOUSLY ONCE DAILY 9 mL 5     aspirin 81 MG EC tablet Take 81 mg by mouth daily       blood glucose monitoring (ACCU-CHEK SMARTVIEW) test strip Use to test blood sugar 3 times daily or as directed. 300 each 3     insulin pen needle (B-D U/F) 31G X 5 MM miscellaneous Use one pen needle daily or as directed. 100 each 0     metroNIDAZOLE (METROGEL) 1 % gel Apply topically daily 60 g 11     order for DME Patient Francois ALVARENGA Addi was set up at [facility] on September 23, 2015. Patient received a [employer]  DreamStation Auto CPAP Auto. Pressures were set at Auto 6 - 12 cm H2O.   Patient s ramp is 5 cm H2O for 30 min and FLEX/EPR is A Flex.  Patient received a [employer] Mask name: Wisp  Nasal mask Size Small, Medium, Large, heated tubing and heated humidifier.  Patient is enrolled in the STM Program and does need to meet compliance. Patient has a follow up on 11/4/15 with Bennett Goltz, PA.     Myra Lim (entered by Nitin Wilson)         No Known Allergies     Social History     Tobacco Use     Smoking status: Former Smoker     Quit date: 1985     Years since quittin.7     Smokeless tobacco: Never Used     Tobacco comment: quit    Substance Use Topics     Alcohol use: No     Alcohol/week: 0.0 standard drinks     Comment: quit drinking age 30     Family history reviewed, noncontributory  History   Drug Use No         Objective     BP (!) 144/70 (BP Location: Right arm, Patient Position: Sitting, Cuff Size: Adult Regular)   Pulse 94   Resp 16   Ht 1.829 m (6')   Wt 127.5 kg (281 lb)   SpO2 96%   BMI 38.11 kg/m      Physical Exam    GENERAL APPEARANCE: healthy, alert and no distress     EYES: EOMI,  PERRL     HENT: ear canals and TM's normal and nose and mouth without ulcers or lesions     NECK: no adenopathy, no asymmetry, masses, or scars and thyroid normal to palpation     RESP: lungs clear to auscultation - no rales, rhonchi or wheezes     CV: regular rates and rhythm. 3+ Bilateral pitting edema to mid shins     ABDOMEN:  soft, nontender, no HSM or masses and bowel sounds normal     MS: extremities normal- no gross deformities noted, no evidence of inflammation in joints     SKIN: no suspicious lesions or rashes, large ecchymosis on dorsal left hand     NEURO: Normal strength and tone, sensory exam grossly normal, mentation intact and speech normal     PSYCH: mentation appears normal. and affect normal/bright     LYMPHATICS: No cervical adenopathy    Recent Labs   Lab Test  07/22/20  0825 07/01/20  0942    136   POTASSIUM 4.5 4.5   CR 1.08 0.89   A1C  --  9.4*        Diagnostics:  Labs pending at this time.  Results will be reviewed when available.   EKG: Within normal limits    Revised Cardiac Risk Index (RCRI):  The patient has the following serious cardiovascular risks for perioperative complications:   - No serious cardiac risks = 0 points     RCRI Interpretation: 1 point: Class II (low risk - 0.9% complication rate)           Signed Electronically by: Jagruti Montoya MD  Copy of this evaluation report is provided to requesting physician.

## 2021-04-09 ENCOUNTER — IMMUNIZATION (OUTPATIENT)
Dept: NURSING | Facility: CLINIC | Age: 66
End: 2021-04-09
Payer: OTHER MISCELLANEOUS

## 2021-04-09 ENCOUNTER — TELEPHONE (OUTPATIENT)
Dept: FAMILY MEDICINE | Facility: CLINIC | Age: 66
End: 2021-04-09

## 2021-04-09 LAB
ANION GAP SERPL CALCULATED.3IONS-SCNC: 3 MMOL/L (ref 3–14)
BUN SERPL-MCNC: 33 MG/DL (ref 7–30)
CALCIUM SERPL-MCNC: 9.1 MG/DL (ref 8.5–10.1)
CHLORIDE SERPL-SCNC: 105 MMOL/L (ref 94–109)
CO2 SERPL-SCNC: 26 MMOL/L (ref 20–32)
CREAT SERPL-MCNC: 1.07 MG/DL (ref 0.66–1.25)
GFR SERPL CREATININE-BSD FRML MDRD: 73 ML/MIN/{1.73_M2}
GLUCOSE SERPL-MCNC: 267 MG/DL (ref 70–99)
NT-PROBNP SERPL-MCNC: 32 PG/ML (ref 0–125)
POTASSIUM SERPL-SCNC: 4.8 MMOL/L (ref 3.4–5.3)
SODIUM SERPL-SCNC: 134 MMOL/L (ref 133–144)
TSH SERPL DL<=0.005 MIU/L-ACNC: 1.51 MU/L (ref 0.4–4)

## 2021-04-09 PROCEDURE — 0001A PR COVID VAC PFIZER DIL RECON 30 MCG/0.3 ML IM: CPT

## 2021-04-09 PROCEDURE — 91300 PR COVID VAC PFIZER DIL RECON 30 MCG/0.3 ML IM: CPT

## 2021-04-09 NOTE — TELEPHONE ENCOUNTER
Per Dr. Danna Montoya.   Preop faxed to surgeon's office for shoulder surgery on 4-14-21.   Patient informed A1c was 13.1 so Dr. Danna Montoya does not recommend surgery. Informed and agrees to call surgeon's office to review - surgeon will decide whether surgery is on or postponed     We scheduled OV with [facility] for diabetes 4/19/21. MTM 4/23/21 - we'll review at [facility] visit whether keep or postpone 4/23 MTM.    PAL inside line provided.   GURINDER La - Patient Advocate and Liaison (PAL) [phone]

## 2021-04-16 ENCOUNTER — TELEPHONE (OUTPATIENT)
Dept: FAMILY MEDICINE | Facility: CLINIC | Age: 66
End: 2021-04-16

## 2021-04-16 NOTE — TELEPHONE ENCOUNTER
Pt calls, wants glucometer ordered, previous LS pt, has appointment Monday with [facility], recommend discuss/confirm testing etc, if [facility] taking over or referred to endo, pt will discuss with [facility] Monday, FYI sent  Louisa Barahona RN, BSN  Message handled by CLINIC NURSE.

## 2021-04-19 ENCOUNTER — OFFICE VISIT (OUTPATIENT)
Dept: FAMILY MEDICINE | Facility: CLINIC | Age: 66
End: 2021-04-19
Payer: COMMERCIAL

## 2021-04-19 VITALS
WEIGHT: 281 LBS | TEMPERATURE: 97.9 F | HEART RATE: 103 BPM | SYSTOLIC BLOOD PRESSURE: 146 MMHG | OXYGEN SATURATION: 97 % | DIASTOLIC BLOOD PRESSURE: 82 MMHG | BODY MASS INDEX: 38.11 KG/M2

## 2021-04-19 DIAGNOSIS — E66.01 MORBID OBESITY DUE TO EXCESS CALORIES (H): ICD-10-CM

## 2021-04-19 DIAGNOSIS — Z11.59 ENCOUNTER FOR HCV SCREENING TEST FOR LOW RISK PATIENT: ICD-10-CM

## 2021-04-19 DIAGNOSIS — I10 ESSENTIAL HYPERTENSION WITH GOAL BLOOD PRESSURE LESS THAN 140/90: ICD-10-CM

## 2021-04-19 DIAGNOSIS — Z12.11 COLON CANCER SCREENING: ICD-10-CM

## 2021-04-19 DIAGNOSIS — Z12.5 SCREENING FOR PROSTATE CANCER: ICD-10-CM

## 2021-04-19 DIAGNOSIS — Z11.4 SCREENING FOR HUMAN IMMUNODEFICIENCY VIRUS WITHOUT PRESENCE OF RISK FACTORS: ICD-10-CM

## 2021-04-19 DIAGNOSIS — E11.65 TYPE 2 DIABETES MELLITUS WITH HYPERGLYCEMIA, WITHOUT LONG-TERM CURRENT USE OF INSULIN (H): Primary | ICD-10-CM

## 2021-04-19 PROCEDURE — 99207 PR FOOT EXAM NO CHARGE: CPT | Mod: 25 | Performed by: FAMILY MEDICINE

## 2021-04-19 PROCEDURE — 99214 OFFICE O/P EST MOD 30 MIN: CPT | Performed by: FAMILY MEDICINE

## 2021-04-19 RX ORDER — BLOOD SUGAR DIAGNOSTIC
STRIP MISCELLANEOUS
Qty: 300 STRIP | Refills: 3 | Status: SHIPPED | OUTPATIENT
Start: 2021-04-19 | End: 2022-04-06

## 2021-04-19 RX ORDER — BLOOD SUGAR DIAGNOSTIC
STRIP MISCELLANEOUS
Status: CANCELLED | OUTPATIENT
Start: 2021-04-19

## 2021-04-19 NOTE — PROGRESS NOTES
"    Assessment & Plan     Type 2 diabetes mellitus with hyperglycemia, without long-term current use of insulin (H)  Placing Endocrinology referral.  MTM appointment on Friday.  Patient was hesitant to make any changes to his medications, believes that he has become \"intolerant\" to them because of his weight being so high.  He is a  and does not want to use insulin.  Will wait until MTM or Endo sees him.  Follow up in 3 months or sooner as needed.  Recommended he get back into testing his sugars, as well as dietary changes.  Increase exercise as weather improves.  - ENDOCRINOLOGY ADULT REFERRAL  - FOOT EXAM    Essential hypertension with goal blood pressure less than 140/90  Elevated today, already on several BP meds.  Will see MTM on Friday.  - blood glucose (ACCU-CHEK SMARTVIEW) test strip; Use to test blood sugar 3 times daily or as directed.    Morbid obesity due to excess calories (H)  Discussed diet and exercise.    Colon cancer screening  - Fecal colorectal cancer screen (FIT); Future    Screening for human immunodeficiency virus without presence of risk factors  - **HIV Antigen Antibody Combo FUTURE anytime; Future    Encounter for HCV screening test for low risk patient  - **Hepatitis C Screen Reflex to RNA FUTURE anytime; Future    Screening for prostate cancer  - **Prostate spec antigen screen FUTURE anytime; Future    19 minutes spent on the date of the encounter doing chart review, history and exam, documentation and further activities per the note     BMI:   Estimated body mass index is 38.11 kg/m  as calculated from the following:    Height as of 4/8/21: 1.829 m (6').    Weight as of this encounter: 127.5 kg (281 lb).   Weight management plan: Patient referred to endocrine and/or weight management specialty Discussed healthy diet and exercise guidelines    See Patient Instructions    No follow-ups on file.    Jagruti Montoya MD  [facility]    Subjective "   Eric is a 65 year old who presents for the following health issues     HPI     Diabetes Follow-up      How often are you checking your blood sugar? Not at all    What concerns do you have today about your diabetes? Other: a1c down      Do you have any of these symptoms? (Select all that apply)  No numbness or tingling in feet.  No redness, sores or blisters on feet.  No complaints of excessive thirst.  No reports of blurry vision.  No significant changes to weight.     Pt here today to follow up on his recent A1c.  States he has been taking all his medications.  Notes that the foods he is eating is the cause of his elevated sugars, has not been watching his diet at all.  He says he knows he needs to lose weight.  Has not been able to get out and walk as much, starting to increase his walking now that weather is improving.  Was previously working with Endocrinology and MTM.    Reports that he is taking Glipizide one tablet BID instead of 2 tablets once daily.      BP Readings from Last 2 Encounters:   04/19/21 (!) 146/82   04/08/21 (!) 144/70     Hemoglobin A1C (%)   Date Value   04/08/2021 13.1 (H)   07/01/2020 9.4 (H)     LDL Cholesterol Calculated (mg/dL)   Date Value   07/22/2020 32   01/13/2017 28         Review of Systems   Constitutional, HEENT, cardiovascular, pulmonary, gi and gu systems are negative, except as otherwise noted.      Objective    BP (!) 146/82   Pulse 103   Temp 97.9  F (36.6  C) (Oral)   Wt 127.5 kg (281 lb)   SpO2 97%   BMI 38.11 kg/m    Body mass index is 38.11 kg/m .  Physical Exam   GENERAL: healthy, alert and no distress  EYES: Eyes grossly normal to inspection, PERRL and conjunctivae and sclerae normal  RESP: lungs clear to auscultation - no rales, rhonchi or wheezes  CV: regular rate and rhythm, normal S1 S2, no S3 or S4, no murmur, click or rub, no peripheral edema and peripheral pulses strong  MS: no gross musculoskeletal defects noted, no edema.  SKIN: no suspicious lesions  or rashes.  Left foot has large callous on medial great toe, slight callous formation to bilateral heels.  No open sores, cracks, or ulcerations.  PSYCH: mentation appears normal, affect normal/bright    Office Visit on 04/08/2021   Component Date Value Ref Range Status     WBC 04/08/2021 8.8  4.0 - 11.0 10e9/L Final     RBC Count 04/08/2021 4.19* 4.4 - 5.9 10e12/L Final    Results confirmed by repeat test     Hemoglobin 04/08/2021 13.3  13.3 - 17.7 g/dL Final     Hematocrit 04/08/2021 38.8* 40.0 - 53.0 % Final    Results confirmed by repeat test     MCV 04/08/2021 93  78 - 100 fl Final     MCH 04/08/2021 31.7  26.5 - 33.0 pg Final     MCHC 04/08/2021 34.3  31.5 - 36.5 g/dL Final     RDW 04/08/2021 13.3  10.0 - 15.0 % Final     Platelet Count 04/08/2021 277  150 - 450 10e9/L Final     Sodium 04/08/2021 134  133 - 144 mmol/L Final     Potassium 04/08/2021 4.8  3.4 - 5.3 mmol/L Final     Chloride 04/08/2021 105  94 - 109 mmol/L Final     Carbon Dioxide 04/08/2021 26  20 - 32 mmol/L Final     Anion Gap 04/08/2021 3  3 - 14 mmol/L Final     Glucose 04/08/2021 267* 70 - 99 mg/dL Final     Urea Nitrogen 04/08/2021 33* 7 - 30 mg/dL Final     Creatinine 04/08/2021 1.07  0.66 - 1.25 mg/dL Final     GFR Estimate 04/08/2021 73  >60 mL/min/[1.73_m2] Final    Comment: Non  GFR Calc  Starting 12/18/2018, serum creatinine based estimated GFR (eGFR) will be   calculated using the Chronic Kidney Disease Epidemiology Collaboration   (CKD-EPI) equation.       GFR Estimate If Black 04/08/2021 84  >60 mL/min/[1.73_m2] Final    Comment:  GFR Calc  Starting 12/18/2018, serum creatinine based estimated GFR (eGFR) will be   calculated using the Chronic Kidney Disease Epidemiology Collaboration   (CKD-EPI) equation.       Calcium 04/08/2021 9.1  8.5 - 10.1 mg/dL Final     Hemoglobin A1C 04/08/2021 13.1* 0 - 5.6 % Final    Comment: Results confirmed by repeat test  Normal <5.7% Prediabetes 5.7-6.4%  Diabetes  6.5% or higher - adopted from ADA   consensus guidelines.       TSH 04/08/2021 1.51  0.40 - 4.00 mU/L Final     N-Terminal Pro Bnp 04/08/2021 32  0 - 125 pg/mL Final    Comment:    Reference range shown and results flagged as abnormal are for the outpatient,   non acute settings. Establishing a baseline value for each individual patient   is useful for follow-up.  Suggested inpatient cut points for confirming diagnosis of CHF in an acute   setting are:   >450 pg/mL (age 18 to less than 50)   >900 pg/mL (age 50 to less than 75)   >1800 pg/mL (75 yrs and older)  An inpatient or emergency department NT-proPBNP <300 pg/mL effectively rules   out acute CHF, with 99% negative predictive value.

## 2021-04-23 ENCOUNTER — TELEPHONE (OUTPATIENT)
Dept: FAMILY MEDICINE | Facility: CLINIC | Age: 66
End: 2021-04-23

## 2021-04-23 ENCOUNTER — PATIENT OUTREACH (OUTPATIENT)
Dept: FAMILY MEDICINE | Facility: CLINIC | Age: 66
End: 2021-04-23

## 2021-04-23 DIAGNOSIS — E11.65 TYPE 2 DIABETES MELLITUS WITH HYPERGLYCEMIA, WITHOUT LONG-TERM CURRENT USE OF INSULIN (H): Primary | ICD-10-CM

## 2021-04-23 RX ORDER — LANCETS
EACH MISCELLANEOUS
Qty: 200 EACH | Refills: 3 | Status: SHIPPED | OUTPATIENT
Start: 2021-04-23 | End: 2023-09-19

## 2021-04-23 RX ORDER — GLUCOSAMINE HCL/CHONDROITIN SU 500-400 MG
CAPSULE ORAL
Qty: 100 EACH | Refills: 3 | Status: SHIPPED | OUTPATIENT
Start: 2021-04-23

## 2021-04-23 NOTE — TELEPHONE ENCOUNTER
Accu-chek Smartview Meter      Last Written Prescription Date:  n/a  Last Fill Quantity: n/a,   # refills: n/a  Last Office Visit: n/a  Future Office visit:    Next 5 appointments (look out 90 days)    May 05, 2021  9:00 AM  Telephone Visit with Tin Lee MD  [facility] ([facility] ) [address] [address]  [phone]   May 13, 2021  2:25 PM  Pharmacist Visit with Kiki Javed [facility] ([facility] ) [address] [id number] [phone]           Routing refill request to provider for review/approval because:  Drug not active on patient's medication list    The patient had received Accu-chek Smartview Test strips however the patient did not receive a meter for the test strips. Please send a new prescription for a meter to the [facility].    Thank you!  Belinda Savage PhT  [facility] Float Department  On behalf of [facility]

## 2021-04-23 NOTE — TELEPHONE ENCOUNTER
Left message to call back re missed MTM appointment today*  GURINDER La - Patient Advocate and Liaison (PAL) [phone]

## 2021-04-26 NOTE — TELEPHONE ENCOUNTER
Eric left messages on Pete's (prior PAL RN) VM this morning at 10:19 stating returning Kiki's call and requests A1C lab order and another message at 10:31 re medication question.   Left message to call me back [phone].    Abhinav Chery, RN - interim Patient Advocate and Liaison (PAL)

## 2021-04-29 DIAGNOSIS — E11.65 TYPE 2 DIABETES MELLITUS WITH HYPERGLYCEMIA, WITHOUT LONG-TERM CURRENT USE OF INSULIN (H): ICD-10-CM

## 2021-04-29 DIAGNOSIS — Z12.5 SCREENING FOR PROSTATE CANCER: ICD-10-CM

## 2021-04-29 DIAGNOSIS — Z11.59 ENCOUNTER FOR HCV SCREENING TEST FOR LOW RISK PATIENT: ICD-10-CM

## 2021-04-29 DIAGNOSIS — Z11.4 SCREENING FOR HUMAN IMMUNODEFICIENCY VIRUS WITHOUT PRESENCE OF RISK FACTORS: ICD-10-CM

## 2021-04-29 LAB
HBA1C MFR BLD: 12 % (ref 0–5.6)
HCV AB SERPL QL IA: NONREACTIVE
HIV 1+2 AB+HIV1 P24 AG SERPL QL IA: NONREACTIVE

## 2021-04-29 PROCEDURE — 83036 HEMOGLOBIN GLYCOSYLATED A1C: CPT | Performed by: FAMILY MEDICINE

## 2021-04-29 PROCEDURE — 86803 HEPATITIS C AB TEST: CPT | Performed by: FAMILY MEDICINE

## 2021-04-29 PROCEDURE — G0103 PSA SCREENING: HCPCS | Performed by: FAMILY MEDICINE

## 2021-04-29 PROCEDURE — 87389 HIV-1 AG W/HIV-1&-2 AB AG IA: CPT | Performed by: FAMILY MEDICINE

## 2021-04-29 PROCEDURE — 36415 COLL VENOUS BLD VENIPUNCTURE: CPT | Performed by: FAMILY MEDICINE

## 2021-04-30 ENCOUNTER — IMMUNIZATION (OUTPATIENT)
Dept: NURSING | Facility: CLINIC | Age: 66
End: 2021-04-30
Attending: INTERNAL MEDICINE
Payer: COMMERCIAL

## 2021-04-30 DIAGNOSIS — E11.65 TYPE 2 DIABETES MELLITUS WITH HYPERGLYCEMIA, WITHOUT LONG-TERM CURRENT USE OF INSULIN (H): ICD-10-CM

## 2021-04-30 LAB — PSA SERPL-ACNC: 0.32 UG/L (ref 0–4)

## 2021-04-30 PROCEDURE — 0002A PR COVID VAC PFIZER DIL RECON 30 MCG/0.3 ML IM: CPT

## 2021-04-30 PROCEDURE — 91300 PR COVID VAC PFIZER DIL RECON 30 MCG/0.3 ML IM: CPT

## 2021-04-30 RX ORDER — GLIPIZIDE 10 MG/1
TABLET, FILM COATED, EXTENDED RELEASE ORAL
Qty: 180 TABLET | Refills: 1 | Status: SHIPPED | OUTPATIENT
Start: 2021-04-30 | End: 2021-09-07

## 2021-05-03 ENCOUNTER — TELEPHONE (OUTPATIENT)
Dept: FAMILY MEDICINE | Facility: CLINIC | Age: 66
End: 2021-05-03

## 2021-05-03 NOTE — TELEPHONE ENCOUNTER
Patient calling for A1C results.  Advised of below.  Advised A1C 12.0 this time and last time 4/8/21 was 13.1.  GURINDER Peña Michelle T, [facility]   4/29/2021 12:34 PM CDT      Improved A1c from 3 weeks ago.     Kiki Javed, PharmD  Medication Therapy Management Provider, [facility]  Pager: [phone]

## 2021-05-05 ENCOUNTER — VIRTUAL VISIT (OUTPATIENT)
Dept: ENDOCRINOLOGY | Facility: CLINIC | Age: 66
End: 2021-05-05
Payer: COMMERCIAL

## 2021-05-05 DIAGNOSIS — I10 HYPERTENSION GOAL BP (BLOOD PRESSURE) < 140/90: ICD-10-CM

## 2021-05-05 DIAGNOSIS — E11.21 TYPE 2 DIABETES MELLITUS WITH DIABETIC NEPHROPATHY, WITHOUT LONG-TERM CURRENT USE OF INSULIN (H): Primary | ICD-10-CM

## 2021-05-05 PROCEDURE — 99214 OFFICE O/P EST MOD 30 MIN: CPT | Mod: 95 | Performed by: INTERNAL MEDICINE

## 2021-05-05 NOTE — PROGRESS NOTES
"Eric is a 65 year old who is being evaluated via a billable telephone visit.      What phone number would you like to be contacted at? [phone]  How would you like to obtain your AVS? Ne    CC: DM.    HPI:   Patient presents for management of DM.   Original diagnosis in 1990.     Admits to staying home and eating \"crappy\" food since COVID began.   States he cannot take insulin because he is a . Informed him that he can still drive on insulin, he just needs a waiver.     He is taking metformin, victoza, actos, and glipizide.   Just started checking glucose again.   180 this morning.     Notes he has lost 10 pounds since stopping eating bread two weeks ago.     ROS: 10 point ROS neg other than the symptoms noted above in the HPI.    PMH:   Patient Active Problem List   Diagnosis     Type 2 diabetes mellitus with hyperglycemia; A1c goal <7     Essential hypertension with goal blood pressure less than 140/90     Rosacea     Hyperlipidemia LDL goal <70     Stenosis of left subclavian artery (H)     Advanced directives, counseling/discussion     Family history of prostate cancer in father      Morbid obesity due to excess calories (H)     JUSTIN (obstructive sleep apnea)     Type 2 diabetes mellitus with diabetic nephropathy (H)     Bilateral incipient cataracts      Meds:  Current Outpatient Medications   Medication     alcohol swab prep pads     amLODIPine (NORVASC) 10 MG tablet     aspirin 81 MG EC tablet     blood glucose (ACCU-CHEK SMARTVIEW) test strip     blood glucose calibration (NO BRAND SPECIFIED) solution     blood glucose monitoring (NO BRAND SPECIFIED) meter device kit     doxycycline hyclate (VIBRAMYCIN) 100 MG capsule     glipiZIDE (GLUCOTROL XL) 10 MG 24 hr tablet     hydrochlorothiazide (HYDRODIURIL) 25 MG tablet     insulin pen needle (B-D U/F) 31G X 5 MM miscellaneous     metFORMIN (GLUCOPHAGE) 1000 MG tablet     order for DME     pioglitazone (ACTOS) 45 MG tablet     simvastatin " (ZOCOR) 20 MG tablet     thin (NO BRAND SPECIFIED) lancets     valsartan (DIOVAN) 160 MG tablet     VICTOZA PEN 18 MG/3ML soln     No current facility-administered medications for this visit.       FHX:   Father had DM.     SHX:  Non-smoker.     Exam:   Gen: In NAD.       A/P:   Type 2 DM - Chronic and uncontrolled. Wants to get glucose under control for a shoulder surgery. Does not want to use insulin. Explained he can still drive on insulin, there is just a waiver program. Discussed SGLT-2i's and acarbose.   -Continue current medications and add jardiance 10 mg every day.   -Lab in 1 month to track progress and trajectory.   -Increase exercise as tolerated.   -Continue to limit carb intake.   -ASA taking.  -BP: elevated on last check. He does have a home cuff now.    Discussed home monitoring.   -NAFL/TORREZ: screening due.   -Lipids: LDL 32, HDL 41, Trg 107 in 7/2020. On simvastatin.   -Microalbumin 155 in 7/2020. On valsartan.   -Eyes: no DR 8/2020.   -Smoking: none.        Due to the COVID 19 pandemic this visit was a telephone/video visit in order to help prevent spread of infection in this high risk patient and the general population. The patient gave verbal consent for the visit today.    I have independently reviewed and interpreted labs, imaging as indicated.     Visit Start time 0900  Visit Stop time 0920  20 minutes spent on the date of the encounter doing chart review, history and exam, documentation and further activities as noted above.   If this were a face to face visit, it would be billed as [id number].    Tin Lee MD on 5/5/2021 at 9:20 AM

## 2021-05-05 NOTE — LETTER
"    5/5/2021         RE: Francois Fontana  [address] [facility] [address]        Dear Colleague,    Thank you for referring your patient, Francois Fontana, to the [facility]. Please see a copy of my visit note below.    Eric is a 65 year old who is being evaluated via a billable telephone visit.      What phone number would you like to be contacted at? [phone]  How would you like to obtain your AVS? Ne    CC: DM.    HPI:   Patient presents for management of DM.   Original diagnosis in 1990.     Admits to staying home and eating \"crappy\" food since COVID began.   States he cannot take insulin because he is a . Informed him that he can still drive on insulin, he just needs a waiver.     He is taking metformin, victoza, actos, and glipizide.   Just started checking glucose again.   180 this morning.     Notes he has lost 10 pounds since stopping eating bread two weeks ago.     ROS: 10 point ROS neg other than the symptoms noted above in the HPI.    PMH:   Patient Active Problem List   Diagnosis     Type 2 diabetes mellitus with hyperglycemia; A1c goal <7     Essential hypertension with goal blood pressure less than 140/90     Rosacea     Hyperlipidemia LDL goal <70     Stenosis of left subclavian artery (H)     Advanced directives, counseling/discussion     Family history of prostate cancer in father      Morbid obesity due to excess calories (H)     JUSTIN (obstructive sleep apnea)     Type 2 diabetes mellitus with diabetic nephropathy (H)     Bilateral incipient cataracts      Meds:  Current Outpatient Medications   Medication     alcohol swab prep pads     amLODIPine (NORVASC) 10 MG tablet     aspirin 81 MG EC tablet     blood glucose (ACCU-CHEK SMARTVIEW) test strip     blood glucose calibration (NO BRAND SPECIFIED) solution     blood glucose monitoring (NO BRAND SPECIFIED) meter device kit     doxycycline hyclate (VIBRAMYCIN) 100 MG capsule     glipiZIDE " (GLUCOTROL XL) 10 MG 24 hr tablet     hydrochlorothiazide (HYDRODIURIL) 25 MG tablet     insulin pen needle (B-D U/F) 31G X 5 MM miscellaneous     metFORMIN (GLUCOPHAGE) 1000 MG tablet     order for DME     pioglitazone (ACTOS) 45 MG tablet     simvastatin (ZOCOR) 20 MG tablet     thin (NO BRAND SPECIFIED) lancets     valsartan (DIOVAN) 160 MG tablet     VICTOZA PEN 18 MG/3ML soln     No current facility-administered medications for this visit.       FHX:   Father had DM.     SHX:  Non-smoker.     Exam:   Gen: In NAD.       A/P:   Type 2 DM - Chronic and uncontrolled. Wants to get glucose under control for a shoulder surgery. Does not want to use insulin. Explained he can still drive on insulin, there is just a waiver program. Discussed SGLT-2i's and acarbose.   -Continue current medications and add jardiance 10 mg every day.   -Lab in 1 month to track progress and trajectory.   -Increase exercise as tolerated.   -Continue to limit carb intake.   -ASA taking.  -BP: elevated on last check. He does have a home cuff now.    Discussed home monitoring.   -NAFL/TORREZ: screening due.   -Lipids: LDL 32, HDL 41, Trg 107 in 7/2020. On simvastatin.   -Microalbumin 155 in 7/2020. On valsartan.   -Eyes: no DR 8/2020.   -Smoking: none.        Due to the COVID 19 pandemic this visit was a telephone/video visit in order to help prevent spread of infection in this high risk patient and the general population. The patient gave verbal consent for the visit today.    I have independently reviewed and interpreted labs, imaging as indicated.     Visit Start time 0900  Visit Stop time 0920  20 minutes spent on the date of the encounter doing chart review, history and exam, documentation and further activities as noted above.   If this were a face to face visit, it would be billed as [id number].    Tin Lee MD on 5/5/2021 at 9:20 AM          Again, thank you for allowing me to participate in the care of your patient.         Sincerely,        Tin Lee MD

## 2021-05-06 RX ORDER — VALSARTAN 160 MG/1
TABLET ORAL
Qty: 90 TABLET | Refills: 0 | Status: SHIPPED | OUTPATIENT
Start: 2021-05-06 | End: 2021-07-30

## 2021-05-19 DIAGNOSIS — E11.21 TYPE 2 DIABETES MELLITUS WITH DIABETIC NEPHROPATHY, WITHOUT LONG-TERM CURRENT USE OF INSULIN (H): ICD-10-CM

## 2021-05-19 DIAGNOSIS — I10 HYPERTENSION GOAL BP (BLOOD PRESSURE) < 140/90: Primary | ICD-10-CM

## 2021-05-19 LAB — HBA1C MFR BLD: 10.4 % (ref 0–5.6)

## 2021-05-19 PROCEDURE — 36415 COLL VENOUS BLD VENIPUNCTURE: CPT | Performed by: INTERNAL MEDICINE

## 2021-05-19 PROCEDURE — 83036 HEMOGLOBIN GLYCOSYLATED A1C: CPT | Performed by: INTERNAL MEDICINE

## 2021-06-01 DIAGNOSIS — I10 ESSENTIAL HYPERTENSION WITH GOAL BLOOD PRESSURE LESS THAN 140/90: ICD-10-CM

## 2021-06-01 DIAGNOSIS — E78.5 HYPERLIPIDEMIA LDL GOAL <70: ICD-10-CM

## 2021-06-01 RX ORDER — FLURBIPROFEN SODIUM 0.3 MG/ML
SOLUTION/ DROPS OPHTHALMIC
Qty: 100 EACH | Refills: 0 | Status: SHIPPED | OUTPATIENT
Start: 2021-06-01 | End: 2021-08-23

## 2021-06-01 NOTE — TELEPHONE ENCOUNTER
Patient informed Rx sent. Changed prescribing provider from Sherri (retired) to PCP Dr. Danna Montoya.Reminder 6/10 2:15 lab only A1C and BMP per Dr. Lee.  Abhinav Chery RN - Patient Advocate and Liaison (PAL)/[facility]

## 2021-06-10 DIAGNOSIS — E78.5 HYPERLIPIDEMIA LDL GOAL <70: ICD-10-CM

## 2021-06-10 DIAGNOSIS — E11.21 TYPE 2 DIABETES MELLITUS WITH DIABETIC NEPHROPATHY, WITHOUT LONG-TERM CURRENT USE OF INSULIN (H): ICD-10-CM

## 2021-06-10 LAB
ANION GAP SERPL CALCULATED.3IONS-SCNC: 8 MMOL/L (ref 3–14)
BUN SERPL-MCNC: 41 MG/DL (ref 7–30)
CALCIUM SERPL-MCNC: 9.2 MG/DL (ref 8.5–10.1)
CHLORIDE SERPL-SCNC: 108 MMOL/L (ref 94–109)
CO2 SERPL-SCNC: 21 MMOL/L (ref 20–32)
CREAT SERPL-MCNC: 1.34 MG/DL (ref 0.66–1.25)
GFR SERPL CREATININE-BSD FRML MDRD: 55 ML/MIN/{1.73_M2}
GLUCOSE SERPL-MCNC: 82 MG/DL (ref 70–99)
HBA1C MFR BLD: 8.6 % (ref 0–5.6)
POTASSIUM SERPL-SCNC: 5 MMOL/L (ref 3.4–5.3)
SODIUM SERPL-SCNC: 137 MMOL/L (ref 133–144)

## 2021-06-10 PROCEDURE — 36415 COLL VENOUS BLD VENIPUNCTURE: CPT | Performed by: INTERNAL MEDICINE

## 2021-06-10 PROCEDURE — 83036 HEMOGLOBIN GLYCOSYLATED A1C: CPT | Performed by: INTERNAL MEDICINE

## 2021-06-10 PROCEDURE — 80048 BASIC METABOLIC PNL TOTAL CA: CPT | Performed by: INTERNAL MEDICINE

## 2021-06-10 RX ORDER — SIMVASTATIN 20 MG
TABLET ORAL
Qty: 90 TABLET | Refills: 0 | Status: SHIPPED | OUTPATIENT
Start: 2021-06-10 | End: 2021-09-16

## 2021-06-10 NOTE — TELEPHONE ENCOUNTER
Medication is being filled for 1 time refill only due to:  Patient needs to be seen because he is due for his annual visit.    Routed to  to schedule.     ACE EstebanN, RN  [facility]

## 2021-06-11 ENCOUNTER — MYC MEDICAL ADVICE (OUTPATIENT)
Dept: FAMILY MEDICINE | Facility: CLINIC | Age: 66
End: 2021-06-11

## 2021-06-11 DIAGNOSIS — E11.21 TYPE 2 DIABETES MELLITUS WITH DIABETIC NEPHROPATHY, WITHOUT LONG-TERM CURRENT USE OF INSULIN (H): Primary | ICD-10-CM

## 2021-06-11 NOTE — TELEPHONE ENCOUNTER
Pt LMOVM for SB3 pal, returning call to schedule appointment, routed to [facility], please call pt to schedule appointment  Louisa Barahona RN, BSN  Message handled by CLINIC NURSE.

## 2021-06-14 NOTE — TELEPHONE ENCOUNTER
Called patient, left a message to call clinic. Please help patient schedule a future appointment. Ruth Behrens.

## 2021-06-21 NOTE — PROGRESS NOTES
Pre-Visit Planning   Next 5 appointments (look out 90 days)    Jun 28, 2021  2:15 PM  (Arrive by 1:55 PM)  Pre-Operative Physical with Jagruti Montoya MD  [facility] )  Arrive at:  [facility] [id number] [place] [address] [phone]        Appointment Notes for this encounter:   pre op dos 6-30 shoulder Dr Askew Tco    Problem List as of 6/28/2021 Reviewed: 4/8/2021  4:59 PM by Jagruti Figueredo MD       Respiratory    JUSTIN (obstructive sleep apnea)       Digestive    Morbid obesity due to excess calories (H)       Endocrine    Type 2 diabetes mellitus with hyperglycemia; A1c goal <7    Hyperlipidemia LDL goal <70    Type 2 diabetes mellitus with diabetic nephropathy (H)       Circulatory    Essential hypertension with goal blood pressure less than 140/90    Stenosis of left subclavian artery (H)       Musculoskeletal and Integumentary    Rosacea       Other    Advanced directives, counseling/discussion    Family history of prostate cancer in father     Bilateral incipient cataracts        Lab Results   Component Value Date    WBC 8.8 04/08/2021    HGB 13.3 04/08/2021    HCT 38.8 (L) 04/08/2021     04/08/2021    CHOL 94 07/22/2020    TRIG 107 07/22/2020    HDL 41 07/22/2020    ALT 37 08/05/2017    AST 14 08/05/2017     06/10/2021    BUN 41 (H) 06/10/2021    CO2 21 06/10/2021    TSH 1.51 04/08/2021    PSA 0.32 04/29/2021       Questionnaires Reviewed/Assigned  No additional questionnaires are needed        Patient preferred phone number: [phone]      Spoke to patient via phone. Are there any additional questions or concerns you'd like to review with your provider during your visit? No    Patient does not have additional questions or concerns.        Visit is not preventive.    Meds  Is there anything on your medication list that needs to be updated? No    Current Outpatient Medications  "  Medication     alcohol swab prep pads     amLODIPine (NORVASC) 10 MG tablet     aspirin 81 MG EC tablet     B-D U/F insulin pen needle     blood glucose (ACCU-CHEK SMARTVIEW) test strip     blood glucose calibration (NO BRAND SPECIFIED) solution     blood glucose monitoring (NO BRAND SPECIFIED) meter device kit     doxycycline hyclate (VIBRAMYCIN) 100 MG capsule     empagliflozin (JARDIANCE) 10 MG TABS tablet     empagliflozin (JARDIANCE) 25 MG TABS tablet     glipiZIDE (GLUCOTROL XL) 10 MG 24 hr tablet     hydrochlorothiazide (HYDRODIURIL) 25 MG tablet     metFORMIN (GLUCOPHAGE) 1000 MG tablet     order for DME     pioglitazone (ACTOS) 45 MG tablet     simvastatin (ZOCOR) 20 MG tablet     thin (NO BRAND SPECIFIED) lancets     valsartan (DIOVAN) 160 MG tablet     VICTOZA PEN 18 MG/3ML soln     No current facility-administered medications for this visit.      Which pharmacy do you prefer to use for medications during this visit if needed? [facility]    Do you need refills on any of your medications? No    Health Maintenance Due   Topic Date Due     ZOSTER IMMUNIZATION (1 of 2) Never done     PREVENTIVE CARE VISIT  11/17/2016     COLORECTAL CANCER SCREENING  08/19/2018       [employer]  Patient is active on [employer].    Questionnaire Review   Advised patient to arrive early in order to complete questionnaires.    Call Summary  \"Thank you for your time today.  If anything comes up before your appointment, please feel free to contact us at [phone].\"    ACE JohnsonN, RN - Patient Advocate and Liaison (PAL)   [facility]   [phone]  "

## 2021-06-28 ENCOUNTER — OFFICE VISIT (OUTPATIENT)
Dept: FAMILY MEDICINE | Facility: CLINIC | Age: 66
End: 2021-06-28
Payer: OTHER MISCELLANEOUS

## 2021-06-28 VITALS
HEART RATE: 102 BPM | RESPIRATION RATE: 16 BRPM | TEMPERATURE: 98.6 F | DIASTOLIC BLOOD PRESSURE: 73 MMHG | OXYGEN SATURATION: 96 % | SYSTOLIC BLOOD PRESSURE: 144 MMHG | BODY MASS INDEX: 36.44 KG/M2 | WEIGHT: 269 LBS | HEIGHT: 72 IN

## 2021-06-28 DIAGNOSIS — I77.1 STENOSIS OF LEFT SUBCLAVIAN ARTERY (H): ICD-10-CM

## 2021-06-28 DIAGNOSIS — M75.102 ROTATOR CUFF SYNDROME, LEFT: ICD-10-CM

## 2021-06-28 DIAGNOSIS — E11.65 TYPE 2 DIABETES MELLITUS WITH HYPERGLYCEMIA, WITHOUT LONG-TERM CURRENT USE OF INSULIN (H): ICD-10-CM

## 2021-06-28 DIAGNOSIS — Z01.818 PREOPERATIVE EXAMINATION: Primary | ICD-10-CM

## 2021-06-28 LAB — HGB BLD-MCNC: 13.1 G/DL (ref 13.3–17.7)

## 2021-06-28 PROCEDURE — 36415 COLL VENOUS BLD VENIPUNCTURE: CPT | Performed by: FAMILY MEDICINE

## 2021-06-28 PROCEDURE — 82043 UR ALBUMIN QUANTITATIVE: CPT | Performed by: FAMILY MEDICINE

## 2021-06-28 PROCEDURE — 99214 OFFICE O/P EST MOD 30 MIN: CPT | Performed by: FAMILY MEDICINE

## 2021-06-28 PROCEDURE — 85018 HEMOGLOBIN: CPT | Performed by: FAMILY MEDICINE

## 2021-06-28 RX ORDER — TRAMADOL HYDROCHLORIDE 50 MG/1
TABLET ORAL
COMMUNITY
Start: 2021-06-10 | End: 2023-05-15

## 2021-06-28 ASSESSMENT — MIFFLIN-ST. JEOR: SCORE: 2048.18

## 2021-06-28 NOTE — PROGRESS NOTES
[facility]  [id number] [place] [id number]  Phone: [phone]  Primary Provider: Misael Figueredo  Pre-op Performing Provider: MISAEL FIGUEREDO      PREOPERATIVE EVALUATION:  Today's date: 6/28/2021    Francois Fontana is a 65 year old male who presents for a preoperative evaluation.    Surgical Information:  Surgery/Procedure: Left Shoulder, rotator cuff  Surgery Location: [facility]  Surgeon: Dr. sears  Surgery Date: 6/30/21  Time of Surgery: 12:00 pm  Where patient plans to recover: At home with family  Fax number for surgical facility: [phone]    Type of Anesthesia Anticipated: General    Assessment & Plan     The proposed surgical procedure is considered INTERMEDIATE risk.    1. Preoperative examination  - Hemoglobin  2.  Rotator cuff syndrome, left  3.  Type 2 diabetes mellitus with hyperglycemia, without long-term current use of insulin (H)  - Albumin Random Urine Quantitative with Creat Ratio  4.  Stenosis of left subclavian artery (H)  -Last seen in 2010, told to re-consult vascular if symptomatics.  Patient reports no symptoms.          Risks and Recommendations:  The patient has the following additional risks and recommendations for perioperative complications:  Diabetes:  - Patient is not on insulin therapy: regular NPO guidelines can be followed.     Medication Instructions:   - aspirin: Discontinue aspirin 7-10 days prior to procedure to reduce bleeding risk. It should be resumed postoperatively.    - ACE/ARB: May be continued on the day of surgery.    - Diuretics: HOLD on the day of surgery.   - Statins: Continue taking on the day of surgery.    - metformin: HOLD day of surgery.   - sulfonylurea (e.g. glyburide, glipizide): HOLD day of surgery   - SGLT2 Inhibitor (canagliflozin, dapagliflozin, or empagliflozin): HOLD 3 days before surgery.    - Thiazolidinedione (e.g. rosiglitazone, pioglitazone): HOLD day of surgery.   - GLP-1  Injectable (exenitide, liraglutide, semaglutide, dulaglutide, etc.): HOLD day of surgery     RECOMMENDATION:  APPROVAL GIVEN to proceed with proposed procedure, without further diagnostic evaluation.      20 minutes spent on the date of the encounter doing chart review, history and exam, documentation and further activities per the note        Subjective     HPI related to upcoming procedure: Left rotator cuff tear    1. No - Have you ever had a heart attack or stroke?  2. No - Have you ever had surgery on your heart or blood vessels, such as a stent, coronary (heart) bypass, or surgery on an artery in the head, neck, heart, or legs?  3. No - Do you have chest pain when you are physically active?  4. No - Do you have a history of heart failure?  5. No - Do you currently have a cold, bronchitis, or symptoms of other respiratory (head and chest) infections?  6. No - Do you have a cough, shortness of breath, or wheezing?  7. No - Do you or anyone in your family have a history of blood clots?  8. No - Do you or anyone in your family have a serious bleeding problem, such as long-lasting bleeding after surgeries or cuts?  9. No - Have you ever had anemia or been told to take iron pills?  10. No - Have you had any abnormal blood loss such as black, tarry or bloody stools, or abnormal vaginal bleeding?  11. No - Have you ever had a blood transfusion?  12. Yes - Are you willing to have a blood transfusion if it is medically needed before, during, or after your surgery?  13. No - Have you or anyone in your family ever had problems with anesthesia (sedation for surgery)?  14.  YES - Do you have sleep apnea, excessive snoring, or daytime drowsiness? SLEEP APNEA< CPAP  15. No - Do you have any artifical heart valves or other implanted medical devices, such as a pacemaker, defibrillator, or continuous glucose monitor?  16. No - Do you have any artifical joints?  17. No - Are you allergic to latex?  18. No - Is there any chance  that you may be pregnant?         Health Care Directive:  Patient does not have a Health Care Directive or Living Will: Discussed advance care planning with patient; however, patient declined at this time.    Preoperative Review of :   reviewed - controlled substances reflected in medication list.    Status of Chronic Conditions:  See problem list for active medical problems.  Problems all longstanding and stable, except as noted/documented.  See ROS for pertinent symptoms related to these conditions.    DIABETES - Patient has a longstanding history of DiabetesType Type II . Patient is being treated with diet and oral agents and denies significant side effects. Control has been fair. Complicating factors include but are not limited to: hypertension, hyperlipidemia and morbid obesity .     HYPERLIPIDEMIA - Patient has a long history of significant Hyperlipidemia requiring medication for treatment with recent good control. Patient reports no problems or side effects with the medication.     HYPERTENSION - Patient has longstanding history of HTN , currently denies any symptoms referable to elevated blood pressure. Specifically denies chest pain, palpitations, dyspnea, orthopnea, PND or peripheral edema. Blood pressure readings have been in normal range. Current medication regimen is as listed below. Patient denies any side effects of medication.       Review of Systems  CONSTITUTIONAL: NEGATIVE for fever, chills, change in weight  INTEGUMENTARY/SKIN: NEGATIVE for worrisome rashes, moles or lesions  EYES: NEGATIVE for vision changes or irritation  ENT/MOUTH: NEGATIVE for ear, mouth and throat problems  RESP: NEGATIVE for significant cough or SOB  CV: NEGATIVE for chest pain, palpitations or peripheral edema  GI: NEGATIVE for nausea, vomiting, abdominal pain, heartburn, or change in bowel habits  : NEGATIVE for frequency, dysuria, or hematuria  MUSCULOSKELETAL: NEGATIVE for significant arthralgias or myalgia. +  Chronic left shoulder pains  NEURO: NEGATIVE for weakness, dizziness or paresthesias  ENDOCRINE: NEGATIVE for temperature intolerance, skin/hair changes  HEME: NEGATIVE for bleeding problems  PSYCHIATRIC: NEGATIVE for changes in mood or affect      Patient Active Problem List    Diagnosis Date Noted     Essential hypertension with goal blood pressure less than 140/90 02/14/2003     Priority: High     Type 2 diabetes mellitus with hyperglycemia; A1c goal <7 07/22/2002     Priority: High     Dx 1990       Bilateral incipient cataracts 08/14/2020     Priority: Medium     Type 2 diabetes mellitus with diabetic nephropathy (H) 11/20/2015     Priority: Medium     JUSTIN (obstructive sleep apnea) 09/22/2015     Priority: Medium     Family history of prostate cancer in father  10/05/2012     Priority: Medium     Morbid obesity due to excess calories (H) 10/05/2012     Priority: Medium     Advanced directives, counseling/discussion 08/25/2011     Priority: Medium     Advance Directive Problem List Overview:   Name Relationship Phone    Primary Health Care Agent            Alternative Health Care Agent          Discussed advance care planning with patient; information given to patient to review. 8/25/2011     Follow up to [facility] [employer] referral call placed. Patient would like to review document provided at visit, establish an agent and then will schedule facilitation if needed. Key Obando LPN/Advanced Healthcare Planning Facilitator   9/9/11           Stenosis of left subclavian artery (H) 11/30/2010     Priority: Medium     Dr Gonzales consult - 2010 -recommended: reconsult if any symptoms develop       Hyperlipidemia LDL goal <70 10/31/2010     Priority: Medium     Rosacea 07/13/2004     Priority: Medium      Past Medical History:   Diagnosis Date     Diabetes mellitus, type 2 (H)      Hyperlipidemia      Rosacea      Stenosis of left subclavian artery (H)      Type II or unspecified type diabetes mellitus without  mention of complication, not stated as uncontrolled     Abstracted 07/18/02     Unspecified essential hypertension      Past Surgical History:   Procedure Laterality Date     HERNIA REPAIR, INGUINAL RT/LT  1993    right     Current Outpatient Medications   Medication Sig Dispense Refill     alcohol swab prep pads Use to swab area of injection/jorge as directed. 100 each 3     amLODIPine (NORVASC) 10 MG tablet TAKE ONE TABLET BY MOUTH ONCE DAILY 90 tablet 0     B-D U/F insulin pen needle USE ONE PEN NEEDLE DAILY OR AS DIRECTED. 100 each 0     blood glucose (ACCU-CHEK SMARTVIEW) test strip Use to test blood sugar 3 times daily or as directed. 300 strip 3     blood glucose calibration (NO BRAND SPECIFIED) solution To accompany: Blood Glucose Monitor Brands: per insurance. 1 each 0     blood glucose monitoring (NO BRAND SPECIFIED) meter device kit Use to test blood sugar 3 times daily or as directed. Preferred blood glucose meter OR supplies to accompany: Blood Glucose Monitor Brands: per insurance. 1 kit 0     doxycycline hyclate (VIBRAMYCIN) 100 MG capsule Take 1 capsule (100 mg) by mouth 2 times daily 180 capsule 1     empagliflozin (JARDIANCE) 25 MG TABS tablet Take 1 tablet (25 mg) by mouth daily 30 tablet 11     glipiZIDE (GLUCOTROL XL) 10 MG 24 hr tablet TAKE TWO TABLETS BY MOUTH ONCE DAILY 180 tablet 1     hydrochlorothiazide (HYDRODIURIL) 25 MG tablet TAKE ONE TABLET BY MOUTH ONCE DAILY 90 tablet 0     metFORMIN (GLUCOPHAGE) 1000 MG tablet TAKE ONE TABLET BY MOUTH TWICE A DAY WITH MEALS 180 tablet 0     order for DME Patient Francois Fontana was set up at [facility] on September 23, 2015. Patient received a Alayna Respironics DreamStation Auto CPAP Auto. Pressures were set at Auto 6 - 12 cm H2O.   Patient s ramp is 5 cm H2O for 30 min and FLEX/EPR is A Flex.  Patient received a Alayna Respironics Mask name: Wisp  Nasal mask Size Small, Medium, Large, heated tubing and heated humidifier.  Patient is enrolled in the  STM Program and does need to meet compliance. Patient has a follow up on 11/4/15 with Bennett Goltz, PA.     Myra Lim (entered by Nitin Wilson)       pioglitazone (ACTOS) 45 MG tablet TAKE ONE TABLET BY MOUTH ONCE DAILY 90 tablet 0     simvastatin (ZOCOR) 20 MG tablet TAKE ONE TABLET BY MOUTH EVERY NIGHT AT BEDTIME 90 tablet 0     thin (NO BRAND SPECIFIED) lancets Use with lanceting device. To accompany: Blood Glucose Monitor Brands: per insurance. 200 each 3     valsartan (DIOVAN) 160 MG tablet TAKE ONE TABLET BY MOUTH ONCE DAILY 90 tablet 0     VICTOZA PEN 18 MG/3ML soln INJECT 1.8 MG SUBCUTANEOUSLY ONCE DAILY 9 mL 5     aspirin 81 MG EC tablet Take 81 mg by mouth daily       empagliflozin (JARDIANCE) 10 MG TABS tablet Take 1 tablet (10 mg) by mouth daily 30 tablet 11       No Known Allergies     Social History     Tobacco Use     Smoking status: Former Smoker     Quit date: 1985     Years since quittin.9     Smokeless tobacco: Never Used     Tobacco comment: quit    Substance Use Topics     Alcohol use: No     Alcohol/week: 0.0 standard drinks     Comment: quit drinking age 30       History   Drug Use No         Objective     BP (!) 144/73 (BP Location: Right arm, Patient Position: Sitting, Cuff Size: Adult Large)   Pulse 102   Temp 98.6  F (37  C) (Oral)   Resp 16   Ht 1.829 m (6')   Wt 122 kg (269 lb)   SpO2 96%   BMI 36.48 kg/m      Physical Exam    GENERAL APPEARANCE: healthy, alert and no distress     EYES: EOMI,  PERRL     HENT: ear canals and TM's normal and nose and mouth without ulcers or lesions     NECK: no adenopathy, no asymmetry, masses, or scars and thyroid normal to palpation     RESP: lungs clear to auscultation - no rales, rhonchi or wheezes     CV: regular rates and rhythm, normal S1 S2, no S3 or S4 and no murmur, click or rub     ABDOMEN:  soft, nontender, no HSM or masses and bowel sounds normal     MS: Left arm in sling     SKIN: no suspicious lesions or  rashes     NEURO: Normal strength and tone, sensory exam grossly normal, mentation intact and speech normal     PSYCH: mentation appears normal. and affect normal/bright     LYMPHATICS: No cervical adenopathy    Recent Labs   Lab Test 06/10/21  1409 05/19/21  1321 04/08/21  1633 04/08/21  1633 04/08/21  1624   HGB  --   --   --   --  13.3   PLT  --   --   --   --  277     --   --  134  --    POTASSIUM 5.0  --   --  4.8  --    CR 1.34*  --   --  1.07  --    A1C 8.6* 10.4*   < > 13.1*  --     < > = values in this interval not displayed.        Diagnostics:  Labs pending at this time.  Results will be reviewed when available. (Hemoglobin pending)  No EKG required, no history of coronary heart disease, significant arrhythmia, peripheral arterial disease or other structural heart disease.    Revised Cardiac Risk Index (RCRI):  The patient has the following serious cardiovascular risks for perioperative complications:   - No serious cardiac risks = 0 points     RCRI Interpretation: 0 points: Class I (very low risk - 0.4% complication rate)           Signed Electronically by: Jagruti Montoya MD  Copy of this evaluation report is provided to requesting physician.

## 2021-06-28 NOTE — PATIENT INSTRUCTIONS
Do not take the following medications on the morning of surgery:  Metformin, Actos (pioglitazone), Victoza, Glipizide, hydrochlorothiazide.  Resume After surgery    Do not take Jardiance starting today until surgery.  Resume after surgery.

## 2021-06-29 LAB
CREAT UR-MCNC: 133 MG/DL
MICROALBUMIN UR-MCNC: 91 MG/L
MICROALBUMIN/CREAT UR: 68.5 MG/G CR (ref 0–17)

## 2021-07-30 DIAGNOSIS — I10 HYPERTENSION GOAL BP (BLOOD PRESSURE) < 140/90: ICD-10-CM

## 2021-07-30 RX ORDER — VALSARTAN 160 MG/1
TABLET ORAL
Qty: 90 TABLET | Refills: 0 | Status: SHIPPED | OUTPATIENT
Start: 2021-07-30 | End: 2021-10-27

## 2021-07-30 NOTE — TELEPHONE ENCOUNTER
Routing refill request to provider for review/approval because:  Tia given x1 and patient did not follow up, please advise  Labs out of range:  CR  Elevated BP    Renee Davis RN on 7/30/2021 at 4:13 PM

## 2021-08-03 NOTE — TELEPHONE ENCOUNTER
Patient informed. Declines to schedule due to costs of visits. Informed patient apt needed before further refills.

## 2021-08-23 DIAGNOSIS — I10 ESSENTIAL HYPERTENSION WITH GOAL BLOOD PRESSURE LESS THAN 140/90: ICD-10-CM

## 2021-08-23 DIAGNOSIS — E78.5 HYPERLIPIDEMIA LDL GOAL <70: ICD-10-CM

## 2021-08-24 DIAGNOSIS — E11.65 TYPE 2 DIABETES MELLITUS WITH HYPERGLYCEMIA, WITHOUT LONG-TERM CURRENT USE OF INSULIN (H): ICD-10-CM

## 2021-08-24 RX ORDER — LIRAGLUTIDE 6 MG/ML
1.8 INJECTION SUBCUTANEOUS DAILY
Qty: 9 ML | Refills: 0 | Status: SHIPPED | OUTPATIENT
Start: 2021-08-24 | End: 2021-10-27

## 2021-08-24 RX ORDER — LIRAGLUTIDE 6 MG/ML
INJECTION SUBCUTANEOUS
Qty: 9 ML | Refills: 0 | OUTPATIENT
Start: 2021-08-24

## 2021-08-24 RX ORDER — FLURBIPROFEN SODIUM 0.3 MG/ML
SOLUTION/ DROPS OPHTHALMIC
Qty: 100 EACH | Refills: 1 | Status: SHIPPED | OUTPATIENT
Start: 2021-08-24 | End: 2021-12-20

## 2021-08-24 NOTE — TELEPHONE ENCOUNTER
Prescription approved per [facility] Refill Protocol.  JOSE refill. Needs appointment.  MA/TC to assist patient with scheduling follow up appointment.  Jalyn Askew RN

## 2021-08-24 NOTE — TELEPHONE ENCOUNTER
Patient calling checkin status on refill request-would like sent to [facility].

## 2021-09-03 DIAGNOSIS — L71.9 ROSACEA: ICD-10-CM

## 2021-09-03 DIAGNOSIS — E11.65 TYPE 2 DIABETES MELLITUS WITH HYPERGLYCEMIA, WITHOUT LONG-TERM CURRENT USE OF INSULIN (H): ICD-10-CM

## 2021-09-03 DIAGNOSIS — E78.5 HYPERLIPIDEMIA LDL GOAL <70: ICD-10-CM

## 2021-09-07 DIAGNOSIS — E11.65 TYPE 2 DIABETES MELLITUS WITH HYPERGLYCEMIA, WITHOUT LONG-TERM CURRENT USE OF INSULIN (H): Primary | ICD-10-CM

## 2021-09-07 DIAGNOSIS — E78.5 HYPERLIPIDEMIA LDL GOAL <70: ICD-10-CM

## 2021-09-07 RX ORDER — DOXYCYCLINE 100 MG/1
CAPSULE ORAL
Qty: 180 CAPSULE | Refills: 3 | Status: SHIPPED | OUTPATIENT
Start: 2021-09-07 | End: 2024-01-08

## 2021-09-07 RX ORDER — SIMVASTATIN 20 MG
TABLET ORAL
Qty: 90 TABLET | Refills: 0 | OUTPATIENT
Start: 2021-09-07

## 2021-09-07 RX ORDER — GLIPIZIDE 10 MG/1
TABLET, FILM COATED, EXTENDED RELEASE ORAL
Qty: 180 TABLET | Refills: 1 | Status: SHIPPED | OUTPATIENT
Start: 2021-09-07 | End: 2022-03-08

## 2021-09-07 NOTE — TELEPHONE ENCOUNTER
Routing refill request to provider for review/approval because:  Drug not on the [facility] refill protocol   Labs not current:  CR, LDL    Renee Davis RN on 9/7/2021 at 10:24 AM

## 2021-09-07 NOTE — TELEPHONE ENCOUNTER
Left msg to call back to clinic. Pt need to make an apt for lab. Future orders placed.Yari Cao MA  [facility].

## 2021-09-11 ENCOUNTER — OFFICE VISIT (OUTPATIENT)
Dept: FAMILY MEDICINE | Facility: CLINIC | Age: 66
End: 2021-09-11
Payer: COMMERCIAL

## 2021-09-11 DIAGNOSIS — Z23 NEED FOR PROPHYLACTIC VACCINATION AND INOCULATION AGAINST INFLUENZA: Primary | ICD-10-CM

## 2021-09-11 PROCEDURE — 90662 IIV NO PRSV INCREASED AG IM: CPT

## 2021-09-11 PROCEDURE — 99207 PR NO CHARGE NURSE ONLY: CPT

## 2021-09-11 PROCEDURE — 90471 IMMUNIZATION ADMIN: CPT

## 2021-09-16 DIAGNOSIS — I10 ESSENTIAL HYPERTENSION WITH GOAL BLOOD PRESSURE LESS THAN 140/90: ICD-10-CM

## 2021-09-16 DIAGNOSIS — E78.5 HYPERLIPIDEMIA LDL GOAL <70: ICD-10-CM

## 2021-09-16 RX ORDER — SIMVASTATIN 20 MG
TABLET ORAL
Qty: 30 TABLET | Refills: 0 | Status: SHIPPED | OUTPATIENT
Start: 2021-09-16 | End: 2021-10-15

## 2021-09-16 NOTE — TELEPHONE ENCOUNTER
Routing refill request to provider for review/approval because:  Tia given x1 and patient did not follow up, please advise  Labs not current:  LDL    Renee Davis RN on 9/16/2021 at 4:11 PM

## 2021-09-16 NOTE — TELEPHONE ENCOUNTER
Reason for Call:  Medication or medication refill:    Do you use a [facility]?  Name of the pharmacy and phone number for the current request:  [facility]    Name of the medication requested: amlodipine simvastatin     Other request: patient is almost out of his medications. Will need refill ASAP     Can we leave a detailed message on this number? YES    Phone number patient can be reached at: Home number on file [phone] (home)    Best Time: any    Call taken on 9/16/2021 at 9:29 AM by Ita Parry

## 2021-09-17 RX ORDER — AMLODIPINE BESYLATE 10 MG/1
10 TABLET ORAL DAILY
Qty: 90 TABLET | Refills: 0 | Status: SHIPPED | OUTPATIENT
Start: 2021-09-17 | End: 2022-03-08

## 2021-09-17 NOTE — TELEPHONE ENCOUNTER
Pt called back stating he need refill on amlodine. Informed he is due for ov visit. Pt states he can't schedule one at this time due to financial problems and not working.    Moraima Zabala MA

## 2021-09-17 NOTE — TELEPHONE ENCOUNTER
Has existing amlodipine refill, 6 months sent in June, simvastatin #30 sent yesterday, pt does have supply    LM for pt to call back, see it pt can get labs now? Has orders from [facility] and endo, also confirm pharmacies, amlodipine went to [facility] and Simvastatin went to [facility]    Louisa Barahona RN, BSN  Message handled by CLINIC NURSE.

## 2021-09-18 ENCOUNTER — HEALTH MAINTENANCE LETTER (OUTPATIENT)
Age: 66
End: 2021-09-18

## 2021-09-22 DIAGNOSIS — E11.65 TYPE 2 DIABETES MELLITUS WITH HYPERGLYCEMIA, WITHOUT LONG-TERM CURRENT USE OF INSULIN (H): ICD-10-CM

## 2021-09-23 NOTE — TELEPHONE ENCOUNTER
Routing refill request to provider for review/approval because:  Labs out of range:  A1c, creatinine    Lab Results   Component Value Date    A1C 8.6 06/10/2021    A1C 10.4 05/19/2021     Creatinine   Date Value Ref Range Status   06/10/2021 1.34 (H) 0.66 - 1.25 mg/dL Final     Pete PINEDA RN

## 2021-09-24 NOTE — TELEPHONE ENCOUNTER
Patient called looking for an update regarding the refill.     Patient informed RN that he is almost out of medication, does not have enough medication to last the weekend.     Patient would appreciate phone call back when medication refill sent to pharmacy.     RENEE Esteban, RN  [facility]

## 2021-09-27 ENCOUNTER — TELEPHONE (OUTPATIENT)
Dept: FAMILY MEDICINE | Facility: CLINIC | Age: 66
End: 2021-09-27

## 2021-09-27 DIAGNOSIS — E11.65 TYPE 2 DIABETES MELLITUS WITH HYPERGLYCEMIA, WITHOUT LONG-TERM CURRENT USE OF INSULIN (H): Primary | ICD-10-CM

## 2021-09-27 RX ORDER — LIRAGLUTIDE 6 MG/ML
INJECTION SUBCUTANEOUS
Qty: 9 ML | Refills: 0 | Status: SHIPPED | OUTPATIENT
Start: 2021-09-27 | End: 2021-11-01

## 2021-09-27 NOTE — TELEPHONE ENCOUNTER
Referral placed for Melany    Eric needs a visit for refills!  He has not been seen for diabetes in more than 3 months, A1c was improved but still uncontrolled.  Please schedule a visit and I will give short term refills.

## 2021-09-27 NOTE — TELEPHONE ENCOUNTER
Patient's wife calls to check on status of medication and to request referral for endocrinologist, reports he used to see Sherri Mayer.     Please advise, thank you.    RENEE Johnson, RN - Patient Advocate and Liaison (PAL)   [facility]   [phone]

## 2021-09-27 NOTE — TELEPHONE ENCOUNTER
M-Changa message sent.    RENEE Johnson, RN - Patient Advocate and Liaison (PAL)   [facility]   [phone]

## 2021-09-29 ENCOUNTER — TELEPHONE (OUTPATIENT)
Dept: FAMILY MEDICINE | Facility: CLINIC | Age: 66
End: 2021-09-29

## 2021-09-29 DIAGNOSIS — E11.65 TYPE 2 DIABETES MELLITUS WITH HYPERGLYCEMIA, WITHOUT LONG-TERM CURRENT USE OF INSULIN (H): Primary | ICD-10-CM

## 2021-09-29 NOTE — TELEPHONE ENCOUNTER
Reason for Call:  Other call back    Detailed comments: Pt calling for she has question as to what other lab tests should Pt come in to check for besides his A1C.    Phone Number Patient can be reached at: Home number on file [phone] (home)    Best Time: anytime    Can we leave a detailed message on this number? YES    Call taken on 9/29/2021 at 10:42 AM by Anthony Boyd

## 2021-09-29 NOTE — TELEPHONE ENCOUNTER
Called patient back regarding need for appointment, direct line given, left voice mail, awaiting call back.    ACE JohnsonN, RN - Patient Advocate and Liaison (PAL)   [facility]   [phone]

## 2021-09-30 NOTE — TELEPHONE ENCOUNTER
Pt LM at 10:28 at [place] [address] PAL line, see note below, routed to 3, call pt back at [phone] (home)   Louisa Barahona RN, BSN  Message handled by CLINIC NURSE.

## 2021-09-30 NOTE — TELEPHONE ENCOUNTER
Message left for patient to return call to this RN PAL - please transfer if available     Direct number left for this RN PAL on message for return call     Lupe Sheffield Registered Nurse PAL (patient advocate liaison)   [facility] [phone]

## 2021-10-01 NOTE — TELEPHONE ENCOUNTER
Called patient to clarify need for testing iron levels, patient states that he wants to check it due to family history.     Routing to provider to inform.     Christina Mary, ACEN, RN  [facility]

## 2021-10-01 NOTE — TELEPHONE ENCOUNTER
Order can be placed as long as patient knows that he may need to sign an ABN stating that if insurance does not cover it he will be responsible for paying out of pocket.    Order went through under diagnosis diabetes.

## 2021-10-01 NOTE — TELEPHONE ENCOUNTER
Called to pt and he would like a lab for iron placed as well.      Pt is having his labs done the day before his appt in late October.    Shreya Cisneros. RN, BSN, PAL (Patient Advocate Liaison)  [facility]   [phone]

## 2021-10-01 NOTE — TELEPHONE ENCOUNTER
Patient is scheduled for labs at 10:45 am.  Please make sure he is fasting for this visit.    Orders have been placed for CBC, BMP, A1c, and Lipids.    What indication does he have for iron testing?  I do not see a history of iron deficiency or anemia on his chart.

## 2021-10-04 NOTE — TELEPHONE ENCOUNTER
Patient verbalized understanding regarding information below and the potential need to pay out of pocket, patient agreeable to plan.    ACE JohnsonN, RN - Patient Advocate and Liaison (PAL)   [facility]   [phone]

## 2021-10-15 ENCOUNTER — TELEPHONE (OUTPATIENT)
Dept: FAMILY MEDICINE | Facility: CLINIC | Age: 66
End: 2021-10-15

## 2021-10-15 NOTE — TELEPHONE ENCOUNTER
"Call to patient. Refill was requested and sent back to [facility] pharmacy. Call to [facility], they'll arrange transfer Rx for patient  tomorrow.   Patient informed due for eye exam, wants scheduling number sent via text. Offered Tap[employer] message. Declined \"doesn't trust internet.\"   Eye exam referral placed in 10/27 OV visit.   Reviewed future lab orders from both endo and PCP. FIT test not returned because has not been picked up.  Will ask lab staff to dispense fit kit at fasting lab only 10/26.  Abhinav Chery RN - Patient Advocate and Liaison (PAL)  [facility]     "

## 2021-10-15 NOTE — TELEPHONE ENCOUNTER
Reason for Call:  Medication or medication refill:    Do you use a [facility]?  Name of the pharmacy and phone number for the current request:  [facility] in [place]     Name of the medication requested: Simvastatin     Other request: Patient will be out of medication this weekend     Can we leave a detailed message on this number? YES    Phone number patient can be reached at: Home number on file [phone] (home)    Best Time: any    Call taken on 10/15/2021 at 1:25 PM by Selwyn Jones

## 2021-10-24 ENCOUNTER — TRANSFERRED RECORDS (OUTPATIENT)
Dept: HEALTH INFORMATION MANAGEMENT | Facility: CLINIC | Age: 66
End: 2021-10-24
Payer: COMMERCIAL

## 2021-10-25 ENCOUNTER — TELEPHONE (OUTPATIENT)
Dept: FAMILY MEDICINE | Facility: CLINIC | Age: 66
End: 2021-10-25
Payer: COMMERCIAL

## 2021-10-25 NOTE — TELEPHONE ENCOUNTER
MAUREEN Montoya   Patient requests RN request copy of CPAP readings faxed to our office for review with PCP at visit 10/27/21. 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   Call to [phone] [facility]. They will fax reports to [phone] Nedra La RN     Patient states he continues to use a Alayna Dream Station CPAP that has been recalled. He declines referral to sleep clinic due to cost.  He will discuss at visit with PCP 10/27/21.   When readings fax arrives, HERMELINDO Nick (watching for fax) or this writer will place in Dr. Danna Montoya's office.   Abhinav Chery RN

## 2021-10-26 ENCOUNTER — LAB (OUTPATIENT)
Dept: LAB | Facility: CLINIC | Age: 66
End: 2021-10-26
Payer: COMMERCIAL

## 2021-10-26 DIAGNOSIS — E11.65 TYPE 2 DIABETES MELLITUS WITH HYPERGLYCEMIA, WITHOUT LONG-TERM CURRENT USE OF INSULIN (H): ICD-10-CM

## 2021-10-26 LAB
BASOPHILS # BLD AUTO: 0.1 10E3/UL (ref 0–0.2)
BASOPHILS NFR BLD AUTO: 1 %
EOSINOPHIL # BLD AUTO: 0.5 10E3/UL (ref 0–0.7)
EOSINOPHIL NFR BLD AUTO: 6 %
ERYTHROCYTE [DISTWIDTH] IN BLOOD BY AUTOMATED COUNT: 14.6 % (ref 10–15)
HCT VFR BLD AUTO: 43.7 % (ref 40–53)
HGB BLD-MCNC: 13.9 G/DL (ref 13.3–17.7)
LYMPHOCYTES # BLD AUTO: 2.7 10E3/UL (ref 0.8–5.3)
LYMPHOCYTES NFR BLD AUTO: 28 %
MCH RBC QN AUTO: 30.3 PG (ref 26.5–33)
MCHC RBC AUTO-ENTMCNC: 31.8 G/DL (ref 31.5–36.5)
MCV RBC AUTO: 95 FL (ref 78–100)
MONOCYTES # BLD AUTO: 1 10E3/UL (ref 0–1.3)
MONOCYTES NFR BLD AUTO: 11 %
NEUTROPHILS # BLD AUTO: 5.3 10E3/UL (ref 1.6–8.3)
NEUTROPHILS NFR BLD AUTO: 55 %
PLATELET # BLD AUTO: 275 10E3/UL (ref 150–450)
RBC # BLD AUTO: 4.59 10E6/UL (ref 4.4–5.9)
WBC # BLD AUTO: 9.7 10E3/UL (ref 4–11)

## 2021-10-26 PROCEDURE — 80053 COMPREHEN METABOLIC PANEL: CPT | Performed by: FAMILY MEDICINE

## 2021-10-26 PROCEDURE — 82728 ASSAY OF FERRITIN: CPT

## 2021-10-26 PROCEDURE — 83550 IRON BINDING TEST: CPT

## 2021-10-26 PROCEDURE — 83036 HEMOGLOBIN GLYCOSYLATED A1C: CPT

## 2021-10-26 PROCEDURE — 80061 LIPID PANEL: CPT | Performed by: FAMILY MEDICINE

## 2021-10-26 PROCEDURE — 36415 COLL VENOUS BLD VENIPUNCTURE: CPT

## 2021-10-26 PROCEDURE — 85025 COMPLETE CBC W/AUTO DIFF WBC: CPT

## 2021-10-27 ENCOUNTER — OFFICE VISIT (OUTPATIENT)
Dept: FAMILY MEDICINE | Facility: CLINIC | Age: 66
End: 2021-10-27
Payer: COMMERCIAL

## 2021-10-27 VITALS
SYSTOLIC BLOOD PRESSURE: 132 MMHG | DIASTOLIC BLOOD PRESSURE: 74 MMHG | RESPIRATION RATE: 16 BRPM | BODY MASS INDEX: 38.21 KG/M2 | HEART RATE: 90 BPM | TEMPERATURE: 97.8 F | WEIGHT: 281.7 LBS | OXYGEN SATURATION: 99 %

## 2021-10-27 DIAGNOSIS — E11.65 TYPE 2 DIABETES MELLITUS WITH HYPERGLYCEMIA, WITHOUT LONG-TERM CURRENT USE OF INSULIN (H): Primary | ICD-10-CM

## 2021-10-27 DIAGNOSIS — E78.5 HYPERLIPIDEMIA LDL GOAL <70: ICD-10-CM

## 2021-10-27 DIAGNOSIS — I10 HYPERTENSION GOAL BP (BLOOD PRESSURE) < 140/90: ICD-10-CM

## 2021-10-27 DIAGNOSIS — G47.33 OSA (OBSTRUCTIVE SLEEP APNEA): ICD-10-CM

## 2021-10-27 LAB
FERRITIN SERPL-MCNC: 59 NG/ML (ref 26–388)
HBA1C MFR BLD: 7.4 % (ref 0–5.6)
IRON SATN MFR SERPL: 30 % (ref 15–46)
IRON SERPL-MCNC: 91 UG/DL (ref 35–180)
TIBC SERPL-MCNC: 305 UG/DL (ref 240–430)

## 2021-10-27 PROCEDURE — 99214 OFFICE O/P EST MOD 30 MIN: CPT | Performed by: FAMILY MEDICINE

## 2021-10-27 RX ORDER — SIMVASTATIN 20 MG
20 TABLET ORAL AT BEDTIME
Qty: 90 TABLET | Refills: 3 | Status: SHIPPED | OUTPATIENT
Start: 2021-10-27 | End: 2022-10-10

## 2021-10-27 RX ORDER — VALSARTAN 160 MG/1
160 TABLET ORAL DAILY
Qty: 90 TABLET | Refills: 3 | Status: SHIPPED | OUTPATIENT
Start: 2021-10-27 | End: 2022-10-10

## 2021-10-27 NOTE — PROGRESS NOTES
Assessment & Plan     Type 2 diabetes mellitus with hyperglycemia, without long-term current use of insulin (H)  Labs ordered.  Patient is currently taking 5 diabetic agents.  He has an appointment with Endocrinology in about one week.  - Hemoglobin A1c; Future  - Comprehensive metabolic panel (BMP + Alb, Alk Phos, ALT, AST, Total. Bili, TP)    JUSTIN (obstructive sleep apnea)  Patient is complaint with use of CPAP 100% of the time and benefits from use.  Continue CPAP.    Hyperlipidemia LDL goal <70  Labs pending, continue current medication, adjustments pending results.  - simvastatin (ZOCOR) 20 MG tablet; Take 1 tablet (20 mg) by mouth At Bedtime  - Lipid panel reflex to direct LDL Fasting    Hypertension goal BP (blood pressure) < 140/90  Well controlled with Amlodipine, hydrochlorothiazide, and Valsartan.  Continue current regimen.  - valsartan (DIOVAN) 160 MG tablet; Take 1 tablet (160 mg) by mouth daily    22 minutes spent on the date of the encounter doing chart review, history and exam, documentation and further activities per the note       BMI:   Estimated body mass index is 38.21 kg/m  as calculated from the following:    Height as of 6/28/21: 1.829 m (6').    Weight as of this encounter: 127.8 kg (281 lb 11.2 oz).       See Patient Instructions    Return in about 6 months (around 4/27/2022) for Diabetes Recheck.    Jagruti Montoya MD  [facility] Reyes is a 66 year old who presents for the following health issues     HPI     Medication Followup     Taking Medication as prescribed: yes    Side Effects:  None    Medication Helping Symptoms:  yes     Here for medication refills.    For his high blood pressure:  Valsartan, hydrochlorothiazide, Amlodipine.    For Diabetes:  Pioglitazone, Jardiance, Liraglutide, Metformin, Glipizide.    For Cholesterol:  Simvastatin    Reviewed CPAP results, patient still needs to get a new replacement machine.    Current Outpatient  Medications   Medication Sig Dispense Refill     alcohol swab prep pads Use to swab area of injection/jorge as directed. 100 each 3     amLODIPine (NORVASC) 10 MG tablet Take 1 tablet (10 mg) by mouth daily 90 tablet 0     aspirin 81 MG EC tablet Take 81 mg by mouth daily       blood glucose (ACCU-CHEK SMARTVIEW) test strip Use to test blood sugar 3 times daily or as directed. 300 strip 3     blood glucose calibration (NO BRAND SPECIFIED) solution To accompany: Blood Glucose Monitor Brands: per insurance. 1 each 0     blood glucose monitoring (NO BRAND SPECIFIED) meter device kit Use to test blood sugar 3 times daily or as directed. Preferred blood glucose meter OR supplies to accompany: Blood Glucose Monitor Brands: per insurance. 1 kit 0     doxycycline hyclate (VIBRAMYCIN) 100 MG capsule TAKE 1 CAPSULE BY MOUTH 2 TIMES DAILY 180 capsule 3     empagliflozin (JARDIANCE) 25 MG TABS tablet Take 1 tablet (25 mg) by mouth daily 30 tablet 11     glipiZIDE (GLUCOTROL XL) 10 MG 24 hr tablet TAKE TWO TABLETS BY MOUTH ONCE DAILY 180 tablet 1     hydrochlorothiazide (HYDRODIURIL) 25 MG tablet TAKE ONE TABLET BY MOUTH ONCE DAILY 90 tablet 1     insulin pen needle (B-D U/F) 31G X 5 MM miscellaneous Use one pen needles daily or as directed. 100 each 1     liraglutide (VICTOZA PEN) 18 MG/3ML solution INJECT 1.8MG SUBCUTANOUSLY ONCE DAILY 9 mL 0     metFORMIN (GLUCOPHAGE) 1000 MG tablet TAKE ONE TABLET BY MOUTH TWICE A DAY WITH MEALS 180 tablet 1     order for DME Patient Francois Fontana was set up at [facility] on September 23, 2015. Patient received a [employer] Respir[employer]whales DreamStation Auto CPAP Auto. Pressures were set at Auto 6 - 12 cm H2O.   Patient s ramp is 5 cm H2O for 30 min and FLEX/EPR is A Flex.  Patient received a Alayna Respironics Mask name: Wisp  Nasal mask Size Small, Medium, Large, heated tubing and heated humidifier.  Patient is enrolled in the STM Program and does need to meet compliance. Patient has a follow up on  11/4/15 with Bennett Goltz, PA.     Myra Lim (entered by Nitin Wilson)       pioglitazone (ACTOS) 45 MG tablet TAKE ONE TABLET BY MOUTH ONCE DAILY 90 tablet 1     simvastatin (ZOCOR) 20 MG tablet Take 1 tablet (20 mg) by mouth At Bedtime 90 tablet 3     thin (NO BRAND SPECIFIED) lancets Use with lanceting device. To accompany: Blood Glucose Monitor Brands: per insurance. 200 each 3     traMADol (ULTRAM) 50 MG tablet        valsartan (DIOVAN) 160 MG tablet Take 1 tablet (160 mg) by mouth daily 90 tablet 3         Review of Systems   Constitutional, HEENT, cardiovascular, pulmonary, gi and gu systems are negative, except as otherwise noted.      Objective    /74 (BP Location: Right arm, Patient Position: Chair, Cuff Size: Adult Large)   Pulse 90   Temp 97.8  F (36.6  C) (Oral)   Resp 16   Wt 127.8 kg (281 lb 11.2 oz)   SpO2 99%   BMI 38.21 kg/m    Body mass index is 38.21 kg/m .  Physical Exam   GENERAL: healthy, alert and no distress  RESP: lungs clear to auscultation - no rales, rhonchi or wheezes  CV: regular rates and rhythm  SKIN: no suspicious lesions or rashes  PSYCH: mentation appears normal, affect normal/bright    Lab on 10/26/2021   Component Date Value Ref Range Status     WBC Count 10/26/2021 9.7  4.0 - 11.0 10e3/uL Final     RBC Count 10/26/2021 4.59  4.40 - 5.90 10e6/uL Final     Hemoglobin 10/26/2021 13.9  13.3 - 17.7 g/dL Final     Hematocrit 10/26/2021 43.7  40.0 - 53.0 % Final     MCV 10/26/2021 95  78 - 100 fL Final     MCH 10/26/2021 30.3  26.5 - 33.0 pg Final     MCHC 10/26/2021 31.8  31.5 - 36.5 g/dL Final     RDW 10/26/2021 14.6  10.0 - 15.0 % Final     Platelet Count 10/26/2021 275  150 - 450 10e3/uL Final     % Neutrophils 10/26/2021 55  % Final     % Lymphocytes 10/26/2021 28  % Final     % Monocytes 10/26/2021 11  % Final     % Eosinophils 10/26/2021 6  % Final     % Basophils 10/26/2021 1  % Final     Absolute Neutrophils 10/26/2021 5.3  1.6 - 8.3 10e3/uL Final      Absolute Lymphocytes 10/26/2021 2.7  0.8 - 5.3 10e3/uL Final     Absolute Monocytes 10/26/2021 1.0  0.0 - 1.3 10e3/uL Final     Absolute Eosinophils 10/26/2021 0.5  0.0 - 0.7 10e3/uL Final     Absolute Basophils 10/26/2021 0.1  0.0 - 0.2 10e3/uL Final

## 2021-10-28 LAB
ALBUMIN SERPL-MCNC: 3.6 G/DL (ref 3.4–5)
ALP SERPL-CCNC: 62 U/L (ref 40–150)
ALT SERPL W P-5'-P-CCNC: 36 U/L (ref 0–70)
ANION GAP SERPL CALCULATED.3IONS-SCNC: 9 MMOL/L (ref 3–14)
AST SERPL W P-5'-P-CCNC: 15 U/L (ref 0–45)
BILIRUB SERPL-MCNC: 0.3 MG/DL (ref 0.2–1.3)
BUN SERPL-MCNC: 39 MG/DL (ref 7–30)
CALCIUM SERPL-MCNC: 9.2 MG/DL (ref 8.5–10.1)
CHLORIDE BLD-SCNC: 109 MMOL/L (ref 94–109)
CHOLEST SERPL-MCNC: 102 MG/DL
CO2 SERPL-SCNC: 19 MMOL/L (ref 20–32)
CREAT SERPL-MCNC: 1.22 MG/DL (ref 0.66–1.25)
GFR SERPL CREATININE-BSD FRML MDRD: 62 ML/MIN/1.73M2
GLUCOSE BLD-MCNC: 95 MG/DL (ref 70–99)
HDLC SERPL-MCNC: 43 MG/DL
LDLC SERPL CALC-MCNC: 32 MG/DL
NONHDLC SERPL-MCNC: 59 MG/DL
POTASSIUM BLD-SCNC: 4.9 MMOL/L (ref 3.4–5.3)
PROT SERPL-MCNC: 6.9 G/DL (ref 6.8–8.8)
SODIUM SERPL-SCNC: 137 MMOL/L (ref 133–144)
TRIGL SERPL-MCNC: 137 MG/DL

## 2021-10-30 DIAGNOSIS — E11.65 TYPE 2 DIABETES MELLITUS WITH HYPERGLYCEMIA, WITHOUT LONG-TERM CURRENT USE OF INSULIN (H): ICD-10-CM

## 2021-11-01 RX ORDER — LIRAGLUTIDE 6 MG/ML
INJECTION SUBCUTANEOUS
Qty: 9 ML | Refills: 1 | Status: SHIPPED | OUTPATIENT
Start: 2021-11-01 | End: 2022-01-11

## 2021-11-03 ENCOUNTER — OFFICE VISIT (OUTPATIENT)
Dept: ENDOCRINOLOGY | Facility: CLINIC | Age: 66
End: 2021-11-03
Attending: FAMILY MEDICINE
Payer: COMMERCIAL

## 2021-11-03 ENCOUNTER — TELEPHONE (OUTPATIENT)
Dept: ENDOCRINOLOGY | Facility: CLINIC | Age: 66
End: 2021-11-03

## 2021-11-03 VITALS
SYSTOLIC BLOOD PRESSURE: 133 MMHG | HEIGHT: 72 IN | WEIGHT: 278.1 LBS | DIASTOLIC BLOOD PRESSURE: 67 MMHG | HEART RATE: 85 BPM | BODY MASS INDEX: 37.67 KG/M2 | TEMPERATURE: 97.9 F | RESPIRATION RATE: 16 BRPM | OXYGEN SATURATION: 98 %

## 2021-11-03 DIAGNOSIS — E11.65 TYPE 2 DIABETES MELLITUS WITH HYPERGLYCEMIA, WITHOUT LONG-TERM CURRENT USE OF INSULIN (H): ICD-10-CM

## 2021-11-03 PROCEDURE — 99214 OFFICE O/P EST MOD 30 MIN: CPT | Performed by: INTERNAL MEDICINE

## 2021-11-03 ASSESSMENT — MIFFLIN-ST. JEOR: SCORE: 2084.45

## 2021-11-03 NOTE — TELEPHONE ENCOUNTER
Diabetes Education Scheduling Outreach #1:    Call to patient to schedule. Left message with phone number to call to schedule.    Plan for 2nd outreach attempt within 1 week.    Louisa Razo  [place] [facility]  Diabetes and Nutrition Scheduling

## 2021-11-03 NOTE — PROGRESS NOTES
ENDOCRINOLOGY CLINIC NOTE:  Name: Francois Fontana  Seen in consultation with Jagruti Figueredo for Diabetes.  HPI:  Francois Fontana is a 66 year old male who presents for the evaluation/management of Diabetes.   has a past medical history of Diabetes mellitus, type 2 (H), Hyperlipidemia, Rosacea, Stenosis of left subclavian artery (H), Type II or unspecified type diabetes mellitus without mention of complication, not stated as uncontrolled, and Unspecified essential hypertension.   Was seen by  and Sherri Mayer before.    He is not checking BG.  He is now going to gym.  He had shoulder surgery for rotator cuff injury.  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  He is resistance to take insulin.  A1c improved.  Reports that he is focusing more on activity and is also making better food choices    Wt Readings from Last 2 Encounters:   11/03/21 126.1 kg (278 lb 1.6 oz)   10/27/21 127.8 kg (281 lb 11.2 oz)       1. Type 2 DM:  Orginally diagnosed at the age of: Original diagnosis in 1990.   Current Regimen:   yes:     Diabetes Medication(s)     Biguanides       metFORMIN (GLUCOPHAGE) 1000 MG tablet    TAKE ONE TABLET BY MOUTH TWICE A DAY WITH MEALS    Sodium-Glucose Co-Transporter 2 (SGLT2) Inhibitors       empagliflozin (JARDIANCE) 25 MG TABS tablet    Take 1 tablet (25 mg) by mouth daily    Sulfonylureas       glipiZIDE (GLUCOTROL XL) 10 MG 24 hr tablet    TAKE TWO TABLETS BY MOUTH ONCE DAILY    Insulin Sensitizing Agents       pioglitazone (ACTOS) 45 MG tablet    TAKE ONE TABLET BY MOUTH ONCE DAILY    Incretin Mimetic Agents (GLP-1 Receptor Agonists)       VICTOZA PEN 18 MG/3ML soln    INJECT 1.8MG SUBCUTANOUSLY ONCE DAILY          BS checks: not checking BG  Average Meter Download: Patient is not checking BG. Without Blood sugar data it is difficult to make adjustment to medical regimen.     Exercise: goes to gym  Last A1c:  Symptoms of hypoglycemia (low blood  sugar):  Gets symptoms of hypoglycemia.  Episodes of hypoglycemia:    DM Complications:   Complications:   Diabetes Complications  Description / Detail    Diabetic Retinopathy  No   CAD / PAD  No   Neuropathy  No   Nephropathy / Microalbuminuria     + microalbuminuria  Lab Results   Component Value Date    UMALCR 68.50 2021         Gastroparesis  No   Hypoglycemia [place]  No         2. Hypertension:    on medications  3. Hyperlipidemia: on medications    PMH/PSH:  Past Medical History:   Diagnosis Date     Diabetes mellitus, type 2 (H)      Hyperlipidemia      Rosacea      Stenosis of left subclavian artery (H)      Type II or unspecified type diabetes mellitus without mention of complication, not stated as uncontrolled     Abstracted 02     Unspecified essential hypertension      Past Surgical History:   Procedure Laterality Date     HERNIA REPAIR, INGUINAL RT/LT      right     Family Hx:  Family History   Problem Relation Age of Onset     Cancer Father          of lung cancer     C.A.D. Father         Mult bypass operations     Diabetes Type 2  Father      Prostate Cancer Father      Respiratory Mother          of COPD     Gastrointestinal Disease Brother         Hemochromatosis     Gastrointestinal Disease Brother         Hemochromatosis     Gastrointestinal Disease Brother         Hemochromatosis ( in 3 bros)       Diabetes: Father    Social Hx:  Social History     Socioeconomic History     Marital status:      Spouse name: Not on file     Number of children: Not on file     Years of education: Not on file     Highest education level: Not on file   Occupational History     Not on file   Tobacco Use     Smoking status: Former Smoker     Quit date: 1985     Years since quittin.3     Smokeless tobacco: Never Used     Tobacco comment: quit    Vaping Use     Vaping Use: Never used   Substance and Sexual Activity     Alcohol use: No     Alcohol/week: 0.0 standard drinks      Comment: quit drinking age 30     Drug use: No     Sexual activity: Yes     Partners: Female   Other Topics Concern     Parent/sibling w/ CABG, MI or angioplasty before 65F 55M? Yes   Social History Narrative     Not on file     Social Determinants of Health     Financial Resource Strain:      Difficulty of Paying Living Expenses:    Food Insecurity:      Worried About Running Out of Food in the Last Year:      Ran Out of Food in the Last Year:    Transportation Needs:      Lack of Transportation (Medical):      Lack of Transportation (Non-Medical):    Physical Activity:      Days of Exercise per Week:      Minutes of Exercise per Session:    Stress:      Feeling of Stress :    Social Connections:      Frequency of Communication with Friends and Family:      Frequency of Social Gatherings with Friends and Family:      Attends Tenriism Services:      Active Member of Clubs or Organizations:      Attends Club or Organization Meetings:      Marital Status:    Intimate Partner Violence:      Fear of Current or Ex-Partner:      Emotionally Abused:      Physically Abused:      Sexually Abused:           MEDICATIONS:  has a current medication list which includes the following prescription(s): alcohol swab, amlodipine, aspirin, accu-chek smartview, blood glucose calibration, blood glucose monitoring, doxycycline hyclate, empagliflozin, glipizide, hydrochlorothiazide, b-d u/f, metformin, order for dme, pioglitazone, simvastatin, thin, tramadol, valsartan, and victoza pen.    ROS     ROS: 10 point ROS neg other than the symptoms noted above in the HPI.    Physical Exam   VS: /67 (BP Location: Right arm, Patient Position: Chair, Cuff Size: Adult Large)   Pulse 85   Temp 97.9  F (36.6  C) (Oral)   Resp 16   Ht 1.829 m (6')   Wt 126.1 kg (278 lb 1.6 oz)   SpO2 98%   BMI 37.72 kg/m    GENERAL: healthy, alert and no distress  EYES: Eyes grossly normal to inspection, conjunctivae and sclerae normal  ENT: no nose  swelling, nasal discharge.  Thyroid: no apparent thyroid nodules.  Thyroid appears normal in size and nontender.  CV: RRR, no rubs, gallops, no murmurs  RESP: CTAB, no wheezes, rales, or ronchi  ABDO: +BS  EXTREMITIES: no hand tremors.  NEURO: Cranial nerves grossly intact, mentation intact and speech normal  SKIN: No apparent skin lesions, rash or edema seen   PSYCH: mentation appears normal, affect normal/bright, judgement and insight intact, normal speech and appearance well-groomed      LABS:  A1c:  Lab Results   Component Value Date    A1C 7.4 10/26/2021    A1C 8.6 06/10/2021    A1C 10.4 05/19/2021    A1C 12.0 04/29/2021    A1C 13.1 04/08/2021    A1C 9.4 07/01/2020       Creatinine:  Creatinine   Date Value Ref Range Status   10/26/2021 1.22 0.66 - 1.25 mg/dL Final   06/10/2021 1.34 (H) 0.66 - 1.25 mg/dL Final   ]    Urine Micro:  Lab Results   Component Value Date    MICROL 91 06/28/2021     No results found for: MICROALBUMIN      LFTs/Lipids:  Lab Results   Component Value Date    CHOL 102 10/26/2021    CHOL 94 07/22/2020     Lab Results   Component Value Date    HDL 43 10/26/2021    HDL 41 07/22/2020     Lab Results   Component Value Date    LDL 32 10/26/2021    LDL 32 07/22/2020     Lab Results   Component Value Date    TRIG 137 10/26/2021    TRIG 107 07/22/2020     Lab Results   Component Value Date    CHOLHDLRATIO 3.7 11/17/2015       TFTs:  TSH   Date Value Ref Range Status   04/08/2021 1.51 0.40 - 4.00 mU/L Final   ]        All pertinent notes, labs, and images personally reviewed by me.     Glucometer/ insulin pump (if applicable)/ CGM data (if applicable) downloaded, Personally reviewed and interpreted.  All Blood sugar data reviewed personally and discussed with pt.  See nursing note from 11/3/2021 for details of BG/CGM log.      A/P  Mr.Raymond COLETTE Fontana is a 66 year old here for the evaluation/management of diabetes:    1. DM2 - Under Fair control.  A1c 7.4%.  A1c improved to 7.4%.  Diabetes  complicated by microalbuminuria.  Plan:  Discussed diagnosis, pathophysiology, management and treatment options of condition with pt.  I also discussed importance of strict blood sugar control to prevent complications associated with uncontrolled diabetes.  In general blood sugars are improving and A1c is improved as noted above with lifestyle management-he is focusing on diet, trying to eat healthy and is also increased activity.  In the setting of improved blood sugar data plan to continue current regimen.  Recommend diagnostic CGM. CDE referral in place.  Also discussed personal laurel- he will think about it.  Labs and follow up in 3 months.  Follow up with eye doctor.    2. Hypertension - Under Good control. On hydrochlorothiazide 25 mg, amlodipine 10 mg and valsartan 160 mg.  + microabuminuria: on Valsartan.    3. Hyperlipidemia - Under Good control. On simvastatin 20 mg. LDL 32.    4. Prevention:  Opthalmology- 8/2020- no  Recommend annually.  ASA- 81 mg/day  Smoking- former smoker    Most Recent Immunizations   Administered Date(s) Administered     COVID-19,PF,Pfizer (12+ Yrs) 10/30/2021     Influenza (IIV3) PF 08/23/2014     Influenza (intradermal) 10/29/2018     Influenza Quad, Recombinant, pf(RIV4) (Flublok) 10/05/2020     Influenza Vaccine IM > 6 months Valent IIV4 (Alfuria,Fluzone) 09/15/2016     Influenza, Quad, High Dose, Pf, 65yr+ (Fluzone HD) 09/11/2021     Pneumococcal 23 valent 11/02/2006     TDAP Vaccine (Adacel) 10/20/2008     Td (Adult), Adsorbed 03/13/2019     Tdap (Adacel,Boostrix) 10/20/2008         Recommend checking blood sugars before meals and at bedtime.    If Blood glucose are low more often-> 2-3 times/week- give us a call.  The patient is advised to Make better food choices: reduce carbs, Reduce portion size, weight loss and exercise 3-4 times a week.  Discussed hypoglycemia signs and symptoms as well as management in detail.    There is some variability among people, most will  usually develop symptoms suggestive of hypoglycemia when blood glucose levels are lowered to the mid 60's. The first set of symptoms are called adrenergic. Patients may experience any of the following nervousness, sweating, intense hunger, trembling, weakness, palpitations, and difficulty speaking.   The acute management of hypoglycemia involves the rapid delivery of a source of easily absorbed sugar. Regular soda, juice, lifesavers, table sugar, are good options. 15 grams of glucose is the dose that is given, followed by an assessment of symptoms and a blood glucose check if possible. If after 10 minutes there is no improvement, another 10-15 grams should be given. This can be repeated up to three times. The equivalency of 10-15 grams of glucose (approximate servings) are: Four lifesavers, 4 teaspoons of sugar, or 1/2 can of regular soda or juice.     All questions were answered.  The patient indicates understanding of the above issues and agrees with the plan set forth.  Total time spent in with the patient evaluation:  20 min    Additional time spent reviewing pertinent lab tests and chart notes, and documentation:  10 min       Follow-up:  3 months.    Maryam Doshi M.D  Endocrinology  [facility]  CC: Jagruti Figueredo    Disclaimer: This note consists of symbols derived from keyboarding, dictation and/or voice recognition software. As a result, there may be errors in the script that have gone undetected. Please consider this when interpreting information found in this chart.    Addendum to above note and clinic visit:    Labs reviewed.    See result note/telephone encounter.

## 2021-11-03 NOTE — NURSING NOTE
ENDOCRINOLOGY INTAKE FORM    Patient Name:  Francois Fontana  :  1955    Is patient Diabetic?   Yes: type 2  Does patient have non-diabetic or other endocrine issues?  Yes:   Morbid obesity due to excess calories (H)    Hyperlipidemia LDL goal <70    Vitals: There were no vitals taken for this visit.  BMI= There is no height or weight on file to calculate BMI.    Flu vaccine:  Yes:   Pneumonia vaccine:  Yes: pneumovax    Smoking and Alcohol use:  Social History     Tobacco Use     Smoking status: Former Smoker     Quit date: 1985     Years since quittin.3     Smokeless tobacco: Never Used     Tobacco comment: quit    Vaping Use     Vaping Use: Never used   Substance Use Topics     Alcohol use: No     Alcohol/week: 0.0 standard drinks     Comment: quit drinking age 30     Drug use: No       Foot Exam: Yes: 22  Eye Exam:  No  Dental Exam:  Yes: last Thursday  Aspirin Use:  Yes: 81 mg    Lab Results   Component Value Date    A1C 7.4 10/26/2021    A1C 8.6 06/10/2021    A1C 10.4 2021    A1C 12.0 2021    A1C 13.1 2021    A1C 9.4 2020       Lab Results   Component Value Date    MICROL 91 2021     No results found for: MICROALBUMIN    Kimberlee Poole [facility] Endocrinology [place]  [phone]

## 2021-11-03 NOTE — LETTER
11/3/2021         RE: Francois Fontana  [address] [facility] [address]        Dear Colleague,    Thank you for referring your patient, Francois Fontana, to the [facility]. Please see a copy of my visit note below.    ENDOCRINOLOGY CLINIC NOTE:  Name: Francois Fontana  Seen in consultation with Jagruti Figueredo for Diabetes.  HPI:  Francois Fontana is a 66 year old male who presents for the evaluation/management of Diabetes.   has a past medical history of Diabetes mellitus, type 2 (H), Hyperlipidemia, Rosacea, Stenosis of left subclavian artery (H), Type II or unspecified type diabetes mellitus without mention of complication, not stated as uncontrolled, and Unspecified essential hypertension.   Was seen by  and Sherri Mayer before.    He is not checking BG.  He is now going to gym.  He had shoulder surgery for rotator cuff injury.  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  He is resistance to take insulin.  A1c improved.  Reports that he is focusing more on activity and is also making better food choices    Wt Readings from Last 2 Encounters:   11/03/21 126.1 kg (278 lb 1.6 oz)   10/27/21 127.8 kg (281 lb 11.2 oz)       1. Type 2 DM:  Orginally diagnosed at the age of: Original diagnosis in 1990.   Current Regimen:   yes:     Diabetes Medication(s)     Biguanides       metFORMIN (GLUCOPHAGE) 1000 MG tablet    TAKE ONE TABLET BY MOUTH TWICE A DAY WITH MEALS    Sodium-Glucose Co-Transporter 2 (SGLT2) Inhibitors       empagliflozin (JARDIANCE) 25 MG TABS tablet    Take 1 tablet (25 mg) by mouth daily    Sulfonylureas       glipiZIDE (GLUCOTROL XL) 10 MG 24 hr tablet    TAKE TWO TABLETS BY MOUTH ONCE DAILY    Insulin Sensitizing Agents       pioglitazone (ACTOS) 45 MG tablet    TAKE ONE TABLET BY MOUTH ONCE DAILY    Incretin Mimetic Agents (GLP-1 Receptor Agonists)       VICTOZA PEN 18 MG/3ML soln    INJECT 1.8MG  SUBCUTANOUSLY ONCE DAILY          BS checks: not checking BG  Average Meter Download: Patient is not checking BG. Without Blood sugar data it is difficult to make adjustment to medical regimen.     Exercise: goes to gym  Last A1c:  Symptoms of hypoglycemia (low blood sugar):  Gets symptoms of hypoglycemia.  Episodes of hypoglycemia:    DM Complications:   Complications:   Diabetes Complications  Description / Detail    Diabetic Retinopathy  No   CAD / PAD  No   Neuropathy  No   Nephropathy / Microalbuminuria     + microalbuminuria  Lab Results   Component Value Date    UMALCR 68.50 2021         Gastroparesis  No   Hypoglycemia [place]  No         2. Hypertension:    on medications  3. Hyperlipidemia: on medications    PMH/PSH:  Past Medical History:   Diagnosis Date     Diabetes mellitus, type 2 (H)      Hyperlipidemia      Rosacea      Stenosis of left subclavian artery (H)      Type II or unspecified type diabetes mellitus without mention of complication, not stated as uncontrolled     Abstracted 02     Unspecified essential hypertension      Past Surgical History:   Procedure Laterality Date     HERNIA REPAIR, INGUINAL RT/LT      right     Family Hx:  Family History   Problem Relation Age of Onset     Cancer Father          of lung cancer     C.A.D. Father         Mult bypass operations     Diabetes Type 2  Father      Prostate Cancer Father      Respiratory Mother          of COPD     Gastrointestinal Disease Brother         Hemochromatosis     Gastrointestinal Disease Brother         Hemochromatosis     Gastrointestinal Disease Brother         Hemochromatosis ( in 3 bros)       Diabetes: Father    Social Hx:  Social History     Socioeconomic History     Marital status:      Spouse name: Not on file     Number of children: Not on file     Years of education: Not on file     Highest education level: Not on file   Occupational History     Not on file   Tobacco Use     Smoking  status: Former Smoker     Quit date: 1985     Years since quittin.3     Smokeless tobacco: Never Used     Tobacco comment: quit    Vaping Use     Vaping Use: Never used   Substance and Sexual Activity     Alcohol use: No     Alcohol/week: 0.0 standard drinks     Comment: quit drinking age 30     Drug use: No     Sexual activity: Yes     Partners: Female   Other Topics Concern     Parent/sibling w/ CABG, MI or angioplasty before 65F 55M? Yes   Social History Narrative     Not on file     Social Determinants of Health     Financial Resource Strain:      Difficulty of Paying Living Expenses:    Food Insecurity:      Worried About Running Out of Food in the Last Year:      Ran Out of Food in the Last Year:    Transportation Needs:      Lack of Transportation (Medical):      Lack of Transportation (Non-Medical):    Physical Activity:      Days of Exercise per Week:      Minutes of Exercise per Session:    Stress:      Feeling of Stress :    Social Connections:      Frequency of Communication with Friends and Family:      Frequency of Social Gatherings with Friends and Family:      Attends Judaism Services:      Active Member of Clubs or Organizations:      Attends Club or Organization Meetings:      Marital Status:    Intimate Partner Violence:      Fear of Current or Ex-Partner:      Emotionally Abused:      Physically Abused:      Sexually Abused:           MEDICATIONS:  has a current medication list which includes the following prescription(s): alcohol swab, amlodipine, aspirin, accu-chek smartview, blood glucose calibration, blood glucose monitoring, doxycycline hyclate, empagliflozin, glipizide, hydrochlorothiazide, b-d u/f, metformin, order for dme, pioglitazone, simvastatin, thin, tramadol, valsartan, and victoza pen.    ROS     ROS: 10 point ROS neg other than the symptoms noted above in the HPI.    Physical Exam   VS: /67 (BP Location: Right arm, Patient Position: Chair, Cuff Size: Adult  Large)   Pulse 85   Temp 97.9  F (36.6  C) (Oral)   Resp 16   Ht 1.829 m (6')   Wt 126.1 kg (278 lb 1.6 oz)   SpO2 98%   BMI 37.72 kg/m    GENERAL: healthy, alert and no distress  EYES: Eyes grossly normal to inspection, conjunctivae and sclerae normal  ENT: no nose swelling, nasal discharge.  Thyroid: no apparent thyroid nodules.  Thyroid appears normal in size and nontender.  CV: RRR, no rubs, gallops, no murmurs  RESP: CTAB, no wheezes, rales, or ronchi  ABDO: +BS  EXTREMITIES: no hand tremors.  NEURO: Cranial nerves grossly intact, mentation intact and speech normal  SKIN: No apparent skin lesions, rash or edema seen   PSYCH: mentation appears normal, affect normal/bright, judgement and insight intact, normal speech and appearance well-groomed      LABS:  A1c:  Lab Results   Component Value Date    A1C 7.4 10/26/2021    A1C 8.6 06/10/2021    A1C 10.4 05/19/2021    A1C 12.0 04/29/2021    A1C 13.1 04/08/2021    A1C 9.4 07/01/2020       Creatinine:  Creatinine   Date Value Ref Range Status   10/26/2021 1.22 0.66 - 1.25 mg/dL Final   06/10/2021 1.34 (H) 0.66 - 1.25 mg/dL Final   ]    Urine Micro:  Lab Results   Component Value Date    MICROL 91 06/28/2021     No results found for: MICROALBUMIN      LFTs/Lipids:  Lab Results   Component Value Date    CHOL 102 10/26/2021    CHOL 94 07/22/2020     Lab Results   Component Value Date    HDL 43 10/26/2021    HDL 41 07/22/2020     Lab Results   Component Value Date    LDL 32 10/26/2021    LDL 32 07/22/2020     Lab Results   Component Value Date    TRIG 137 10/26/2021    TRIG 107 07/22/2020     Lab Results   Component Value Date    CHOLHDLRATIO 3.7 11/17/2015       TFTs:  TSH   Date Value Ref Range Status   04/08/2021 1.51 0.40 - 4.00 mU/L Final   ]        All pertinent notes, labs, and images personally reviewed by me.     Glucometer/ insulin pump (if applicable)/ CGM data (if applicable) downloaded, Personally reviewed and interpreted.  All Blood sugar data  reviewed personally and discussed with pt.  See nursing note from 11/3/2021 for details of BG/CGM log.      A/P  Mr.Raymond COLETTE Fontana is a 66 year old here for the evaluation/management of diabetes:    1. DM2 - Under Fair control.  A1c 7.4%.  A1c improved to 7.4%.  Diabetes complicated by microalbuminuria.  Plan:  Discussed diagnosis, pathophysiology, management and treatment options of condition with pt.  I also discussed importance of strict blood sugar control to prevent complications associated with uncontrolled diabetes.  In general blood sugars are improving and A1c is improved as noted above with lifestyle management-he is focusing on diet, trying to eat healthy and is also increased activity.  In the setting of improved blood sugar data plan to continue current regimen.  Recommend diagnostic CGM. CDE referral in place.  Also discussed personal laurel- he will think about it.  Labs and follow up in 3 months.  Follow up with eye doctor.    2. Hypertension - Under Good control. On hydrochlorothiazide 25 mg, amlodipine 10 mg and valsartan 160 mg.  + microabuminuria: on Valsartan.    3. Hyperlipidemia - Under Good control. On simvastatin 20 mg. LDL 32.    4. Prevention:  Opthalmology- 8/2020- no  Recommend annually.  ASA- 81 mg/day  Smoking- former smoker    Most Recent Immunizations   Administered Date(s) Administered     COVID-19,PF,Pfizer (12+ Yrs) 10/30/2021     Influenza (IIV3) PF 08/23/2014     Influenza (intradermal) 10/29/2018     Influenza Quad, Recombinant, pf(RIV4) (Flublok) 10/05/2020     Influenza Vaccine IM > 6 months Valent IIV4 (Alfuria,Fluzone) 09/15/2016     Influenza, Quad, High Dose, Pf, 65yr+ (Fluzone HD) 09/11/2021     Pneumococcal 23 valent 11/02/2006     TDAP Vaccine (Adacel) 10/20/2008     Td (Adult), Adsorbed 03/13/2019     Tdap (Adacel,Boostrix) 10/20/2008         Recommend checking blood sugars before meals and at bedtime.    If Blood glucose are low more often-> 2-3 times/week- give  us a call.  The patient is advised to Make better food choices: reduce carbs, Reduce portion size, weight loss and exercise 3-4 times a week.  Discussed hypoglycemia signs and symptoms as well as management in detail.    There is some variability among people, most will usually develop symptoms suggestive of hypoglycemia when blood glucose levels are lowered to the mid 60's. The first set of symptoms are called adrenergic. Patients may experience any of the following nervousness, sweating, intense hunger, trembling, weakness, palpitations, and difficulty speaking.   The acute management of hypoglycemia involves the rapid delivery of a source of easily absorbed sugar. Regular soda, juice, lifesavers, table sugar, are good options. 15 grams of glucose is the dose that is given, followed by an assessment of symptoms and a blood glucose check if possible. If after 10 minutes there is no improvement, another 10-15 grams should be given. This can be repeated up to three times. The equivalency of 10-15 grams of glucose (approximate servings) are: Four lifesavers, 4 teaspoons of sugar, or 1/2 can of regular soda or juice.     All questions were answered.  The patient indicates understanding of the above issues and agrees with the plan set forth.  Total time spent in with the patient evaluation:  20 min    Additional time spent reviewing pertinent lab tests and chart notes, and documentation:  10 min       Follow-up:  3 months.    Maryam Doshi M.D  Endocrinology  [facility]  CC: Jagruti Figueredo    Disclaimer: This note consists of symbols derived from keyboarding, dictation and/or voice recognition software. As a result, there may be errors in the script that have gone undetected. Please consider this when interpreting information found in this chart.    Addendum to above note and clinic visit:    Labs reviewed.    See result note/telephone encounter.            Again, thank you for allowing  me to participate in the care of your patient.        Sincerely,        Maryam Doshi MD

## 2021-11-03 NOTE — PATIENT INSTRUCTIONS
[facility]  Dr Doshi, Endocrinology Department    [facility]   [id number]  [place] [address]  Appointment [phone]  Fax: [phone]  [place]: Monday - Thursday      Please check the cost coverage and copay with insurance before recommended tests, services and medications (especially if new medications are prescribed).     If ordered, please get blood work done 1 week prior to your next appointment so they will be available to Dr. Doshi at your visit.    To provide the best diabetic care, please bring your blood glucose meter to each and every visit with your  Endocrinologist. Your blood glucose CGM/meter/insulin pump will be downloaded at every appointment.  Please arrive 15 minutes before your scheduled appointment. This will allow for your blood glucose CGM/meter/insulin pump  to be downloaded.  If you are wearing DEXCOM please bring  or sharing code from the Dexcom Clarity Appt so that it can be downloaded.  If you are using freestyle laurel personal sensors please bring the reader.  If you are using TANDEM insulin pump please have your username and password to get info from Tandem website.    CONTINUE current regimen for Diabetes.  Your provider has referred you to Diabetes Education: For all [facility]:  Phone [phone]; Fax [phone]  Please call and make the appointment.--> 15 day DIAGNOSTIC continuous glucose monitor placement and follow up.  Labs and follow up in 3 months.  Follow up with eye doctor.    Check Blood glucose fasting every day and before lunch/dinner/bedtime on alternate days.  If Blood glucose are low more often-> 2-3 times/week- give us a call.  The patient is advised to Make better food choices: reduce carbs, Reduce portion size, weight loss and exercise 3-4 times a week.  Discussed hypoglycemia signs and symptoms as well as management in detail.

## 2021-11-12 NOTE — TELEPHONE ENCOUNTER
Diabetes Education Scheduling Outreach #2:    Sent Semanticator message.    Jalyn Edwards OnCall  Diabetes and Nutrition Scheduling

## 2021-11-24 ENCOUNTER — ALLIED HEALTH/NURSE VISIT (OUTPATIENT)
Dept: EDUCATION SERVICES | Facility: CLINIC | Age: 66
End: 2021-11-24
Attending: INTERNAL MEDICINE
Payer: COMMERCIAL

## 2021-11-24 DIAGNOSIS — E11.65 TYPE 2 DIABETES MELLITUS WITH HYPERGLYCEMIA, WITHOUT LONG-TERM CURRENT USE OF INSULIN (H): ICD-10-CM

## 2021-11-24 PROCEDURE — G0108 DIAB MANAGE TRN  PER INDIV: HCPCS

## 2021-11-24 NOTE — PROGRESS NOTES
Diabetes Self-Management Education & Support    Presents for: Individual review    SUBJECTIVE/OBJECTIVE:  Presents for: Individual review  Accompanied by: Self  Diabetes education in the past 24mo: No  Focus of Visit: Patient Unsure  Diabetes type: Type 2  Disease course: Improving  Transportation concerns: No  Difficulty affording diabetes medication?: No  Difficulty affording diabetes testing supplies?: No  Other concerns:: None  Cultural Influences/Ethnic Background:  American    Diabetes Symptoms & Complications:  Fatigue: No  Neuropathy: No  Polydipsia: No  Polyphagia: No  Polyuria: No  Visual change: No  Slow healing wounds: No  Complications assessed today?: Yes  Autonomic neuropathy: No  CVA: No  Heart disease: No  Nephropathy: No  Peripheral neuropathy: No  Peripheral Vascular Disease: No  Retinopathy: No    Patient Problem List and Family Medical History reviewed for relevant medical history, current medical status, and diabetes risk factors.    Vitals:  There were no vitals taken for this visit.  Estimated body mass index is 37.72 kg/m  as calculated from the following:    Height as of 11/3/21: 1.829 m (6').    Weight as of 11/3/21: 126.1 kg (278 lb 1.6 oz).   Last 3 BP:   BP Readings from Last 3 Encounters:   11/03/21 133/67   10/27/21 132/74   06/28/21 (!) 144/73       History   Smoking Status     Former Smoker     Quit date: 7/22/1985   Smokeless Tobacco     Never Used     Comment: quit 1985       Labs:  Lab Results   Component Value Date    A1C 7.4 10/26/2021    A1C 8.6 06/10/2021     Lab Results   Component Value Date    GLC 95 10/26/2021    GLC 82 06/10/2021     Lab Results   Component Value Date    LDL 32 10/26/2021    LDL 32 07/22/2020     HDL Cholesterol   Date Value Ref Range Status   07/22/2020 41 >39 mg/dL Final     Direct Measure HDL   Date Value Ref Range Status   10/26/2021 43 >=40 mg/dL Final   ]  GFR Estimate   Date Value Ref Range Status   10/26/2021 62 >60 mL/min/1.73m2 Final      Comment:     As of July 11, 2021, eGFR is calculated by the CKD-EPI creatinine equation, without race adjustment. eGFR can be influenced by muscle mass, exercise, and diet. The reported eGFR is an estimation only and is only applicable if the renal function is stable.   06/10/2021 55 (L) >60 mL/min/[1.73_m2] Final     Comment:     Non  GFR Calc  Starting 12/18/2018, serum creatinine based estimated GFR (eGFR) will be   calculated using the Chronic Kidney Disease Epidemiology Collaboration   (CKD-EPI) equation.       GFR Estimate If Black   Date Value Ref Range Status   06/10/2021 64 >60 mL/min/[1.73_m2] Final     Comment:      GFR Calc  Starting 12/18/2018, serum creatinine based estimated GFR (eGFR) will be   calculated using the Chronic Kidney Disease Epidemiology Collaboration   (CKD-EPI) equation.       Lab Results   Component Value Date    CR 1.22 10/26/2021    CR 1.34 06/10/2021     No results found for: MICROALBUMIN    Healthy Eating:  Healthy Eating Assessed Today: Yes  Breakfast: 2 cups oatmeal, butter, milk, apple sauce OR 6 scrambled eggs, sausage, green, pepper, onion  Lunch: whatever is around  Dinner: salad OR broccoli  Snacks: ham and cheese  Other: adds hot sauce to everything  Beverages: Water,Coffee,Milk,Diet soda  Has patient met with a dietitian in the past?: No    Being Active:  Being Active Assessed Today: Yes  Exercise:: Yes (physical therapy)  Days per week of moderate to strenuous exercise (like a brisk walk): 2  How intense was your typical exercise? : Light (like stretching or slow walking)  Barrier to exercise: Physical limitation    Monitoring:  Monitoring Assessed Today: Yes  Did patient bring glucose meter to appointment? : No  Times checking blood sugar at home (number): Other (rarely)    Taking Medications:  Diabetes Medication(s)     Biguanides       metFORMIN (GLUCOPHAGE) 1000 MG tablet    Take 1 tablet (1,000 mg) by mouth 2 times daily (with meals)     Sodium-Glucose Co-Transporter 2 (SGLT2) Inhibitors       empagliflozin (JARDIANCE) 25 MG TABS tablet    Take 1 tablet (25 mg) by mouth daily    Sulfonylureas       glipiZIDE (GLUCOTROL XL) 10 MG 24 hr tablet    TAKE TWO TABLETS BY MOUTH ONCE DAILY    Insulin Sensitizing Agents       pioglitazone (ACTOS) 45 MG tablet    TAKE ONE TABLET BY MOUTH ONCE DAILY    Incretin Mimetic Agents (GLP-1 Receptor Agonists)       VICTOZA PEN 18 MG/3ML soln    INJECT 1.8MG SUBCUTANOUSLY ONCE DAILY          Taking Medication Assessed Today: Yes  Current Treatments: Non-insulin Injectables,Oral Medication (taken by mouth)  Dose schedule: Pre-dinner,Pre-breakfast  Given by: Patient  Injection/Infusion sites: Abdomen  Problems taking diabetes medications regularly?: No  Diabetes medication side effects?: No    Problem Solving:  Problem Solving Assessed Today: Yes  Is the patient at risk for hypoglycemia?: Yes  Hypoglycemia Frequency: Never              Reducing Risks:  Reducing Risks Assessed Today: Yes  Diabetes Risks: Sedentary Lifestyle,Family History,Age over 45 years  CAD Risks: Diabetes Mellitus,Sedentary lifestyle,Male sex,Family history,Obesity,Hypertension  Has dilated eye exam at least once a year?: Yes  Sees dentist every 6 months?: Yes  Feet checked by healthcare provider in the last year?: Yes    Healthy Coping:  Healthy Coping Assessed Today: Yes  Emotional response to diabetes: Ready to learn  Informal Support system:: Family  Stage of change: CONTEMPLATION (Considering change and yet undecided)  Patient Activation Measure Survey Score:  MARKY Score (Last Two) 11/4/2010   MARKY Raw Score 40   Activation Score 60   MARKY Level 3       Diabetes knowledge and skills assessment:   Patient is knowledgeable in diabetes management concepts related to: Taking Medication    Patient needs further education on the following diabetes management concepts: Healthy Eating, Being Active, Monitoring and Healthy Coping    Based on learning  assessment above, most appropriate setting for further diabetes education would be: Individual setting.      INTERVENTIONS:    Education provided today on:  AADE Self-Care Behaviors:  Diabetes Pathophysiology  Healthy Eating: consistency in amount, composition, and timing of food intake, portion control and plate planning method  Being Active: relationship to blood glucose  Monitoring: purpose  Healthy Coping: benefits of making appropriate lifestyle changes    Opportunities for ongoing education and support in diabetes-self management were discussed.    Pt verbalized understanding of concepts discussed and recommendations provided today.       Education Materials Provided:  My Plate Planner and meal plans and snack ideas      ASSESSMENT:  Patient currently not testing BG's, he is not sure at this time he wants to test BG's, he also is not interested in Librepro or Tre personal sensor. He reports a head injury and shoulder injury that has complicated his life and would be difficult to manage devices.    He states he's had diabetes since 1990 and understands diet but is willing to review some information. Review of basics of healthy eating and portion control. Review of the plate planner method and provided meal plans and snack ideas.           Patient's most recent   Lab Results   Component Value Date    A1C 7.4 10/26/2021    A1C 8.6 06/10/2021    is not meeting goal of <7.0    PLAN  See Patient Instructions for co-developed, patient-stated behavior change goals.  AVS printed and provided to patient today. See Follow-Up section for recommended follow-up.    Balbina Michael RN, [facility]    Time Spent: 45 minutes  Encounter Type: Individual    Any diabetes medication dose changes were made via the CDE Protocol and Collaborative Practice Agreement with the patient's referring provider. A copy of this encounter was shared with the provider.

## 2021-11-24 NOTE — PATIENT INSTRUCTIONS
Care Plan:  Meal Plan Recommendation: eat 3 meals a day, have small snacks between meals, if needed, use portion control and use plate planning method  Refer to the meal plans and snack ideas  Exercise / activity plan: add activity as able  Check blood sugars consider testing blood sugar 1-2 times a day, before breakfast or before dinner    Follow up:  Follow-up for annual diabetes education review in 1 year or sooner, if needed.     Bring blood glucose meter and logbook with you to all doctor and follow-up appointments.     [facility] for the [facility]:  For Your Diabetes or Nutrition Education Appointments Call:  [phone]   For Diabetes or Nutrition Related Questions:   [phone]  Send Sincuru Message   If you need a medication refill please contact your pharmacy. Please allow 3 business days for your refills to be completed.

## 2021-11-24 NOTE — LETTER
11/24/2021         RE: Francois Fontana  [id number]  [facility] [address]        Dear Colleague,    Thank you for referring your patient, Francois Fontana, to the [facility]. Please see a copy of my visit note below.    Diabetes Self-Management Education & Support    Presents for: Individual review    SUBJECTIVE/OBJECTIVE:  Presents for: Individual review  Accompanied by: Self  Diabetes education in the past 24mo: No  Focus of Visit: Patient Unsure  Diabetes type: Type 2  Disease course: Improving  Transportation concerns: No  Difficulty affording diabetes medication?: No  Difficulty affording diabetes testing supplies?: No  Other concerns:: None  Cultural Influences/Ethnic Background:  American    Diabetes Symptoms & Complications:  Fatigue: No  Neuropathy: No  Polydipsia: No  Polyphagia: No  Polyuria: No  Visual change: No  Slow healing wounds: No  Complications assessed today?: Yes  Autonomic neuropathy: No  CVA: No  Heart disease: No  Nephropathy: No  Peripheral neuropathy: No  Peripheral Vascular Disease: No  Retinopathy: No    Patient Problem List and Family Medical History reviewed for relevant medical history, current medical status, and diabetes risk factors.    Vitals:  There were no vitals taken for this visit.  Estimated body mass index is 37.72 kg/m  as calculated from the following:    Height as of 11/3/21: 1.829 m (6').    Weight as of 11/3/21: 126.1 kg (278 lb 1.6 oz).   Last 3 BP:   BP Readings from Last 3 Encounters:   11/03/21 133/67   10/27/21 132/74   06/28/21 (!) 144/73       History   Smoking Status     Former Smoker     Quit date: 7/22/1985   Smokeless Tobacco     Never Used     Comment: quit 1985       Labs:  Lab Results   Component Value Date    A1C 7.4 10/26/2021    A1C 8.6 06/10/2021     Lab Results   Component Value Date    GLC 95 10/26/2021    GLC 82 06/10/2021     Lab Results   Component Value Date    LDL 32 10/26/2021    LDL 32 07/22/2020      HDL Cholesterol   Date Value Ref Range Status   07/22/2020 41 >39 mg/dL Final     Direct Measure HDL   Date Value Ref Range Status   10/26/2021 43 >=40 mg/dL Final   ]  GFR Estimate   Date Value Ref Range Status   10/26/2021 62 >60 mL/min/1.73m2 Final     Comment:     As of July 11, 2021, eGFR is calculated by the CKD-EPI creatinine equation, without race adjustment. eGFR can be influenced by muscle mass, exercise, and diet. The reported eGFR is an estimation only and is only applicable if the renal function is stable.   06/10/2021 55 (L) >60 mL/min/[1.73_m2] Final     Comment:     Non  GFR Calc  Starting 12/18/2018, serum creatinine based estimated GFR (eGFR) will be   calculated using the Chronic Kidney Disease Epidemiology Collaboration   (CKD-EPI) equation.       GFR Estimate If Black   Date Value Ref Range Status   06/10/2021 64 >60 mL/min/[1.73_m2] Final     Comment:      GFR Calc  Starting 12/18/2018, serum creatinine based estimated GFR (eGFR) will be   calculated using the Chronic Kidney Disease Epidemiology Collaboration   (CKD-EPI) equation.       Lab Results   Component Value Date    CR 1.22 10/26/2021    CR 1.34 06/10/2021     No results found for: MICROALBUMIN    Healthy Eating:  Healthy Eating Assessed Today: Yes  Breakfast: 2 cups oatmeal, butter, milk, apple sauce OR 6 scrambled eggs, sausage, green, pepper, onion  Lunch: whatever is around  Dinner: salad OR broccoli  Snacks: ham and cheese  Other: adds hot sauce to everything  Beverages: Water,Coffee,Milk,Diet soda  Has patient met with a dietitian in the past?: No    Being Active:  Being Active Assessed Today: Yes  Exercise:: Yes (physical therapy)  Days per week of moderate to strenuous exercise (like a brisk walk): 2  How intense was your typical exercise? : Light (like stretching or slow walking)  Barrier to exercise: Physical limitation    Monitoring:  Monitoring Assessed Today: Yes  Did patient bring  glucose meter to appointment? : No  Times checking blood sugar at home (number): Other (rarely)    Taking Medications:  Diabetes Medication(s)     Biguanides       metFORMIN (GLUCOPHAGE) 1000 MG tablet    Take 1 tablet (1,000 mg) by mouth 2 times daily (with meals)    Sodium-Glucose Co-Transporter 2 (SGLT2) Inhibitors       empagliflozin (JARDIANCE) 25 MG TABS tablet    Take 1 tablet (25 mg) by mouth daily    Sulfonylureas       glipiZIDE (GLUCOTROL XL) 10 MG 24 hr tablet    TAKE TWO TABLETS BY MOUTH ONCE DAILY    Insulin Sensitizing Agents       pioglitazone (ACTOS) 45 MG tablet    TAKE ONE TABLET BY MOUTH ONCE DAILY    Incretin Mimetic Agents (GLP-1 Receptor Agonists)       VICTOZA PEN 18 MG/3ML soln    INJECT 1.8MG SUBCUTANOUSLY ONCE DAILY          Taking Medication Assessed Today: Yes  Current Treatments: Non-insulin Injectables,Oral Medication (taken by mouth)  Dose schedule: Pre-dinner,Pre-breakfast  Given by: Patient  Injection/Infusion sites: Abdomen  Problems taking diabetes medications regularly?: No  Diabetes medication side effects?: No    Problem Solving:  Problem Solving Assessed Today: Yes  Is the patient at risk for hypoglycemia?: Yes  Hypoglycemia Frequency: Never              Reducing Risks:  Reducing Risks Assessed Today: Yes  Diabetes Risks: Sedentary Lifestyle,Family History,Age over 45 years  CAD Risks: Diabetes Mellitus,Sedentary lifestyle,Male sex,Family history,Obesity,Hypertension  Has dilated eye exam at least once a year?: Yes  Sees dentist every 6 months?: Yes  Feet checked by healthcare provider in the last year?: Yes    Healthy Coping:  Healthy Coping Assessed Today: Yes  Emotional response to diabetes: Ready to learn  Informal Support system:: Family  Stage of change: CONTEMPLATION (Considering change and yet undecided)  Patient Activation Measure Survey Score:  MARKY Score (Last Two) 11/4/2010   MARKY Raw Score 40   Activation Score 60   MARKY Level 3       Diabetes knowledge and skills  assessment:   Patient is knowledgeable in diabetes management concepts related to: Taking Medication    Patient needs further education on the following diabetes management concepts: Healthy Eating, Being Active, Monitoring and Healthy Coping    Based on learning assessment above, most appropriate setting for further diabetes education would be: Individual setting.      INTERVENTIONS:    Education provided today on:  AADE Self-Care Behaviors:  Diabetes Pathophysiology  Healthy Eating: consistency in amount, composition, and timing of food intake, portion control and plate planning method  Being Active: relationship to blood glucose  Monitoring: purpose  Healthy Coping: benefits of making appropriate lifestyle changes    Opportunities for ongoing education and support in diabetes-self management were discussed.    Pt verbalized understanding of concepts discussed and recommendations provided today.       Education Materials Provided:  My Plate Planner and meal plans and snack ideas      ASSESSMENT:  Patient currently not testing BG's, he is not sure at this time he wants to test BG's, he also is not interested in Librepro or Tre personal sensor. He reports a head injury and shoulder injury that has complicated his life and would be difficult to manage devices.    He states he's had diabetes since 1990 and understands diet but is willing to review some information. Review of basics of healthy eating and portion control. Review of the plate planner method and provided meal plans and snack ideas.           Patient's most recent   Lab Results   Component Value Date    A1C 7.4 10/26/2021    A1C 8.6 06/10/2021    is not meeting goal of <7.0    PLAN  See Patient Instructions for co-developed, patient-stated behavior change goals.  AVS printed and provided to patient today. See Follow-Up section for recommended follow-up.    Balbina Michael RN, [facility]    Time Spent: 45 minutes  Encounter Type: Individual    Any diabetes  medication dose changes were made via the CDE Protocol and Collaborative Practice Agreement with the patient's referring provider. A copy of this encounter was shared with the provider.

## 2021-12-17 DIAGNOSIS — E78.5 HYPERLIPIDEMIA LDL GOAL <70: ICD-10-CM

## 2021-12-17 DIAGNOSIS — I10 ESSENTIAL HYPERTENSION WITH GOAL BLOOD PRESSURE LESS THAN 140/90: ICD-10-CM

## 2021-12-20 RX ORDER — FLURBIPROFEN SODIUM 0.3 MG/ML
SOLUTION/ DROPS OPHTHALMIC
Qty: 100 EACH | Refills: 1 | Status: SHIPPED | OUTPATIENT
Start: 2021-12-20 | End: 2022-09-07

## 2021-12-20 RX ORDER — HYDROCHLOROTHIAZIDE 25 MG/1
TABLET ORAL
Qty: 90 TABLET | Refills: 2 | Status: SHIPPED | OUTPATIENT
Start: 2021-12-20 | End: 2022-09-27

## 2021-12-20 NOTE — TELEPHONE ENCOUNTER
Prescription approved per [facility] Refill Protocol.    Renee Davis RN on 12/20/2021 at 11:20 AM

## 2022-01-07 DIAGNOSIS — E11.65 TYPE 2 DIABETES MELLITUS WITH HYPERGLYCEMIA, WITHOUT LONG-TERM CURRENT USE OF INSULIN (H): ICD-10-CM

## 2022-01-10 DIAGNOSIS — E11.65 TYPE 2 DIABETES MELLITUS WITH HYPERGLYCEMIA, WITHOUT LONG-TERM CURRENT USE OF INSULIN (H): ICD-10-CM

## 2022-01-10 RX ORDER — PIOGLITAZONEHYDROCHLORIDE 45 MG/1
TABLET ORAL
Qty: 90 TABLET | Refills: 0 | Status: SHIPPED | OUTPATIENT
Start: 2022-01-10 | End: 2022-03-08

## 2022-01-10 NOTE — TELEPHONE ENCOUNTER
Prescription approved per [facility] Refill Protocol.    Renee Davis RN on 1/10/2022 at 2:27 PM

## 2022-01-11 RX ORDER — LIRAGLUTIDE 6 MG/ML
INJECTION SUBCUTANEOUS
Qty: 9 ML | Refills: 1 | Status: SHIPPED | OUTPATIENT
Start: 2022-01-11 | End: 2022-01-12

## 2022-01-12 RX ORDER — LIRAGLUTIDE 6 MG/ML
INJECTION SUBCUTANEOUS
Qty: 27 ML | Refills: 0 | Status: SHIPPED | OUTPATIENT
Start: 2022-01-12 | End: 2022-03-08

## 2022-01-12 NOTE — TELEPHONE ENCOUNTER
Prescription approved per [facility] Refill Protocol.  Landen KAPLAN RN, BSN

## 2022-01-12 NOTE — TELEPHONE ENCOUNTER
Pharmacy calling on patient behalf, patient requesting 90 day supply. Prescription approved per [facility] Refill Protocol.  Pete PINEDA RN

## 2022-01-17 ENCOUNTER — OFFICE VISIT (OUTPATIENT)
Dept: OPTOMETRY | Facility: CLINIC | Age: 67
End: 2022-01-17
Payer: COMMERCIAL

## 2022-01-17 DIAGNOSIS — E11.9 TYPE 2 DIABETES MELLITUS WITHOUT RETINOPATHY (H): ICD-10-CM

## 2022-01-17 DIAGNOSIS — H52.4 PRESBYOPIA: ICD-10-CM

## 2022-01-17 DIAGNOSIS — H52.203 ASTIGMATISM OF BOTH EYES, UNSPECIFIED TYPE: Primary | ICD-10-CM

## 2022-01-17 DIAGNOSIS — H26.9 BILATERAL INCIPIENT CATARACTS: ICD-10-CM

## 2022-01-17 DIAGNOSIS — E11.65 TYPE 2 DIABETES MELLITUS WITH HYPERGLYCEMIA, WITHOUT LONG-TERM CURRENT USE OF INSULIN (H): ICD-10-CM

## 2022-01-17 PROCEDURE — 92015 DETERMINE REFRACTIVE STATE: CPT | Performed by: OPTOMETRIST

## 2022-01-17 PROCEDURE — 92014 COMPRE OPH EXAM EST PT 1/>: CPT | Performed by: OPTOMETRIST

## 2022-01-17 ASSESSMENT — REFRACTION_MANIFEST
OD_ADD: +2.50
OS_SPHERE: -0.75
OD_SPHERE: -1.50
OD_AXIS: 112
OS_AXIS: 095
OS_CYLINDER: +0.50
OD_CYLINDER: +2.00
OS_ADD: +2.50

## 2022-01-17 ASSESSMENT — CONF VISUAL FIELD
OD_NORMAL: 1
METHOD: COUNTING FINGERS
OS_NORMAL: 1

## 2022-01-17 ASSESSMENT — CUP TO DISC RATIO
OD_RATIO: 0.2
OS_RATIO: 0.2

## 2022-01-17 ASSESSMENT — VISUAL ACUITY
OD_SC: 20/20
OD_SC: 20/80-1
OD_SC+: -2
OS_SC: 20/40
OS_SC: 20/30
METHOD: SNELLEN - LINEAR

## 2022-01-17 ASSESSMENT — TONOMETRY
IOP_METHOD: APPLANATION
OD_IOP_MMHG: 18
OS_IOP_MMHG: 18

## 2022-01-17 ASSESSMENT — EXTERNAL EXAM - RIGHT EYE: OD_EXAM: NORMAL

## 2022-01-17 ASSESSMENT — EXTERNAL EXAM - LEFT EYE: OS_EXAM: NORMAL

## 2022-01-17 NOTE — PROGRESS NOTES
Chief Complaint   Patient presents with     Diabetic Eye Exam     Accompanied by wife  Chief Complaint(s) and History of Present Illness(es)     Diabetic Eye Exam     Vision: is stable    Diabetes Type: Type 2 and taking oral medications    Duration: 40 years    Blood Sugars: is controlled               Hemoglobin A1C POCT   Date Value Ref Range Status   06/10/2021 8.6 (H) 0 - 5.6 % Final     Comment:     Reviewed: OK with previous   05/19/2021 10.4 (H) 0 - 5.6 % Final     Comment:     Normal <5.7% Prediabetes 5.7-6.4%  Diabetes 6.5% or higher - adopted from ADA   consensus guidelines.  Reviewed: OK with previous     04/29/2021 12.0 (H) 0 - 5.6 % Final     Comment:     Normal <5.7% Prediabetes 5.7-6.4%  Diabetes 6.5% or higher - adopted from ADA   consensus guidelines.  Results confirmed by repeat test       Hemoglobin A1C   Date Value Ref Range Status   10/26/2021 7.4 (H) 0.0 - 5.6 % Final     Comment:     Normal <5.7%   Prediabetes 5.7-6.4%    Diabetes 6.5% or higher     Note: Adopted from ADA consensus guidelines.            Last Eye Exam: 08/14/2020  Dilated Previously: Yes, side effects of dilation explained today    What are you currently using to see?  does not use glasses or contacts    Distance Vision Acuity: Satisfied with vision    Near Vision Acuity: Satisfied with vision while reading and using computer unaided    Eye Comfort: good  Do you use eye drops? : No  Occupation or Hobbies: Retired    Nasima Cox [place]         Medical, surgical and family histories reviewed and updated 1/17/2022.       OBJECTIVE: See Ophthalmology exam    ASSESSMENT:    ICD-10-CM    1. Type 2 diabetes mellitus with hyperglycemia, without long-term current use of insulin (H)  E11.65 Adult Eye Referral      PLAN:    Francois Fontana aware  eye exam results will be sent to Jagruti Figueredo.    Mica Askew OD

## 2022-01-17 NOTE — LETTER
1/17/2022         RE: Francois Fontana  [address] [facility] [address]        Dear Colleague,    Thank you for referring your patient, Francois Fontana, to the [facility]. Please see a copy of my visit note below.    Chief Complaint   Patient presents with     Diabetic Eye Exam     Accompanied by wife  Chief Complaint(s) and History of Present Illness(es)     Diabetic Eye Exam     Vision: is stable    Diabetes Type: Type 2 and taking oral medications    Duration: 40 years    Blood Sugars: is controlled               Hemoglobin A1C POCT   Date Value Ref Range Status   06/10/2021 8.6 (H) 0 - 5.6 % Final     Comment:     Reviewed: OK with previous   05/19/2021 10.4 (H) 0 - 5.6 % Final     Comment:     Normal <5.7% Prediabetes 5.7-6.4%  Diabetes 6.5% or higher - adopted from ADA   consensus guidelines.  Reviewed: OK with previous     04/29/2021 12.0 (H) 0 - 5.6 % Final     Comment:     Normal <5.7% Prediabetes 5.7-6.4%  Diabetes 6.5% or higher - adopted from ADA   consensus guidelines.  Results confirmed by repeat test       Hemoglobin A1C   Date Value Ref Range Status   10/26/2021 7.4 (H) 0.0 - 5.6 % Final     Comment:     Normal <5.7%   Prediabetes 5.7-6.4%    Diabetes 6.5% or higher     Note: Adopted from ADA consensus guidelines.            Last Eye Exam: 08/14/2020  Dilated Previously: Yes, side effects of dilation explained today    What are you currently using to see?  does not use glasses or contacts    Distance Vision Acuity: Satisfied with vision    Near Vision Acuity: Satisfied with vision while reading and using computer unaided    Eye Comfort: good  Do you use eye drops? : No  Occupation or Hobbies: Retired    Nasima Ell[place]or         Medical, surgical and family histories reviewed and updated 1/17/2022.       OBJECTIVE: See Ophthalmology exam    ASSESSMENT:    ICD-10-CM    1. Type 2 diabetes mellitus with hyperglycemia, without long-term current use of insulin (H)   E11.65 Adult Eye Referral      PLAN:    Francois Fontana aware  eye exam results will be sent to Jagruti Figueredo.    Mica Askew OD           Again, thank you for allowing me to participate in the care of your patient.        Sincerely,        Mica Askew, OD

## 2022-01-17 NOTE — PATIENT INSTRUCTIONS
Patient Education   Diabetes weakens the blood vessels all over the body, including the eyes. Damage to the blood vessels in the eyes can cause swelling or bleeding into part of the eye (called the retina). This is called diabetic retinopathy (LENO-tin-AH-pu-thee). If not treated, this disease can cause vision loss or blindness.   Symptoms may include blurred or distorted vision, but many people have no symptoms. It's important to see your eye doctor regularly to check for problems.   Early treatment and good control can help protect your vision. Here are the things you can do to help prevent vision loss:      1. Keep your blood sugar levels under tight control.      2. Bring high blood pressure under control.      3. No smoking.      4. Have yearly dilated eye exams.    vision is excellent, uncorrected with both eyes 20/20 ( 20/30 R, 20/20 L) without any retinopathy   And mild cataracts

## 2022-01-18 ENCOUNTER — TELEPHONE (OUTPATIENT)
Dept: ENDOCRINOLOGY | Facility: CLINIC | Age: 67
End: 2022-01-18
Payer: COMMERCIAL

## 2022-01-18 NOTE — LETTER
To whom it may concern;    Francois is a patient that suffers from type two diabetes, which he has under fair control. Francois has recently made diet and exercise changes that have played a role in improved diabetes management. Please feel free to contact us with any questions or concerns.    Sincerely,    Maryam Doshi M.D/ MARTINA. GURINDER Ledbetter, BSN, PHN

## 2022-01-18 NOTE — TELEPHONE ENCOUNTER
Pt calling looking for a letter saying diabetes was in good condition at the time of visit in NOV w/ Dr. Montano for his DOT physical coming up (appt not made yet).     Pt would like to  letter in [place] with list of medications as well. Pt does not have internet/printer at home.     Please call pt when ready to  letter and list of meds at   #[phone]    Jenny BATISTA RN

## 2022-01-20 ENCOUNTER — TELEPHONE (OUTPATIENT)
Dept: FAMILY MEDICINE | Facility: CLINIC | Age: 67
End: 2022-01-20
Payer: COMMERCIAL

## 2022-01-20 NOTE — TELEPHONE ENCOUNTER
Pt calls, wants CPAP readings, see October note, declines to call  [phone] [facility] for more current readings, only needs to show DOT that pt uses CPAP, pt wants copy of scanned 11/22/21 result, all 12 pages, printed and placed up front  Louisa Barahona RN, BSN  [facility]

## 2022-01-24 NOTE — TELEPHONE ENCOUNTER
Letter and med list mailed to patient.    Kimberlee Poole [facility] [place]  [phone]

## 2022-03-05 ENCOUNTER — HEALTH MAINTENANCE LETTER (OUTPATIENT)
Age: 67
End: 2022-03-05

## 2022-03-07 DIAGNOSIS — E11.65 TYPE 2 DIABETES MELLITUS WITH HYPERGLYCEMIA, WITHOUT LONG-TERM CURRENT USE OF INSULIN (H): ICD-10-CM

## 2022-03-07 DIAGNOSIS — E11.21 TYPE 2 DIABETES MELLITUS WITH DIABETIC NEPHROPATHY, WITHOUT LONG-TERM CURRENT USE OF INSULIN (H): ICD-10-CM

## 2022-03-07 DIAGNOSIS — I10 ESSENTIAL HYPERTENSION WITH GOAL BLOOD PRESSURE LESS THAN 140/90: ICD-10-CM

## 2022-03-07 RX ORDER — EMPAGLIFLOZIN 25 MG/1
TABLET, FILM COATED ORAL
Qty: 30 TABLET | Refills: 5 | Status: SHIPPED | OUTPATIENT
Start: 2022-03-07 | End: 2022-10-06

## 2022-03-07 NOTE — CONFIDENTIAL NOTE
Pending Prescriptions:                       Disp   Refills    JARDIANCE 25 MG TABS tablet [Pharmacy Med*30 tab*5            Sig: TAKE 1 TABLET (25 MG) BY MOUTH DAILY    RN refilled medication per [facility] Refill Protocol.       Louann HOLLEY RN, [facility] 03/07/22 3:14 PM

## 2022-03-08 RX ORDER — PIOGLITAZONEHYDROCHLORIDE 45 MG/1
TABLET ORAL
Qty: 90 TABLET | Refills: 0 | Status: SHIPPED | OUTPATIENT
Start: 2022-03-08 | End: 2022-07-07

## 2022-03-08 RX ORDER — AMLODIPINE BESYLATE 10 MG/1
TABLET ORAL
Qty: 90 TABLET | Refills: 0 | Status: SHIPPED | OUTPATIENT
Start: 2022-03-08 | End: 2022-06-24

## 2022-03-08 RX ORDER — GLIPIZIDE 10 MG/1
TABLET, FILM COATED, EXTENDED RELEASE ORAL
Qty: 180 TABLET | Refills: 0 | Status: SHIPPED | OUTPATIENT
Start: 2022-03-08 | End: 2022-07-28

## 2022-03-08 RX ORDER — LIRAGLUTIDE 6 MG/ML
INJECTION SUBCUTANEOUS
Qty: 27 ML | Refills: 0 | Status: SHIPPED | OUTPATIENT
Start: 2022-03-08 | End: 2022-08-13

## 2022-03-08 NOTE — TELEPHONE ENCOUNTER
Prescription approved per [facility] Refill Protocol.  Ana Maria Boyle, RN, BSN, PHN  [facility]

## 2022-03-10 DIAGNOSIS — E11.21 TYPE 2 DIABETES MELLITUS WITH DIABETIC NEPHROPATHY, WITHOUT LONG-TERM CURRENT USE OF INSULIN (H): Primary | ICD-10-CM

## 2022-03-28 DIAGNOSIS — E11.65 TYPE 2 DIABETES MELLITUS WITH HYPERGLYCEMIA, WITHOUT LONG-TERM CURRENT USE OF INSULIN (H): ICD-10-CM

## 2022-03-30 RX ORDER — GLIPIZIDE 10 MG/1
TABLET, FILM COATED, EXTENDED RELEASE ORAL
Qty: 180 TABLET | Refills: 0 | OUTPATIENT
Start: 2022-03-30

## 2022-03-31 ENCOUNTER — PATIENT OUTREACH (OUTPATIENT)
Dept: FAMILY MEDICINE | Facility: CLINIC | Age: 67
End: 2022-03-31
Payer: COMMERCIAL

## 2022-03-31 NOTE — LETTER
Francois Fontana  [address] [facility] [address]    Alberto Reyes,     Thank you for choosing [facility] today for your health care needs.     [facility] is transforming primary care  At [facility], we re dedicated to constantly improve how we serve the health care needs of our patients and communities. We re currently making changes to the way we deliver care.     Changes you ll notice include:    An emphasis on building a relationship with a primary care provider    Access to a PAL (patient advocate and liaison) to help guide you with your care needs    Appointment lengths tailored to your specific needs and greater access to a care team to help you and your provider improve and maintain your health and well-being    Improved online access to your care team    Benefits of a primary care provider  If you don t have a designated primary care provider, we encourage you to get to know our care team online and find a provider you d like to see. Most of our providers have a short video on their online provider page. Visit [email or web address] to explore our providers and locations.    Benefits of having a primary care provider include:      They get to know you - your health history, family history and goals, making it easier to make a health plan together.     You get to know them - making health-related conversations and decisions easier      Primary care doctors help you when you re sick or hurt - but also focus on keeping you healthy with preventive care and screenings.      A doctor who sees you regularly is more likely to notice changes in your health.     You ll be connected to a broad care team who partners with your provider to support you.    Patient Advocate Liaison (PAL)   To help make sure you get the right care, at the right time, we include PALs, or Patient Advocate Liaisons, as part of your care team. Your PAL will be your first line of contact. They ll advocate for your needs and  help you navigate our services, connecting you with care team members and community resources to ensure your care is well coordinated. You ll be introduced to a PAL in an upcoming visit.     Expanded care team access with tailored appointment lengths  Depending on your health care needs, you may have longer or shorter appointments and see additional care team providers - including Medication Therapy Management (MTM) pharmacists, diabetes educators, behavioral health clinicians, or social workers. At times, they may be included in your visit with your provider, or you may see them individually.     Online access to your health care records and care team  [employer] is our online tool that makes it easy to see your health care information and communicate with your care team.     [employer] allows you to:     View your health maintenance plan so you know when you re due for a preventive screening    Send secure messages to your care team    View your health history and visit summaries     Schedule appointments     Complete questionnaires and eCheck-in before appointments      Get care from your provider with an e-visit      View and pay your bill     Sign up at [email or web address]. Once you have an account, you also can download the mobile ann.     Connecting to fast and convenient care  When you need fast, convenient care - consider one of the following options:       Video Visit: A convenient care option for visiting with your provider out of the comfort of your own home. Most of the things you come to the clinic to address with your provider can now be done virtually through a video. This includes your chronic medication follow up, questions or concerns you may have, and even your annual Medicare Wellness Visit.       Phone Visit: Another convenient option for follow up of common problems that may require a more in-depth discussion with your provider.       E-visit: When you need acute care quickly, or have a quick  question about your medication, an E-visit is completed through [employer] and your provider will respond within one business day.      Ana Maria MOTA, RN, BSN, PHN - Patient Advocate Liaison - PAL RN  [facility]  [phone]

## 2022-03-31 NOTE — TELEPHONE ENCOUNTER
SB 3 PAL Welcome Letter Sent  Ana Maria MOTA RN, BSN, PHN - Patient Advocate Liaison - PAL RN  [facility]  [phone]

## 2022-04-06 ENCOUNTER — OFFICE VISIT (OUTPATIENT)
Dept: ENDOCRINOLOGY | Facility: CLINIC | Age: 67
End: 2022-04-06
Payer: COMMERCIAL

## 2022-04-06 VITALS
BODY MASS INDEX: 40.23 KG/M2 | SYSTOLIC BLOOD PRESSURE: 144 MMHG | TEMPERATURE: 97.8 F | HEIGHT: 72 IN | WEIGHT: 297 LBS | DIASTOLIC BLOOD PRESSURE: 71 MMHG | HEART RATE: 100 BPM | RESPIRATION RATE: 16 BRPM | OXYGEN SATURATION: 100 %

## 2022-04-06 DIAGNOSIS — I10 ESSENTIAL HYPERTENSION WITH GOAL BLOOD PRESSURE LESS THAN 140/90: ICD-10-CM

## 2022-04-06 DIAGNOSIS — E11.65 TYPE 2 DIABETES MELLITUS WITH HYPERGLYCEMIA, WITHOUT LONG-TERM CURRENT USE OF INSULIN (H): ICD-10-CM

## 2022-04-06 DIAGNOSIS — E11.21 TYPE 2 DIABETES MELLITUS WITH DIABETIC NEPHROPATHY, WITHOUT LONG-TERM CURRENT USE OF INSULIN (H): Primary | ICD-10-CM

## 2022-04-06 PROCEDURE — 99213 OFFICE O/P EST LOW 20 MIN: CPT | Performed by: INTERNAL MEDICINE

## 2022-04-06 RX ORDER — BLOOD SUGAR DIAGNOSTIC
STRIP MISCELLANEOUS
Qty: 300 STRIP | Refills: 3 | Status: SHIPPED | OUTPATIENT
Start: 2022-04-06 | End: 2023-09-19

## 2022-04-06 NOTE — Clinical Note
4/6/2022         RE: Francois Fontana  [address] [facility] [id number]        Dear Colleague,    Thank you for referring your patient, Francois Fontana, to the [facility]. Please see a copy of my visit note below.    ENDOCRINOLOGY CLINIC NOTE:  Name: Francois Fontana  Seen for f/u of  Diabetes.  HPI:  Francois Fontana is a 66 year old male who presents for the evaluation/management of Diabetes.   has a past medical history of Diabetes mellitus, type 2 (H), Hyperlipidemia, Rosacea, Stenosis of left subclavian artery (H), Type II or unspecified type diabetes mellitus without mention of complication, not stated as uncontrolled, and Unspecified essential hypertension.   Was seen by  and Sherri Mayer before.    He is not checking BG.  He is now going to gym.  He had shoulder surgery for rotator cuff injury. It is now better.  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  He is resistance to take insulin.  No recent A1c and not checking BG.    Wt Readings from Last 2 Encounters:   04/06/22 134.7 kg (297 lb)   11/03/21 126.1 kg (278 lb 1.6 oz)       1. Type 2 DM:  Orginally diagnosed at the age of: Original diagnosis in 1990.   Current Regimen:   yes:     Diabetes Medication(s)     Biguanides       metFORMIN (GLUCOPHAGE) 1000 MG tablet    Take 1 tablet (1,000 mg) by mouth 2 times daily (with meals)    Sodium-Glucose Co-Transporter 2 (SGLT2) Inhibitors       JARDIANCE 25 MG TABS tablet    TAKE 1 TABLET (25 MG) BY MOUTH DAILY    Sulfonylureas       glipiZIDE (GLUCOTROL XL) 10 MG 24 hr tablet    TAKE TWO TABLETS BY MOUTH ONCE DAILY    Insulin Sensitizing Agents       pioglitazone (ACTOS) 45 MG tablet    TAKE ONE TABLET BY MOUTH ONCE DAILY    Incretin Mimetic Agents (GLP-1 Receptor Agonists)       VICTOZA PEN 18 MG/3ML soln    INJECT 1.8MG SUBCUTANOUSLY ONCE DAILY          BS checks: not checking BG  Average Meter Download: Patient is not  checking BG. Without Blood sugar data it is difficult to make adjustment to medical regimen.     Exercise: goes to gym  Last A1c:  Symptoms of hypoglycemia (low blood sugar):  Gets symptoms of hypoglycemia.  Episodes of hypoglycemia:    DM Complications:   Complications:   Diabetes Complications  Description / Detail    Diabetic Retinopathy  No   CAD / PAD  No   Neuropathy  No   Nephropathy / Microalbuminuria     + microalbuminuria  Lab Results   Component Value Date    UMALCR 68.50 2021         Gastroparesis  No   Hypoglycemia [place]  No       2. Hypertension:    on medications  3. Hyperlipidemia: on medications    PMH/PSH:  Past Medical History:   Diagnosis Date     Diabetes mellitus, type 2 (H)      Hyperlipidemia      Rosacea      Stenosis of left subclavian artery (H)      Type II or unspecified type diabetes mellitus without mention of complication, not stated as uncontrolled     Abstracted 02     Unspecified essential hypertension      Past Surgical History:   Procedure Laterality Date     HERNIA REPAIR, INGUINAL RT/LT      right     Family Hx:  Family History   Problem Relation Age of Onset     Cancer Father          of lung cancer     C.A.D. Father         Mult bypass operations     Diabetes Type 2  Father      Prostate Cancer Father      Respiratory Mother          of COPD     Gastrointestinal Disease Brother         Hemochromatosis     Gastrointestinal Disease Brother         Hemochromatosis     Gastrointestinal Disease Brother         Hemochromatosis ( in 3 bros)       Diabetes: Father    Social Hx:  Social History     Socioeconomic History     Marital status:      Spouse name: Not on file     Number of children: Not on file     Years of education: Not on file     Highest education level: Not on file   Occupational History     Not on file   Tobacco Use     Smoking status: Former Smoker     Quit date: 1985     Years since quittin.7     Smokeless tobacco: Never  Used     Tobacco comment: quit 1985   Vaping Use     Vaping Use: Never used   Substance and Sexual Activity     Alcohol use: No     Alcohol/week: 0.0 standard drinks     Comment: quit drinking age 30     Drug use: No     Sexual activity: Yes     Partners: Female   Other Topics Concern     Parent/sibling w/ CABG, MI or angioplasty before 65F 55M? Yes   Social History Narrative     Not on file     Social Determinants of Health     Financial Resource Strain: Not on file   Food Insecurity: Not on file   Transportation Needs: Not on file   Physical Activity: Not on file   Stress: Not on file   Social Connections: Not on file   Intimate Partner Violence: Not on file   Housing Stability: Not on file          MEDICATIONS:  has a current medication list which includes the following prescription(s): alcohol swab, amlodipine, aspirin, b-d u/f, accu-chek smartview, blood glucose calibration, blood glucose monitoring, doxycycline hyclate, glipizide, hydrochlorothiazide, jardiance, metformin, order for dme, pioglitazone, simvastatin, thin, valsartan, victoza pen, and tramadol.    ROS     ROS: 10 point ROS neg other than the symptoms noted above in the HPI.    Physical Exam   VS: BP (!) 144/71   Pulse 100   Temp 97.8  F (36.6  C) (Tympanic)   Resp 16   Ht 1.829 m (6')   Wt 134.7 kg (297 lb)   SpO2 100%   BMI 40.28 kg/m    GENERAL: healthy, alert and no distress  EYES: Eyes grossly normal to inspection, conjunctivae and sclerae normal  ENT: no nose swelling, nasal discharge.  Thyroid: no apparent thyroid nodules.  Thyroid appears normal in size and nontender.  CV: RRR, no rubs, gallops, no murmurs  RESP: CTAB, no wheezes, rales, or ronchi  ABDO: +BS  EXTREMITIES: no hand tremors.  NEURO: Cranial nerves grossly intact, mentation intact and speech normal  SKIN: No apparent skin lesions, rash or edema seen   PSYCH: mentation appears normal, affect normal/bright, judgement and insight intact, normal speech and appearance  well-groomed      LABS:  A1c:  Lab Results   Component Value Date    A1C 7.4 10/26/2021    A1C 8.6 06/10/2021    A1C 10.4 05/19/2021    A1C 12.0 04/29/2021    A1C 13.1 04/08/2021    A1C 9.4 07/01/2020     Creatinine:  Creatinine   Date Value Ref Range Status   10/26/2021 1.22 0.66 - 1.25 mg/dL Final   06/10/2021 1.34 (H) 0.66 - 1.25 mg/dL Final       Urine Micro:  Lab Results   Component Value Date    UMALCR 68.50 06/28/2021      LFTs/Lipids:  Lab Results   Component Value Date    CHOL 102 10/26/2021    CHOL 94 07/22/2020     Lab Results   Component Value Date    HDL 43 10/26/2021    HDL 41 07/22/2020     Lab Results   Component Value Date    LDL 32 10/26/2021    LDL 32 07/22/2020     Lab Results   Component Value Date    TRIG 137 10/26/2021    TRIG 107 07/22/2020     Lab Results   Component Value Date    CHOLHDLRATIO 3.7 11/17/2015       TFTs:  TSH   Date Value Ref Range Status   04/08/2021 1.51 0.40 - 4.00 mU/L Final   ]        All pertinent notes, labs, and images personally reviewed by me.     Glucometer/ insulin pump (if applicable)/ CGM data (if applicable) downloaded, Personally reviewed and interpreted.  All Blood sugar data reviewed personally and discussed with pt.  See nursing note from 11/3/2021 for details of BG/CGM log.      A/P  Mr.Raymond COLETTE Fontana is a 66 year old here for the evaluation/management of diabetes:    1. DM2 - Under Fair control.  A1c 7.4%.  A1c improved to 7.4%.  Diabetes complicated by microalbuminuria.  Plan:  Discussed diagnosis, pathophysiology, management and treatment options of condition with pt.  I also discussed importance of strict blood sugar control to prevent complications associated with uncontrolled diabetes.  In general blood sugars are improving and A1c is improved as noted above with lifestyle management-he is focusing on diet, trying to eat healthy and is also increased activity.  In the setting of no recent A1c and no blood sugar data plan to continue current  regimen.  Labs in few weeks. (Offered to get labs done today but he would like to hold off and get all labs done in the next few weeks).  Start checking Blood glucose.  Repeat labs and follow up in 3 months.  Consider diagnostic CGM in future.  Labs and follow up in 3 months.  Follow up with eye doctor.    2. Hypertension - Under Good control. On hydrochlorothiazide 25 mg, amlodipine 10 mg and valsartan 160 mg.  + microabuminuria: on Valsartan.    3. Hyperlipidemia - Under Good control. On simvastatin 20 mg. LDL 32.    4. Prevention:  Opthalmology- no  Recommend annually.  ASA- 81 mg/day  Smoking- former smoker    Most Recent Immunizations   Administered Date(s) Administered     COVID-19,PF,Pfizer (12+ Yrs) 10/30/2021     Influenza (IIV3) PF 08/23/2014     Influenza (intradermal) 10/29/2018     Influenza Quad, Recombinant, pf(RIV4) (Flublok) 10/05/2020     Influenza Vaccine IM > 6 months Valent IIV4 (Alfuria,Fluzone) 09/15/2016     Influenza, Quad, High Dose, Pf, 65yr+ (Fluzone HD) 09/11/2021     Pneumococcal 23 valent 11/02/2006     TDAP Vaccine (Adacel) 10/20/2008     Td (Adult), Adsorbed 03/13/2019     Tdap (Adacel,Boostrix) 10/20/2008         Recommend checking blood sugars before meals and at bedtime.    If Blood glucose are low more often-> 2-3 times/week- give us a call.  The patient is advised to Make better food choices: reduce carbs, Reduce portion size, weight loss and exercise 3-4 times a week.  Discussed hypoglycemia signs and symptoms as well as management in detail.    There is some variability among people, most will usually develop symptoms suggestive of hypoglycemia when blood glucose levels are lowered to the mid 60's. The first set of symptoms are called adrenergic. Patients may experience any of the following nervousness, sweating, intense hunger, trembling, weakness, palpitations, and difficulty speaking.   The acute management of hypoglycemia involves the rapid delivery of a source of  easily absorbed sugar. Regular soda, juice, lifesavers, table sugar, are good options. 15 grams of glucose is the dose that is given, followed by an assessment of symptoms and a blood glucose check if possible. If after 10 minutes there is no improvement, another 10-15 grams should be given. This can be repeated up to three times. The equivalency of 10-15 grams of glucose (approximate servings) are: Four lifesavers, 4 teaspoons of sugar, or 1/2 can of regular soda or juice.     All questions were answered.  The patient indicates understanding of the above issues and agrees with the plan set forth.     Follow-up:  3 months.    Maryam Doshi M.D  Endocrinology  [facility]  CC: Danna Montoya, Jagruti Reynoso    Disclaimer: This note consists of symbols derived from keyboarding, dictation and/or voice recognition software. As a result, there may be errors in the script that have gone undetected. Please consider this when interpreting information found in this chart.    Addendum to above note and clinic visit:    Labs reviewed.    See result note/telephone encounter.            Again, thank you for allowing me to participate in the care of your patient.        Sincerely,        Maryam Doshi MD

## 2022-04-06 NOTE — PATIENT INSTRUCTIONS
[facility]  Dr Doshi, Endocrinology Department    [facility]   [address]  [place] [address]  Appointment [phone]  Fax: [phone]  [place]: Monday - Thursday      Please check the cost coverage and copay with insurance before recommended tests, services and medications (especially if new medications are prescribed).     If ordered, please get blood work done 1 week prior to your next appointment so they will be available to Dr. Doshi at your visit.    To provide the best diabetic care, please bring your blood glucose meter to each and every visit with your  Endocrinologist. Your blood glucose CGM/meter/insulin pump will be downloaded at every appointment.  Please arrive 15 minutes before your scheduled appointment. This will allow for your blood glucose CGM/meter/insulin pump  to be downloaded.  If you are wearing DEXCOM please bring  or sharing code from the Dexcom Clarity Katie so that it can be downloaded.  If you are using Epiphany personal sensors please bring the reader.    Continue current regimen for Diabetes.  Labs in few weeks.  Need to make lab only appointment.  Start checking Blood glucose.  Repeat labs and follow up in 3 months.  Please make a lab appointment for blood work and follow up clinic appointment in 1 week after that to discuss results.    Check Blood glucose fasting every day and before lunch/dinner/bedtime on alternate days.  If Blood glucose are low more often-> 2-3 times/week- give us a call.  The patient is advised to Make better food choices: reduce carbs, Reduce portion size, weight loss and exercise 3-4 times a week.  Discussed hypoglycemia signs and symptoms as well as management in detail.      What is hypoglycemia:  Hypoglycemia is when blood sugar levels become too low - below 70 m/dl.      What causes hypoglycemia?  - using too much insulin  -taking too many diabetes pills  -not  eating enough, or skipping meals or snacks  -not eating enough carbohydrate with meals  -changing your exercise routine  -drinking alcohol in excess    It is also possible to have hypoglycemia even when you are carefully managing your blood sugar levels.    What does it feel like when blood sugars get too low?  You may feel:  - anxious  -confused  -dizzy  -hungry  -light-headed  -nervous  -shaky  -sleepy  -sweaty    You may have  -blurred or cloudy vision  -heart palpitations (heart skips a beat or races)  -tingling or numbness around the mouth and tongue  -tremors    What to do if you have symptoms of hypoglycmemia:  If you think your blood sugar is too low, check it with a glucose meter.  If its below 70 mg/dl, consume one of the following:  Fruit juice (1/2 cup)  Glucose tablets (15 grams)  Hard candy (5 to 7 pieces)  Honey or sugar (2 teaspoons)  Milk (1/2 cup)  Soft drink (non-diet, 1/2 cup)    Wait 15 minutes and check your blood glucose again.  IF it is still below 70 mg/dl, have another food item listed above. Wait another 15 minutes and repeat the blood glucose test.  Have a small meal or snack that contains some carbohydrate after your blood glucose rises above 70 mg/dl.    If you are at risk of hypoglycemia, always carry with you glucose tablets or one of the foods listed above.      To prevent Hypoglycemia:  Avoid situations that may cause hypoglycemia  Before making any change to your diet or exercise routine, discuss them with your healthcare provider  Keep a record of your blood glucose levels.  Include the time of day, diabetes medications, when you had your last meal or snack, and what you were doing at the time (e.g. Watching TV, gardening, jogging, etc).    Talk to your healthcare provider if your blood glucose levels are often low        Patient guide on hypoglycemia    http://[email or web address].org/Resources/upload/patient-guide-diagnosis-[email or web address]-management-[email or web address]

## 2022-04-06 NOTE — NURSING NOTE
ENDOCRINOLOGY INTAKE FORM    Patient Name:  Francois Fontana  :  1955    Is patient Diabetic?   Yes: type 2  Does patient have non-diabetic or other endocrine issues?  Yes:   Morbid obesity due to excess calories (H)    Hyperlipidemia LDL goal <70    Vitals: There were no vitals taken for this visit.  BMI= There is no height or weight on file to calculate BMI.    Flu vaccine:  Yes:   Pneumonia vaccine:  Yes: pneumovax    Smoking and Alcohol use:  Social History     Tobacco Use     Smoking status: Former Smoker     Quit date: 1985     Years since quittin.7     Smokeless tobacco: Never Used     Tobacco comment: quit    Vaping Use     Vaping Use: Never used   Substance Use Topics     Alcohol use: No     Alcohol/week: 0.0 standard drinks     Comment: quit drinking age 30     Drug use: No       Foot Exam: Yes: 22  Eye Exam:  Yes: 23  Dental Exam:    Aspirin Use:  Yes: 81 mg    Lab Results   Component Value Date    A1C 7.4 10/26/2021    A1C 8.6 06/10/2021    A1C 10.4 2021    A1C 12.0 2021    A1C 13.1 2021    A1C 9.4 2020       Lab Results   Component Value Date    MICROL 91 2021     No results found for: MICROALBUMIN    Kimberlee Poole [facility] Endocrin[facility] [place]  [phone]

## 2022-04-06 NOTE — PROGRESS NOTES
ENDOCRINOLOGY CLINIC NOTE:  Name: Francois Fontana  Seen for f/u of  Diabetes.  HPI:  Francois Fontana is a 66 year old male who presents for the evaluation/management of Diabetes.   has a past medical history of Diabetes mellitus, type 2 (H), Hyperlipidemia, Rosacea, Stenosis of left subclavian artery (H), Type II or unspecified type diabetes mellitus without mention of complication, not stated as uncontrolled, and Unspecified essential hypertension.   Was seen by  and Sherri Mayer before.    He is not checking BG.  He is now going to gym.  He had shoulder surgery for rotator cuff injury. It is now better.  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  He is resistance to take insulin.  No recent A1c and not checking BG.    Wt Readings from Last 2 Encounters:   04/06/22 134.7 kg (297 lb)   11/03/21 126.1 kg (278 lb 1.6 oz)       1. Type 2 DM:  Orginally diagnosed at the age of: Original diagnosis in 1990.   Current Regimen:   yes:     Diabetes Medication(s)     Biguanides       metFORMIN (GLUCOPHAGE) 1000 MG tablet    Take 1 tablet (1,000 mg) by mouth 2 times daily (with meals)    Sodium-Glucose Co-Transporter 2 (SGLT2) Inhibitors       JARDIANCE 25 MG TABS tablet    TAKE 1 TABLET (25 MG) BY MOUTH DAILY    Sulfonylureas       glipiZIDE (GLUCOTROL XL) 10 MG 24 hr tablet    TAKE TWO TABLETS BY MOUTH ONCE DAILY    Insulin Sensitizing Agents       pioglitazone (ACTOS) 45 MG tablet    TAKE ONE TABLET BY MOUTH ONCE DAILY    Incretin Mimetic Agents (GLP-1 Receptor Agonists)       VICTOZA PEN 18 MG/3ML soln    INJECT 1.8MG SUBCUTANOUSLY ONCE DAILY          BS checks: not checking BG  Average Meter Download: Patient is not checking BG. Without Blood sugar data it is difficult to make adjustment to medical regimen.     Exercise: goes to gym  Last A1c:  Symptoms of hypoglycemia (low blood sugar):  Gets symptoms of hypoglycemia.  Episodes of hypoglycemia:    DM Complications:    Complications:   Diabetes Complications  Description / Detail    Diabetic Retinopathy  No   CAD / PAD  No   Neuropathy  No   Nephropathy / Microalbuminuria     + microalbuminuria  Lab Results   Component Value Date    UMALCR 68.50 2021         Gastroparesis  No   Hypoglycemia [place]  No       2. Hypertension:    on medications  3. Hyperlipidemia: on medications    PMH/PSH:  Past Medical History:   Diagnosis Date     Diabetes mellitus, type 2 (H)      Hyperlipidemia      Rosacea      Stenosis of left subclavian artery (H)      Type II or unspecified type diabetes mellitus without mention of complication, not stated as uncontrolled     Abstracted 02     Unspecified essential hypertension      Past Surgical History:   Procedure Laterality Date     HERNIA REPAIR, INGUINAL RT/LT      right     Family Hx:  Family History   Problem Relation Age of Onset     Cancer Father          of lung cancer     C.A.D. Father         Mult bypass operations     Diabetes Type 2  Father      Prostate Cancer Father      Respiratory Mother          of COPD     Gastrointestinal Disease Brother         Hemochromatosis     Gastrointestinal Disease Brother         Hemochromatosis     Gastrointestinal Disease Brother         Hemochromatosis ( in 3 bros)       Diabetes: Father    Social Hx:  Social History     Socioeconomic History     Marital status:      Spouse name: Not on file     Number of children: Not on file     Years of education: Not on file     Highest education level: Not on file   Occupational History     Not on file   Tobacco Use     Smoking status: Former Smoker     Quit date: 1985     Years since quittin.7     Smokeless tobacco: Never Used     Tobacco comment: quit    Vaping Use     Vaping Use: Never used   Substance and Sexual Activity     Alcohol use: No     Alcohol/week: 0.0 standard drinks     Comment: quit drinking age 30     Drug use: No     Sexual activity: Yes     Partners:  Female   Other Topics Concern     Parent/sibling w/ CABG, MI or angioplasty before 65F 55M? Yes   Social History Narrative     Not on file     Social Determinants of Health     Financial Resource Strain: Not on file   Food Insecurity: Not on file   Transportation Needs: Not on file   Physical Activity: Not on file   Stress: Not on file   Social Connections: Not on file   Intimate Partner Violence: Not on file   Housing Stability: Not on file          MEDICATIONS:  has a current medication list which includes the following prescription(s): alcohol swab, amlodipine, aspirin, b-d u/f, accu-chek smartview, blood glucose calibration, blood glucose monitoring, doxycycline hyclate, glipizide, hydrochlorothiazide, jardiance, metformin, order for dme, pioglitazone, simvastatin, thin, valsartan, victoza pen, and tramadol.    ROS     ROS: 10 point ROS neg other than the symptoms noted above in the HPI.    Physical Exam   VS: BP (!) 144/71   Pulse 100   Temp 97.8  F (36.6  C) (Tympanic)   Resp 16   Ht 1.829 m (6')   Wt 134.7 kg (297 lb)   SpO2 100%   BMI 40.28 kg/m    GENERAL: healthy, alert and no distress  EYES: Eyes grossly normal to inspection, conjunctivae and sclerae normal  ENT: no nose swelling, nasal discharge.  Thyroid: no apparent thyroid nodules.  Thyroid appears normal in size and nontender.  CV: RRR, no rubs, gallops, no murmurs  RESP: CTAB, no wheezes, rales, or ronchi  ABDO: +BS  EXTREMITIES: no hand tremors.  NEURO: Cranial nerves grossly intact, mentation intact and speech normal  SKIN: No apparent skin lesions, rash or edema seen   PSYCH: mentation appears normal, affect normal/bright, judgement and insight intact, normal speech and appearance well-groomed      LABS:  A1c:  Lab Results   Component Value Date    A1C 7.4 10/26/2021    A1C 8.6 06/10/2021    A1C 10.4 05/19/2021    A1C 12.0 04/29/2021    A1C 13.1 04/08/2021    A1C 9.4 07/01/2020     Creatinine:  Creatinine   Date Value Ref Range Status    10/26/2021 1.22 0.66 - 1.25 mg/dL Final   06/10/2021 1.34 (H) 0.66 - 1.25 mg/dL Final       Urine Micro:  Lab Results   Component Value Date    UMALCR 68.50 06/28/2021      LFTs/Lipids:  Lab Results   Component Value Date    CHOL 102 10/26/2021    CHOL 94 07/22/2020     Lab Results   Component Value Date    HDL 43 10/26/2021    HDL 41 07/22/2020     Lab Results   Component Value Date    LDL 32 10/26/2021    LDL 32 07/22/2020     Lab Results   Component Value Date    TRIG 137 10/26/2021    TRIG 107 07/22/2020     Lab Results   Component Value Date    CHOLHDLRATIO 3.7 11/17/2015       TFTs:  TSH   Date Value Ref Range Status   04/08/2021 1.51 0.40 - 4.00 mU/L Final   ]        All pertinent notes, labs, and images personally reviewed by me.     Glucometer/ insulin pump (if applicable)/ CGM data (if applicable) downloaded, Personally reviewed and interpreted.  All Blood sugar data reviewed personally and discussed with pt.  See nursing note from 11/3/2021 for details of BG/CGM log.      A/P  Mr.Raymond COLETTE Fontana is a 66 year old here for the evaluation/management of diabetes:    1. DM2 - Under Fair control.  A1c 7.4%.  A1c improved to 7.4%.  Diabetes complicated by microalbuminuria.  Plan:  Discussed diagnosis, pathophysiology, management and treatment options of condition with pt.  I also discussed importance of strict blood sugar control to prevent complications associated with uncontrolled diabetes.  In general blood sugars are improving and A1c is improved as noted above with lifestyle management-he is focusing on diet, trying to eat healthy and is also increased activity.  In the setting of no recent A1c and no blood sugar data plan to continue current regimen.  Labs in few weeks. (Offered to get labs done today but he would like to hold off and get all labs done in the next few weeks).  Start checking Blood glucose.  Repeat labs and follow up in 3 months.  Consider diagnostic CGM in future.  Labs and follow up in  3 months.  Follow up with eye doctor.    2. Hypertension - Under Good control. On hydrochlorothiazide 25 mg, amlodipine 10 mg and valsartan 160 mg.  + microabuminuria: on Valsartan.    3. Hyperlipidemia - Under Good control. On simvastatin 20 mg. LDL 32.    4. Prevention:  Opthalmology- no  Recommend annually.  ASA- 81 mg/day  Smoking- former smoker    Most Recent Immunizations   Administered Date(s) Administered     COVID-19,PF,Pfizer (12+ Yrs) 10/30/2021     Influenza (IIV3) PF 08/23/2014     Influenza (intradermal) 10/29/2018     Influenza Quad, Recombinant, pf(RIV4) (Flublok) 10/05/2020     Influenza Vaccine IM > 6 months Valent IIV4 (Alfuria,Fluzone) 09/15/2016     Influenza, Quad, High Dose, Pf, 65yr+ (Fluzone HD) 09/11/2021     Pneumococcal 23 valent 11/02/2006     TDAP Vaccine (Adacel) 10/20/2008     Td (Adult), Adsorbed 03/13/2019     Tdap (Adacel,Boostrix) 10/20/2008         Recommend checking blood sugars before meals and at bedtime.    If Blood glucose are low more often-> 2-3 times/week- give us a call.  The patient is advised to Make better food choices: reduce carbs, Reduce portion size, weight loss and exercise 3-4 times a week.  Discussed hypoglycemia signs and symptoms as well as management in detail.    There is some variability among people, most will usually develop symptoms suggestive of hypoglycemia when blood glucose levels are lowered to the mid 60's. The first set of symptoms are called adrenergic. Patients may experience any of the following nervousness, sweating, intense hunger, trembling, weakness, palpitations, and difficulty speaking.   The acute management of hypoglycemia involves the rapid delivery of a source of easily absorbed sugar. Regular soda, juice, lifesavers, table sugar, are good options. 15 grams of glucose is the dose that is given, followed by an assessment of symptoms and a blood glucose check if possible. If after 10 minutes there is no improvement, another 10-15  grams should be given. This can be repeated up to three times. The equivalency of 10-15 grams of glucose (approximate servings) are: Four lifesavers, 4 teaspoons of sugar, or 1/2 can of regular soda or juice.     All questions were answered.  The patient indicates understanding of the above issues and agrees with the plan set forth.     Follow-up:  3 months.    Maryam Doshi M.D  Endocrinology  [facility]  CC: Jagruti Figueredo    Disclaimer: This note consists of symbols derived from keyboarding, dictation and/or voice recognition software. As a result, there may be errors in the script that have gone undetected. Please consider this when interpreting information found in this chart.    Addendum to above note and clinic visit:    Labs reviewed.    See result note/telephone encounter.

## 2022-06-15 NOTE — DISCHARGE INSTRUCTIONS
No signs of skull fracture or head bleed.  No signs of shoulder fracture.  You may have a rotator cuff injury. Recommend wearing a sling for comfort. Range of motion exercises 3 times daily as we discussed.   Follow-up with orthopedics early next week for recheck of your shoulder.   Return to emergency department for worsening/new symptoms or for any other concerns.     Discharge Instructions  Head Injury    You have been seen today for a head injury. Your evaluation included a history and physical examination. You may have had a CT (CAT) scan performed, though most head injuries do not require a scan. Based on this evaluation, your provider today does not feel that your head injury is serious.    Generally, every Emergency Department visit should have a follow-up clinic visit with either a primary or a specialty clinic/provider. Please follow-up as instructed by your emergency provider today.  Return to the Emergency Department if:  You are confused or you are not acting right.  Your headache gets worse or you start to have a really bad headache even with your recommended treatment plan.  You vomit (throw up) more than once.  You have a seizure.  You have trouble walking.  You have weakness or paralysis (cannot move) in an arm or a leg.  You have blood or fluid coming from your ears or nose.  You have new symptoms or anything that worries you.    Sleeping:  It is okay for you to sleep, but someone should wake you up if instructed by your provider, and someone should check on you at your usual time to wake up.     Activity:  Do not drive for at least 24 hours.  Do not drive if you have dizzy spells or trouble concentrating, or remembering things.  Do not return to any contact sports until cleared by your regular provider.     MORE INFORMATION:    Concussion:  A concussion is a minor head injury that may cause temporary problems with the way the brain works. Although concussions are important, they are generally not  an emergency or a reason that a person needs to be hospitalized. Some concussion symptoms include confusion, amnesia (forgetful), nausea (sick to your stomach) and vomiting (throwing up), dizziness, fatigue, memory or concentration problems, irritability and sleep problems. For most people, concussions are mild and temporary but some will have more severe and persistent symptoms that require on-going care and treatment.  CT Scans: Your evaluation today may have included a CT scan (CAT scan) to look for things like bleeding or a skull fracture (broken bone).  CT scans involve radiation and too many CT scans can cause serious health problems like cancer, especially in children.  Because of this, your provider may not have ordered a CT scan today if they think you are at low risk for a serious or life threatening problem.    If you were given a prescription for medicine here today, be sure to read all of the information (including the package insert) that comes with your prescription.  This will include important information about the medicine, its side effects, and any warnings that you need to know about.  The pharmacist who fills the prescription can provide more information and answer questions you may have about the medicine.  If you have questions or concerns that the pharmacist cannot address, please call or return to the Emergency Department.     Remember that you can always come back to the Emergency Department if you are not able to see your regular provider in the amount of time listed above, if you get any new symptoms, or if there is anything that worries you.     English

## 2022-06-23 DIAGNOSIS — I10 ESSENTIAL HYPERTENSION WITH GOAL BLOOD PRESSURE LESS THAN 140/90: ICD-10-CM

## 2022-06-23 NOTE — TELEPHONE ENCOUNTER
Routing refill request to provider for review/approval because:  Labs out of range:  BP    BP Readings from Last 3 Encounters:   04/06/22 (!) 144/71   11/03/21 133/67   10/27/21 132/74     Pete PINEDA RN

## 2022-06-24 RX ORDER — AMLODIPINE BESYLATE 10 MG/1
TABLET ORAL
Qty: 90 TABLET | Refills: 0 | Status: SHIPPED | OUTPATIENT
Start: 2022-06-24 | End: 2022-09-27

## 2022-06-25 ENCOUNTER — HEALTH MAINTENANCE LETTER (OUTPATIENT)
Age: 67
End: 2022-06-25

## 2022-07-26 DIAGNOSIS — E11.65 TYPE 2 DIABETES MELLITUS WITH HYPERGLYCEMIA, WITHOUT LONG-TERM CURRENT USE OF INSULIN (H): ICD-10-CM

## 2022-07-28 RX ORDER — GLIPIZIDE 10 MG/1
TABLET, FILM COATED, EXTENDED RELEASE ORAL
Qty: 180 TABLET | Refills: 0 | Status: SHIPPED | OUTPATIENT
Start: 2022-07-28 | End: 2022-10-06

## 2022-07-28 NOTE — TELEPHONE ENCOUNTER
Patient calls for refill.   Pending Prescriptions:                       Disp   Refills    glipiZIDE (GLUCOTROL XL) 10 MG 24 hr tabl*180 ta*0            Sig: TAKE TWO TABLETS BY MOUTH ONCE DAILY  Is in process obtaining Medicare insurance. At this time does not have coverage. Agrees to schedule diabetes visit with PCP 8/31/22. Declines to schedule endocrinology follow up.   Prescription approved per [facility] Refill Protocol.  Abhinav Chery RN - Patient Advocate and Liaison (PAL)

## 2022-08-11 DIAGNOSIS — E11.65 TYPE 2 DIABETES MELLITUS WITH HYPERGLYCEMIA, WITHOUT LONG-TERM CURRENT USE OF INSULIN (H): ICD-10-CM

## 2022-08-13 NOTE — TELEPHONE ENCOUNTER
Routing refill request to provider for review/approval because:  Labs not current:  HgbA1C in past 3 or 6 months  Has been given emil     Visit scheduled for 8/31/2022 - future lab orders in epic from juan Sheffield Registered Nurse  [facility]

## 2022-08-15 ENCOUNTER — TELEPHONE (OUTPATIENT)
Dept: ENDOCRINOLOGY | Facility: CLINIC | Age: 67
End: 2022-08-15

## 2022-08-15 RX ORDER — LIRAGLUTIDE 6 MG/ML
INJECTION SUBCUTANEOUS
Qty: 27 ML | Refills: 0 | Status: SHIPPED | OUTPATIENT
Start: 2022-08-15 | End: 2023-03-07

## 2022-08-15 NOTE — TELEPHONE ENCOUNTER
Hello,     Patient has been on Jardiance. Recently, he lost insurance coverage and Jardiance copay is over $400 for 30 day supply.     A pharmacy liaison informed me that there is an e-voucher for a newer SGLT-2 inhibitor, Steglatro, and I want to consult you to see if this can be a good alternative.     Please review this information and send us a new prescription if this is a good option. Thank you very much.       Thank you,  Keaton Martinez, PharmD  [facility]

## 2022-08-23 ENCOUNTER — TELEPHONE (OUTPATIENT)
Dept: FAMILY MEDICINE | Facility: CLINIC | Age: 67
End: 2022-08-23

## 2022-08-23 NOTE — TELEPHONE ENCOUNTER
3 page form. [employer] Patient Assistance Program Application. Faxed to Jose Lopez per Dr. Danna Montoya [phone]. Number on form is [phone]. Sent to syd. Ruth Behrens.

## 2022-08-23 NOTE — TELEPHONE ENCOUNTER
Patient called and left me a message about how to fill out the PAP forms. Returned patients call. Went over forms with Eric. I told him to call back with any more questions. He will get the form in the mail today. Provider portion complete and ready to be submitted.

## 2022-08-29 NOTE — TELEPHONE ENCOUNTER
Patient forms back. Eric is missing one signature on the second page of the Paulette Nordisk form on the bottom and he also needs to submit an income verification. Included all the different income verification options in the patient letter that will be mailed Thursday.

## 2022-09-06 DIAGNOSIS — E78.5 HYPERLIPIDEMIA LDL GOAL <70: ICD-10-CM

## 2022-09-06 DIAGNOSIS — I10 ESSENTIAL HYPERTENSION WITH GOAL BLOOD PRESSURE LESS THAN 140/90: ICD-10-CM

## 2022-09-07 ENCOUNTER — PATIENT OUTREACH (OUTPATIENT)
Dept: FAMILY MEDICINE | Facility: CLINIC | Age: 67
End: 2022-09-07

## 2022-09-07 RX ORDER — FLURBIPROFEN SODIUM 0.3 MG/ML
SOLUTION/ DROPS OPHTHALMIC
Qty: 100 EACH | Refills: 1 | Status: SHIPPED | OUTPATIENT
Start: 2022-09-07 | End: 2023-04-03

## 2022-09-07 NOTE — TELEPHONE ENCOUNTER
Prescription approved per [facility] Refill Protocol.  Ana Maria Boyle, RN, BSN, PHN  [facility]

## 2022-09-07 NOTE — TELEPHONE ENCOUNTER
Pt requesting a list of the medications he is prescribed be left at the .   -Returned pt call and advised it is at the  for him.   -Provided clinic hours.   -Pt also needs refill of insulin pen needles, this was filled on 9/7/22    Ana Maria Boyle RN, BSN, PHN  [facility]

## 2022-09-13 ENCOUNTER — OFFICE VISIT (OUTPATIENT)
Dept: FAMILY MEDICINE | Facility: CLINIC | Age: 67
End: 2022-09-13

## 2022-09-13 VITALS
DIASTOLIC BLOOD PRESSURE: 72 MMHG | RESPIRATION RATE: 20 BRPM | BODY MASS INDEX: 39.14 KG/M2 | OXYGEN SATURATION: 95 % | TEMPERATURE: 98 F | SYSTOLIC BLOOD PRESSURE: 124 MMHG | HEIGHT: 72 IN | WEIGHT: 289 LBS | HEART RATE: 108 BPM

## 2022-09-13 DIAGNOSIS — E11.65 TYPE 2 DIABETES MELLITUS WITH HYPERGLYCEMIA, WITHOUT LONG-TERM CURRENT USE OF INSULIN (H): ICD-10-CM

## 2022-09-13 DIAGNOSIS — G47.33 OSA (OBSTRUCTIVE SLEEP APNEA): ICD-10-CM

## 2022-09-13 DIAGNOSIS — Z01.818 PREOP GENERAL PHYSICAL EXAM: Primary | ICD-10-CM
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
## 2022-09-13 DIAGNOSIS — I77.1 STENOSIS OF LEFT SUBCLAVIAN ARTERY (H): ICD-10-CM

## 2022-09-13 DIAGNOSIS — E66.01 MORBID OBESITY (H): ICD-10-CM

## 2022-09-13 DIAGNOSIS — Z76.89 HEALTH CARE HOME: ICD-10-CM

## 2022-09-13 DIAGNOSIS — I10 ESSENTIAL HYPERTENSION WITH GOAL BLOOD PRESSURE LESS THAN 140/90: ICD-10-CM

## 2022-09-13 DIAGNOSIS — S49.92XD INJURY OF LEFT SHOULDER, SUBSEQUENT ENCOUNTER: ICD-10-CM

## 2022-09-13 DIAGNOSIS — M75.122 COMPLETE TEAR OF LEFT ROTATOR CUFF, UNSPECIFIED WHETHER TRAUMATIC: ICD-10-CM

## 2022-09-13 LAB
% GRANULOCYTES: 70.2 %
BUN SERPL-MCNC: 33 MG/DL (ref 7–25)
BUN/CREATININE RATIO: 24.3 (ref 6–22)
CALCIUM SERPL-MCNC: 8.6 MG/DL (ref 8.6–10.3)
CHLORIDE SERPLBLD-SCNC: ABNORMAL MMOL/L (ref 98–110)
CO2 SERPL-SCNC: 23.8 MMOL/L (ref 20–32)
CREAT SERPL-MCNC: 1.34 MG/DL (ref 0.6–1.3)
GFR SERPL CREATININE-BSD FRML MDRD: 58 ML/MIN/1.73M2
GLUCOSE SERPL-MCNC: 204 MG/DL (ref 60–99)
HBA1C MFR BLD: 8.5 % (ref 4–7)
HCT VFR BLD AUTO: 43.5 % (ref 40–53)
HEMOGLOBIN: 13.8 G/DL (ref 13.3–17.7)
LYMPHOCYTES NFR BLD AUTO: 20.4 %
MCH RBC QN AUTO: 30.7 PG (ref 26–33)
MCHC RBC AUTO-ENTMCNC: 31.7 G/DL (ref 31–36)
MCV RBC AUTO: 96.8 FL (ref 78–100)
MONOCYTES NFR BLD AUTO: 9.4 %
PLATELET COUNT - QUEST: 313 10^9/L (ref 150–375)
POTASSIUM SERPL-SCNC: 4.81 MMOL/L (ref 3.5–5.3)
RBC # BLD AUTO: 4.49 10*12/L (ref 4.4–5.9)
SODIUM SERPL-SCNC: 138.1 MMOL/L (ref 135–146)
WBC # BLD AUTO: 10.8 10*9/L (ref 4–11)

## 2022-09-13 PROCEDURE — 80048 BASIC METABOLIC PNL TOTAL CA: CPT | Performed by: STUDENT IN AN ORGANIZED HEALTH CARE EDUCATION/TRAINING PROGRAM

## 2022-09-13 PROCEDURE — 93000 ELECTROCARDIOGRAM COMPLETE: CPT | Performed by: STUDENT IN AN ORGANIZED HEALTH CARE EDUCATION/TRAINING PROGRAM

## 2022-09-13 PROCEDURE — 85025 COMPLETE CBC W/AUTO DIFF WBC: CPT | Performed by: STUDENT IN AN ORGANIZED HEALTH CARE EDUCATION/TRAINING PROGRAM

## 2022-09-13 PROCEDURE — 99215 OFFICE O/P EST HI 40 MIN: CPT | Mod: 25 | Performed by: STUDENT IN AN ORGANIZED HEALTH CARE EDUCATION/TRAINING PROGRAM

## 2022-09-13 PROCEDURE — 36415 COLL VENOUS BLD VENIPUNCTURE: CPT | Performed by: STUDENT IN AN ORGANIZED HEALTH CARE EDUCATION/TRAINING PROGRAM

## 2022-09-13 PROCEDURE — 83036 HEMOGLOBIN GLYCOSYLATED A1C: CPT | Performed by: STUDENT IN AN ORGANIZED HEALTH CARE EDUCATION/TRAINING PROGRAM

## 2022-09-13 NOTE — NURSING NOTE
Chief Complaint   Patient presents with     New Patient     New patient to this clinic      Work Comp     DOI 2/23/21, fell on ice at work and tore rotator cuff of left shoulder and hit head      Pre-Op Exam     DOS 9/19/22, left shoulder surgery being done by Dr. Askew with [facility], surgery is being done at [facility]      Patient is here for workers comp visit today.    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     ER or Hospital Admission from incident: Ended up going to [facility] ER where he was accessed. They did check his head for concussion but was told this was ok. He did tear his rotator cuff in his left shoulder and ended up having surgery done by Dr. Askew at [facility].     Report filed at work: Yes    Paperwork to be done: Will be determined by  but basic paperwork was filled out today at the front.    Follow up needed:This will be determined by Dr. Elizondo today-he was told by Dr. Askew at [facility] that his shoulder needs to be redone so he is having surgery on 9/19 and is here for the preop for this to be done and get the ok.

## 2022-09-13 NOTE — PATIENT INSTRUCTIONS
- aspirin: Discontinue aspirin 7 days prior to procedure to reduce bleeding risk. It should be resumed postoperatively.    - Jardiance: HOLD 3 days before surgery.    - hydrochlorothiazide: HOLD on the day of surgery.   - metformin: HOLD day of surgery.   - glipizide: HOLD day of surgery   - Actos: HOLD day of surgery.   - Victoza: HOLD day of surgery     Hold any vitamins or supplements for one week prior to surgery.    Ok to take other prescriptions as you normally do, including on day of surgery with water.     Call with any questions or concerns

## 2022-09-13 NOTE — TELEPHONE ENCOUNTER
I called and LM to follow up with Eric about the needed zero income verification needed by [employer] to complete the application. If he's comfortable filling it out, he can send it back to me. Otherwise I let him know we can choose to not submit an application to [employer] for his Victoza.

## 2022-09-13 NOTE — PROGRESS NOTES
[facility]  [address] [place] [address]  [address]  [facility] [address]  Phone: [phone]  Fax: [phone]  Primary Provider: Dimas Vera  Pre-op Performing Provider: RENA JACKSON    PREOPERATIVE EVALUATION:  Today's date: 9/13/2022    Francois Fontana is a 67 year old male who presents for a preoperative evaluation.    Surgical Information:  Surgery/Procedure: Left shoulder surgery   Surgery Location: [facility] surgery center  Surgeon: Dr. Askew  Surgery Date: 9/19/2022  Time of Surgery: TBD  Where patient plans to recover: At home with family  Fax number for surgical facility:     Type of Anesthesia Anticipated: to be determined    Assessment & Plan     The proposed surgical procedure is considered INTERMEDIATE risk.    Preop general physical exam  Injury of left shoulder, subsequent encounter  Work related injury  Complete tear of left rotator cuff, unspecified whether traumatic  Morbid obesity  Type 2 diabetes mellitus with hyperglycemia, without long-term current use of insulin (H)  Essential hypertension  - HEMOGLOBIN A1C (BFP)  - VENOUS COLLECTION  - Basic Metabolic Panel (BFP)  - HEMOGRAM PLATELET DIFF (BFP)  - EKG 12-lead complete w/read - Clinics  Stenosis of left subclavian artery (H)  Hx 2010, denies sx  JUSTIN (obstructive sleep apnea)  On CPAP    Risks and Recommendations:  The patient has the following additional risks and recommendations for perioperative complications:   - No identified additional risk factors other than previously addressed    Medication Instructions:  Patient Instructions   Labs will come back later today    Cardiology reviewing EKG     - aspirin: Discontinue aspirin 7 days prior to procedure to reduce bleeding risk. It should be resumed postoperatively.    - Jardiance: HOLD 3 days before surgery.    - hydrochlorothiazide: HOLD on the day of surgery.   - metformin: HOLD day of surgery.   - glipizide: HOLD day of surgery   - Actos: HOLD day of surgery.   -  Victoza: HOLD day of surgery     Hold any vitamins or supplements for one week prior to surgery.    Ok to take other prescriptions as you normally do, including on day of surgery with water.     Call with any questions or concerns       RECOMMENDATION:  APPROVAL GIVEN to proceed with proposed procedure, without further diagnostic evaluation.  Overdue for routine diabetic follow-up with endocrinology.     45 minutes spent on the date of the encounter doing chart review, history and exam, documentation and further activities per the note.    Nba Elizondo MD, [facility]          Subjective     HPI related to upcoming procedure: work related L shoulder injury 2/23/2021, s/p RTC repair in 2021 with ongoing issues.      1. No - Have you ever had a heart attack or stroke?  2. No - Have you ever had surgery on your heart or blood vessels, such as a stent, coronary (heart) bypass, or surgery on an artery in the head, neck, heart, or legs?  3. No - Do you have chest pain when you are physically active?  4. No - Do you have a history of heart failure?  5. No - Do you currently have a cold, bronchitis, or symptoms of other respiratory (head and chest) infections?  6. No - Do you have a cough, shortness of breath, or wheezing?  7. No - Do you or anyone in your family have a history of blood clots?  8. No - Do you or anyone in your family have a serious bleeding problem, such as long-lasting bleeding after surgeries or cuts?  9. No - Have you ever had anemia or been told to take iron pills?  10. No - Have you had any abnormal blood loss such as black, tarry or bloody stools, or abnormal vaginal bleeding?  11. No - Have you ever had a blood transfusion?  12. Yes - Are you willing to have a blood transfusion if it is medically needed before, during, or after your surgery?  13. No - Have you or anyone in your family ever had problems with anesthesia (sedation for surgery)?  14. Yes - Do you have sleep  apnea, excessive snoring, or daytime drowsiness? Yes Do you have a CPAP machine? Yes  15. No - Do you have any artifical heart valves or other implanted medical devices, such as a pacemaker, defibrillator, or continuous glucose monitor?  16. No - Do you have any artifical joints?  17. No - Are you allergic to latex?    Health Care Directive:  Patient does not have a Health Care Directive or Living Will: Discussed advance care planning with patient; information given to patient to review.      Status of Chronic Conditions:  See problem list for active medical problems.  Problems all longstanding and stable, except as noted/documented.  See ROS for pertinent symptoms related to these conditions.      Review of Systems  12 point ROS performed and negative for new concerns except as mentioned     Patient Active Problem List    Diagnosis Date Noted     Essential hypertension with goal blood pressure less than 140/90 02/14/2003     Priority: High     Type 2 diabetes mellitus with hyperglycemia; A1c goal <7 07/22/2002     Priority: High     Dx Counts include 234 beds at the [facility]       Health Care Home 09/13/2022     Priority: Medium     Bilateral incipient cataracts 08/14/2020     Priority: Medium     Type 2 diabetes mellitus with diabetic nephropathy (H) 11/20/2015     Priority: Medium     JUSTIN (obstructive sleep apnea) 09/22/2015     Priority: Medium     Family history of prostate cancer in father  10/05/2012     Priority: Medium     Morbid obesity due to excess calories (H) 10/05/2012     Priority: Medium     Advanced directives, counseling/discussion 08/25/2011     Priority: Medium     Advance Directive Problem List Overview:   Name Relationship Phone    Primary Health Care Agent            Alternative Health Care Agent          Discussed advance care planning with patient; information given to patient to review. 8/25/2011     Follow up to [facility] referral call placed. Patient would like to review document provided at visit, establish an  agent and then will schedule facilitation if needed. Key Obando, JOSH/Advanced Healthcare Planning Facilitator   9/9/11           Stenosis of left subclavian artery (H) 11/30/2010     Priority: Medium     Dr Gonzales consult - 2010 -recommended: reconsult if any symptoms develop       Hyperlipidemia LDL goal <70 10/31/2010     Priority: Medium     Rosacea 07/13/2004     Priority: Medium      Past Medical History:   Diagnosis Date     Diabetes mellitus, type 2 (H)      Hyperlipidemia      Rosacea      Stenosis of left subclavian artery (H)      Type II or unspecified type diabetes mellitus without mention of complication, not stated as uncontrolled     Abstracted 07/18/02     Unspecified essential hypertension      Past Surgical History:   Procedure Laterality Date     HERNIA REPAIR, INGUINAL RT/LT  1993    right     Current Outpatient Medications   Medication Sig Dispense Refill     alcohol swab prep pads Use to swab area of injection/jorge as directed. 100 each 3     amLODIPine (NORVASC) 10 MG tablet TAKE ONE TABLET BY MOUTH ONCE DAILY 90 tablet 0     aspirin 81 MG EC tablet Take 81 mg by mouth daily       B-D U/F insulin pen needle USE ONE PEN NEEDLES DAILY OR AS DIRECTED. 100 each 1     blood glucose (ACCU-CHEK SMARTVIEW) test strip Use to test blood sugar 2-3 times daily or as directed. 300 strip 3     blood glucose calibration (NO BRAND SPECIFIED) solution To accompany: Blood Glucose Monitor Brands: per insurance. 1 each 0     blood glucose monitoring (NO BRAND SPECIFIED) meter device kit Use to test blood sugar 3 times daily or as directed. Preferred blood glucose meter OR supplies to accompany: Blood Glucose Monitor Brands: per insurance. 1 kit 0     doxycycline hyclate (VIBRAMYCIN) 100 MG capsule TAKE 1 CAPSULE BY MOUTH 2 TIMES DAILY 180 capsule 3     glipiZIDE (GLUCOTROL XL) 10 MG 24 hr tablet TAKE TWO TABLETS BY MOUTH ONCE DAILY 180 tablet 0     hydrochlorothiazide (HYDRODIURIL) 25 MG tablet TAKE ONE  TABLET BY MOUTH ONCE DAILY 90 tablet 2     JARDIANCE 25 MG TABS tablet TAKE 1 TABLET (25 MG) BY MOUTH DAILY 30 tablet 5     metFORMIN (GLUCOPHAGE) 1000 MG tablet Take 1 tablet (1,000 mg) by mouth 2 times daily (with meals) 180 tablet 3     order for DME Patient Francois Fontana was set up at [place] on 2015. Patient received a [employer] DreamStation Auto CPAP Auto. Pressures were set at Auto 6 - 12 cm H2O.   Patient s ramp is 5 cm H2O for 30 min and FLEX/EPR is A Flex.  Patient received a [employer] Mask name: Wisp  Nasal mask Size Small, Medium, Large, heated tubing and heated humidifier.  Patient is enrolled in the STM Program and does need to meet compliance. Patient has a follow up on 11/4/15 with Bennett Goltz, PA.     Myra Lim (entered by Nitin Wilson)       pioglitazone (ACTOS) 45 MG tablet TAKE ONE TABLET BY MOUTH ONCE DAILY 90 tablet 0     simvastatin (ZOCOR) 20 MG tablet Take 1 tablet (20 mg) by mouth At Bedtime 90 tablet 3     thin (NO BRAND SPECIFIED) lancets Use with lanceting device. To accompany: Blood Glucose Monitor Brands: per insurance. 200 each 3     traMADol (ULTRAM) 50 MG tablet  (Patient not taking: Reported on 2022)       valsartan (DIOVAN) 160 MG tablet Take 1 tablet (160 mg) by mouth daily 90 tablet 3     VICTOZA PEN 18 MG/3ML soln INJECT 1.8MG SUBCUTANOUSLY ONCE DAILY 27 mL 0       No Known Allergies     Social History     Tobacco Use     Smoking status: Former Smoker     Quit date: 1985     Years since quittin.1     Smokeless tobacco: Never Used     Tobacco comment: quit    Substance Use Topics     Alcohol use: No     Alcohol/week: 0.0 standard drinks     Comment: quit drinking age 30     Family History   Problem Relation Age of Onset     Cancer Father          of lung cancer     C.A.D. Father         Mult bypass operations     Diabetes Type 2  Father      Prostate Cancer Father      Respiratory Mother          of COPD      Gastrointestinal Disease Brother         Hemochromatosis     Gastrointestinal Disease Brother         Hemochromatosis     Gastrointestinal Disease Brother         Hemochromatosis ( in 3 bros)     History   Drug Use No         Objective     /72 (BP Location: Right arm, Patient Position: Sitting, Cuff Size: Adult Large)   Pulse 108   Temp 98  F (36.7  C) (Temporal)   Resp 20   Ht 1.829 m (6')   Wt 131.1 kg (289 lb)   SpO2 95%   BMI 39.20 kg/m      Physical Exam  GENERAL APPEARANCE: healthy, alert and no distress  EYES: Eyes grossly normal to inspection, PERRL and conjunctivae and sclerae normal  HENT: ear canals and TM's normal and nose and mouth without ulcers or lesions  HENT: ear canals and TM's normal and nose and mouth without ulcers or lesions  RESP: lungs clear to auscultation - no rales, rhonchi or wheezes  CV: borderline tachy, regular rhythm, normal S1 S2, no S3 or S4 and no murmur, click or rub   ABDOMEN: soft, nontender, no HSM or masses and bowel sounds normal  NEURO: Normal strength and tone, sensory exam grossly normal, mentation intact and speech normal  PSYCH: mentation appears normal and affect normal/bright    Diagnostics:  Results for orders placed or performed in visit on 09/13/22   HEMOGLOBIN A1C (BFP)     Status: Abnormal   Result Value Ref Range    Hemoglobin A1C 8.5 (A) 4.0 - 7.0 %   HEMOGRAM PLATELET DIFF (BFP)     Status: None   Result Value Ref Range    WBC 10.8 4.0 - 11 10*9/L    RBC Count 4.49 4.4 - 5.9 10*12/L    Hemoglobin 13.8 13.3 - 17.7 g/dL    Hematocrit 43.5 40.0 - 53.0 %    MCV 96.8 78 - 100 fL    MCH 30.7 26 - 33 pg    MCHC 31.7 31 - 36 g/dL    Platelet Count 313 150 - 375 10^9/L    % Granulocytes 70.2 %    % Lymphocytes 20.4 %    % Monocytes 9.4 %       EKG: sinus tachycardia, normal axis, normal intervals, no acute ST/T changes c/w ischemia, unchanged from previous tracings    Revised Cardiac Risk Index (RCRI):  The patient has the following serious  cardiovascular risks for perioperative complications:   - No serious cardiac risks = 0 points     RCRI Interpretation: 1 point: Class II (low risk - 0.9% complication rate)         Signed Electronically by: Nba Elizondo MD  Copy of this evaluation report is provided to requesting physician.

## 2022-09-13 NOTE — LETTER
[facility]  [address]  [address]  [facility] [address]  [phone]      September 13, 2022      Francois Fontana  [address] [facility] [address]      EMERGENCY CARE PLAN  Presenting Problem Treatment Plan   Questions or concerns during clinic hours I will call the clinic directly:    [facility]  [address] , [address]  [place] [address]  [phone]   Questions or concerns outside clinic hours  I will call the 24 hour line at [phone]   Patient needs to schedule an appointment  I will call the  scheduling line at [phone]   Same day treatment   I will call the clinic first, then  urgent care and/or  express care if needed   Clinic Care Coordinators Marlin Alcaraz RN:  [phone]  [facility] Clinical Support Staff: [phone]    Crisis Services:  Behavioral or Mental Health P (Behavioral Health Providers)   [phone]   Emergency treatment--Immediately CALL 511

## 2022-09-14 NOTE — TELEPHONE ENCOUNTER
"Called and spoke to Eric. Eric received the statement of zero income but he says he has no proof he doesn't have income so he doesn't want to fill it out. He says he does get (worker's comp or unemployment, I forget) but he doesn't have any sort of statement or information from them stating he gets it. He says he worked 2 months in 2021 so he has a \"w-4 or w-2.\" but he didn't send it in because it's only 2 months of work. I explained that it doesn't matter how long he worked, we are just needing the form to be able to enroll him. He said he got his part B card in the mail and he's looking for a  to help sign up for an advantage plan. I told him that some patients still find themselves in the donut hole at some point with an advantage plan and their medications can become very costly. He said he's going to look for his W-2 and send it to me via mail because he doesn't know how to operate a computer or use fax machines. He said it may take a while because he's supposed to have shoulder surgery and said he might be unable to talk next week. Will follow up in 2 weeks.   "

## 2022-09-20 NOTE — TELEPHONE ENCOUNTER
Francois turned in his income information. I submitted it to [employer]. I called today to verify they received and they did. However, the patient application is missing a signature. Sent new patient application to Eric via mail.

## 2022-09-26 DIAGNOSIS — I10 ESSENTIAL HYPERTENSION WITH GOAL BLOOD PRESSURE LESS THAN 140/90: ICD-10-CM

## 2022-09-27 RX ORDER — HYDROCHLOROTHIAZIDE 25 MG/1
TABLET ORAL
Qty: 90 TABLET | Refills: 0 | Status: SHIPPED | OUTPATIENT
Start: 2022-09-27 | End: 2023-05-16

## 2022-09-27 RX ORDER — AMLODIPINE BESYLATE 10 MG/1
TABLET ORAL
Qty: 90 TABLET | Refills: 0 | Status: SHIPPED | OUTPATIENT
Start: 2022-09-27 | End: 2023-05-16

## 2022-09-27 NOTE — TELEPHONE ENCOUNTER
Routing refill request to provider for review/approval because:  Labs out of range:  Creatinine    Rosio Nieves RN

## 2022-09-28 DIAGNOSIS — E78.5 HYPERLIPIDEMIA LDL GOAL <70: ICD-10-CM

## 2022-09-28 DIAGNOSIS — I10 HYPERTENSION GOAL BP (BLOOD PRESSURE) < 140/90: ICD-10-CM

## 2022-09-28 DIAGNOSIS — E11.65 TYPE 2 DIABETES MELLITUS WITH HYPERGLYCEMIA, WITHOUT LONG-TERM CURRENT USE OF INSULIN (H): ICD-10-CM

## 2022-09-29 RX ORDER — GLIPIZIDE 10 MG/1
TABLET, FILM COATED, EXTENDED RELEASE ORAL
Qty: 180 TABLET | Refills: 0
Start: 2022-09-29

## 2022-09-29 RX ORDER — SIMVASTATIN 20 MG
20 TABLET ORAL AT BEDTIME
Qty: 90 TABLET | Refills: 3
Start: 2022-09-29

## 2022-09-29 RX ORDER — VALSARTAN 160 MG/1
160 TABLET ORAL DAILY
Qty: 90 TABLET | Refills: 3
Start: 2022-09-29

## 2022-09-29 RX ORDER — PIOGLITAZONEHYDROCHLORIDE 45 MG/1
TABLET ORAL
Qty: 90 TABLET | Refills: 0
Start: 2022-09-29

## 2022-10-01 ENCOUNTER — TELEPHONE (OUTPATIENT)
Dept: ENDOCRINOLOGY | Facility: CLINIC | Age: 67
End: 2022-10-01

## 2022-10-01 DIAGNOSIS — E11.65 TYPE 2 DIABETES MELLITUS WITH HYPERGLYCEMIA, WITHOUT LONG-TERM CURRENT USE OF INSULIN (H): ICD-10-CM

## 2022-10-03 RX ORDER — PROCHLORPERAZINE 25 MG/1
SUPPOSITORY RECTAL
Qty: 1 EACH | Refills: 1 | Status: SHIPPED | OUTPATIENT
Start: 2022-10-03 | End: 2023-08-03

## 2022-10-03 RX ORDER — PROCHLORPERAZINE 25 MG/1
SUPPOSITORY RECTAL
Qty: 9 EACH | Refills: 1 | Status: SHIPPED | OUTPATIENT
Start: 2022-10-03 | End: 2023-08-03

## 2022-10-03 NOTE — TELEPHONE ENCOUNTER
Appointment with Dr. Doshi on 03.02.2023 w/Labs 1 week prior.    Patient is also interested in a Free Style Tre or Dexcom and would like Dr. Doshi to put in a prescription. He does not have insurance coverage right now and can't afford to be seen in the office by a Diabetes Educator. He said that he's seen the commercials on TV, it looks pretty easy to use/place on his own and would like to obtain one.

## 2022-10-03 NOTE — TELEPHONE ENCOUNTER
Called and LM for Eric to see if he received the paperwork last week and to see if he needed anything from me. We have everything we need for [employer] but it was missing a signature on the application so I had to send a new form.

## 2022-10-03 NOTE — TELEPHONE ENCOUNTER
Called DAVIS Pereira. Eric marked his income on the application as $40k/yr but only showed a proof if income for $9,000. They said he needs to resubmit the paperwork or resubmit proof of income so they're both the same number.

## 2022-10-04 ENCOUNTER — TELEPHONE (OUTPATIENT)
Dept: ENDOCRINOLOGY | Facility: CLINIC | Age: 67
End: 2022-10-04

## 2022-10-04 DIAGNOSIS — E11.21 TYPE 2 DIABETES MELLITUS WITH DIABETIC NEPHROPATHY, WITHOUT LONG-TERM CURRENT USE OF INSULIN (H): ICD-10-CM

## 2022-10-04 DIAGNOSIS — E11.65 TYPE 2 DIABETES MELLITUS WITH HYPERGLYCEMIA, WITHOUT LONG-TERM CURRENT USE OF INSULIN (H): ICD-10-CM

## 2022-10-06 RX ORDER — PIOGLITAZONEHYDROCHLORIDE 45 MG/1
45 TABLET ORAL DAILY
Qty: 90 TABLET | Refills: 1 | Status: SHIPPED | OUTPATIENT
Start: 2022-10-06 | End: 2023-04-03

## 2022-10-06 RX ORDER — GLIPIZIDE 10 MG/1
20 TABLET, FILM COATED, EXTENDED RELEASE ORAL DAILY
Qty: 180 TABLET | Refills: 0 | Status: SHIPPED | OUTPATIENT
Start: 2022-10-06 | End: 2023-02-06

## 2022-10-06 NOTE — TELEPHONE ENCOUNTER
Spoke to Eric. He requested me to send the application via e-mail again and he would print it off, sign it and bring it with him to the clinic tomorrow for them to fax it to me. He said as for his BI application, he doesn't wish to proceed with it right now because he says the program is confused about his income and he said he got workers comp but that's not counted as income. I let him know that I ran a test claim under his new part D plan and his Jardiance is $143/month. He said he might call them but he will for sure get the application finished for the Victoza.

## 2022-10-06 NOTE — TELEPHONE ENCOUNTER
Pt called wanting to know if Jardiance, Glipizide and Pioglitazone would be able to be signed off for before the weekend. Please follow up. Thank you

## 2022-10-14 NOTE — TELEPHONE ENCOUNTER
Called and spoke with Eric. He wants me to fax the patient application to his [facility] in [place]. Fax confirmed sent at 10:04 AM. Eric is needing to provide a 1040 form in order to be accepted into the program. Both programs said that it HAS to be the form of income verification due to his w-2 not aligning with reported income and not including his spouses' income. Eric says that he hasn't filed taxes in years but he will try to find a tax office that's open that he would be able to file taxes with. Otherwise, we discussed waiting until he files taxes next year to get enrolled. He has high co-pays on both Ozempic and Victoza.     We discussed that unfortunately there are no savings cards for Part D patients for Victoza or Jardiance. He mentioned that his pharmacy gave him something about insurance enrollment and they said they would help him find an insurance. I emphasized that they would be a great resource to help find a insurance plan with better coverage for the medications that he is taking. He said he will discuss that with them today.     Eric will give me a call once he gets the 1040 tax forms or enrolled in a different insurance. I will wait until Eric follows up with how he would like to proceed.

## 2022-12-05 NOTE — TELEPHONE ENCOUNTER
Have not heard back from Ray with the current tax forms. He is waiting until 2023 when he usually files his taxes to provider the income verifications since it's needed for both programs. Will follow up in January.

## 2023-02-06 DIAGNOSIS — E11.65 TYPE 2 DIABETES MELLITUS WITH HYPERGLYCEMIA, WITHOUT LONG-TERM CURRENT USE OF INSULIN (H): ICD-10-CM

## 2023-02-06 DIAGNOSIS — E11.21 TYPE 2 DIABETES MELLITUS WITH DIABETIC NEPHROPATHY, WITHOUT LONG-TERM CURRENT USE OF INSULIN (H): ICD-10-CM

## 2023-02-06 RX ORDER — GLIPIZIDE 10 MG/1
TABLET, FILM COATED, EXTENDED RELEASE ORAL
Qty: 60 TABLET | Refills: 0 | Status: SHIPPED | OUTPATIENT
Start: 2023-02-06 | End: 2023-03-07

## 2023-02-22 ENCOUNTER — TELEPHONE (OUTPATIENT)
Dept: ENDOCRINOLOGY | Facility: CLINIC | Age: 68
End: 2023-02-22
Payer: COMMERCIAL

## 2023-02-22 ENCOUNTER — TELEPHONE (OUTPATIENT)
Dept: FAMILY MEDICINE | Facility: CLINIC | Age: 68
End: 2023-02-22
Payer: COMMERCIAL

## 2023-02-22 NOTE — TELEPHONE ENCOUNTER
[facility] Call Center    Phone Message    May a detailed message be left on voicemail: yes     Reason for Call: Medication Refill Request    Has the patient contacted the pharmacy for the refill? Yes   Name of medication being requested:VICTOZA PEN 18 MG/3ML soln [457779]  empagliflozin (JARDIANCE) 25 MG TABS tablet [[id number]]  metFORMIN (GLUCOPHAGE) 1000 MG tablet [[id number]]  pioglitazone (ACTOS) 45 MG tablet [[id number]]   glipiZIDE (GLUCOTROL XL) 10 MG 24 hr tablet [[id number]]    Provider who prescribed the medication: Dr Doshi  Pharmacy: [place], [place] - [address] [facility]  Date medication is needed: asap    Patient would also like a call back to discuss patient is requesting information & prescription for a sensor and monitoring system      Action Taken: Other: endo    Travel Screening: Not Applicable

## 2023-02-22 NOTE — TELEPHONE ENCOUNTER
Okay to send refills to last till next appointment.  Try to schedule sooner if there are any sooner openings.  Please keep BRENNAN open.    Lab Results   Component Value Date    A1C 8.5 09/13/2022    A1C 7.4 10/26/2021    A1C 8.6 06/10/2021    A1C 10.4 05/19/2021    A1C 12.0 04/29/2021    A1C 13.1 04/08/2021

## 2023-02-22 NOTE — TELEPHONE ENCOUNTER
Call placed to patient to inquire if he is still being seen in the [facility] system. LMTCB    [facility] establish care visit noted 11/2/2022.     Last OV with [facility] was 10/27/2021, and last preop was 9/13/2022    Will wait for patient to call back.    Henry Bolivar RN on 2/22/2023 at 12:32 PM

## 2023-02-22 NOTE — TELEPHONE ENCOUNTER
LOV:4/2022  Pt not scheduled till 8/2023.  Pt was scheduled for 3/2 and cancelled appt.     Please advise on medication refills or if pt needs to be seen sooner and okay to use BRENNAN.

## 2023-02-24 ENCOUNTER — TELEPHONE (OUTPATIENT)
Dept: INTERNAL MEDICINE | Facility: CLINIC | Age: 68
End: 2023-02-24
Payer: COMMERCIAL

## 2023-02-24 NOTE — TELEPHONE ENCOUNTER
Patient returned call for blood in urine. He stated it has be on going for 7+ days. When asked if it was painful he said a little. Patient declined speaking with an RN today but an appointment was made for Monday 2-

## 2023-02-24 NOTE — TELEPHONE ENCOUNTER
Patient did not want to take the March appointment he is dealing with prostate issues feels that comes first for him right.

## 2023-02-24 NOTE — TELEPHONE ENCOUNTER
Reason for call:  Other   Patient called regarding (reason for call): call back  Additional comments:   PT has prostate concerns. He wants to know the first steps to look into this. He prefers a female Dr and is requesting establish care as well. Please reach out to patient and thank you. He was concerned that waiting for April would be too far out.    Phone number to reach patient:  Cell number on file:    Telephone Information:   Mobile [phone]       Best Time:  any    Can we leave a detailed message on this number?  YES    Travel screening: Not Applicable

## 2023-02-27 ENCOUNTER — OFFICE VISIT (OUTPATIENT)
Dept: INTERNAL MEDICINE | Facility: CLINIC | Age: 68
End: 2023-02-27
Payer: COMMERCIAL

## 2023-02-27 VITALS
SYSTOLIC BLOOD PRESSURE: 138 MMHG | HEART RATE: 96 BPM | WEIGHT: 283 LBS | TEMPERATURE: 98.1 F | HEIGHT: 72 IN | BODY MASS INDEX: 38.33 KG/M2 | OXYGEN SATURATION: 93 % | DIASTOLIC BLOOD PRESSURE: 76 MMHG | RESPIRATION RATE: 20 BRPM

## 2023-02-27 DIAGNOSIS — R31.0 GROSS HEMATURIA: Primary | ICD-10-CM

## 2023-02-27 DIAGNOSIS — Z12.5 SCREENING FOR PROSTATE CANCER: ICD-10-CM

## 2023-02-27 DIAGNOSIS — E11.21 TYPE 2 DIABETES MELLITUS WITH DIABETIC NEPHROPATHY, WITHOUT LONG-TERM CURRENT USE OF INSULIN (H): ICD-10-CM

## 2023-02-27 DIAGNOSIS — E11.65 TYPE 2 DIABETES MELLITUS WITH HYPERGLYCEMIA, WITHOUT LONG-TERM CURRENT USE OF INSULIN (H): ICD-10-CM

## 2023-02-27 LAB
ALBUMIN UR-MCNC: 30 MG/DL
APPEARANCE UR: CLEAR
BACTERIA #/AREA URNS HPF: NORMAL /HPF
BILIRUB UR QL STRIP: NEGATIVE
COLOR UR AUTO: YELLOW
GLUCOSE UR STRIP-MCNC: >=1000 MG/DL
HBA1C MFR BLD: 10 % (ref 0–5.6)
HGB UR QL STRIP: NEGATIVE
KETONES UR STRIP-MCNC: NEGATIVE MG/DL
LEUKOCYTE ESTERASE UR QL STRIP: NEGATIVE
NITRATE UR QL: NEGATIVE
PH UR STRIP: 5 [PH] (ref 5–7)
RBC #/AREA URNS AUTO: NORMAL /HPF
SP GR UR STRIP: 1.02 (ref 1–1.03)
UROBILINOGEN UR STRIP-ACNC: 0.2 E.U./DL
WBC #/AREA URNS AUTO: NORMAL /HPF

## 2023-02-27 PROCEDURE — 99213 OFFICE O/P EST LOW 20 MIN: CPT | Performed by: PHYSICIAN ASSISTANT

## 2023-02-27 PROCEDURE — G0103 PSA SCREENING: HCPCS | Performed by: PHYSICIAN ASSISTANT

## 2023-02-27 PROCEDURE — 83036 HEMOGLOBIN GLYCOSYLATED A1C: CPT | Performed by: PHYSICIAN ASSISTANT

## 2023-02-27 PROCEDURE — 36415 COLL VENOUS BLD VENIPUNCTURE: CPT | Performed by: PHYSICIAN ASSISTANT

## 2023-02-27 PROCEDURE — 81001 URINALYSIS AUTO W/SCOPE: CPT | Performed by: PHYSICIAN ASSISTANT

## 2023-02-27 NOTE — TELEPHONE ENCOUNTER
Please remove the 3/16 hold on the schedule. Please offer the pt a different BRENNAN slot that may work better for him. Thanks

## 2023-02-27 NOTE — PROGRESS NOTES
Assessment & Plan     Blood in urine  No blood in urine on UA today. Advised monitoring for any additional episodes; consider referral to urology if episodes continue. Discussed decreasing aspirin to once daily (81mg total per day). Will be scheduling follow-up with PCP.  Low suspicion for kidney stones or UTI based on UA results and lack of other symptoms.  - UA macro with reflex to Microscopic and Culture - Clinc Collect  - Urine Microscopic    Screening for prostate cancer  Concerned about his prostate due to family history. Previous PSAs have been normal. Discussed that hematuria is not a common presentation of prostate issues, especially without additional symptoms. Check PSA  - PROSTATE SPEC ANTIGEN SCREEN; Future  - PROSTATE SPEC ANTIGEN SCREEN    Type 2 diabetes mellitus with diabetic nephropathy (H)  Wants repeat A1c prior to seeing endocrine. Last A1c with [facility] was 8.3% on 11/2/22.  In process of scheduling appt with endocrine      Ordering of each unique test       No follow-ups on file.    GAVIN Edward [facility] RAVINDER Reyes is a 67 year old accompanied by his spouse, presenting for the following health issues:  Hematuria      History of Present Illness       Reason for visit:  Blood in urine  Symptom onset:  1-2 weeks ago  Symptoms include:  Blood  Symptom intensity:  Mild  Symptom progression:  Staying the same  Had these symptoms before:  No  What makes it worse:  No  What makes it better:  No    He eats 2-3 servings of fruits and vegetables daily.He consumes 0 sweetened beverage(s) daily.He exercises with enough effort to increase his heart rate 10 to 19 minutes per day.  He exercises with enough effort to increase his heart rate 4 days per week.   He is taking medications regularly.     2 weeks ago. Got up during the night to urinate. Says he saw blood on the hand he held his penis in while urinating. No external penile abnormalities  3-4 days ago.  Passed a clot and then urinated. Since then, urine has been a normal color.  Bright red blood when it was present  No increase in frequency  No urgency  No leakage  No dysuria  No swelling    Some R back stiffness  No fevers    Dad with prostate cancer    Former smoker  Started around age 17. Stopped around age 31.  Social smoker (not heavy)    Baby ASA BID  Does take NSAIDs--once day for the past week or so (taking for HA)    No h/o kidney stones    Past Medical History:   Diagnosis Date     Diabetes mellitus, type 2 (H)      Hyperlipidemia      Rosacea      Stenosis of left subclavian artery (H)      Type II or unspecified type diabetes mellitus without mention of complication, not stated as uncontrolled     Abstracted 07/18/02     Unspecified essential hypertension        Review of Systems   Constitutional, HEENT, cardiovascular, pulmonary, gi and gu systems are negative, except as otherwise noted.      Objective    /76   Pulse 96   Temp 98.1  F (36.7  C) (Oral)   Resp 20   Ht 1.829 m (6')   Wt 128.4 kg (283 lb)   SpO2 93%   BMI 38.38 kg/m    Body mass index is 38.38 kg/m .  Physical Exam   GENERAL: healthy, alert and no distress  MS: no gross musculoskeletal defects noted, no edema  SKIN: no suspicious lesions or rashes  PSYCH: mentation appears normal, affect normal/bright    Results for orders placed or performed in visit on 02/27/23 (from the past 24 hour(s))   UA macro with reflex to Microscopic and Culture - Clinc Collect    Specimen: Urine, Midstream   Result Value Ref Range    Color Urine Yellow Colorless, Straw, Light Yellow, Yellow    Appearance Urine Clear Clear    Glucose Urine >=1000 (A) Negative mg/dL    Bilirubin Urine Negative Negative    Ketones Urine Negative Negative mg/dL    Specific Gravity Urine 1.020 1.003 - 1.035    Blood Urine Negative Negative    pH Urine 5.0 5.0 - 7.0    Protein Albumin Urine 30 (A) Negative mg/dL    Urobilinogen Urine 0.2 0.2, 1.0 E.U./dL    Nitrite Urine  Negative Negative    Leukocyte Esterase Urine Negative Negative   Urine Microscopic   Result Value Ref Range    Bacteria Urine None Seen None Seen /HPF    RBC Urine 0-2 0-2 /HPF /HPF    WBC Urine 0-5 0-5 /HPF /HPF    Narrative    Urine Culture not indicated

## 2023-02-27 NOTE — LETTER
[place] [address], [address]  [place], [place] [address] [phone]        March 7, 2023    Francois Fontana  [address] [facility] [address]            Dear  Francoisjose alberto Fontana:    Your recent lab testing was normal, including the prostate test. Please schedule a visit if you notice any additional episodes of bleeding or other concerning symptoms.     Please contact the clinic if you have additional questions.  Thank you.     Sincerely,     Kimberlee Mullen PA-C     Results for orders placed or performed in visit on 02/27/23   Hemoglobin A1c     Status: Abnormal   Result Value Ref Range    Hemoglobin A1C 10.0 (H) 0.0 - 5.6 %    Narrative    Reviewed, ok with previous.      Results for orders placed or performed in visit on 02/27/23   UA macro with reflex to Microscopic and Culture - Clinc Collect     Status: Abnormal    Specimen: Urine, Midstream   Result Value Ref Range    Color Urine Yellow Colorless, Straw, Light Yellow, Yellow    Appearance Urine Clear Clear    Glucose Urine >=1000 (A) Negative mg/dL    Bilirubin Urine Negative Negative    Ketones Urine Negative Negative mg/dL    Specific Gravity Urine 1.020 1.003 - 1.035    Blood Urine Negative Negative    pH Urine 5.0 5.0 - 7.0    Protein Albumin Urine 30 (A) Negative mg/dL    Urobilinogen Urine 0.2 0.2, 1.0 E.U./dL    Nitrite Urine Negative Negative    Leukocyte Esterase Urine Negative Negative   Urine Microscopic     Status: Normal   Result Value Ref Range    Bacteria Urine None Seen None Seen /HPF    RBC Urine 0-2 0-2 /HPF /HPF    WBC Urine 0-5 0-5 /HPF /HPF    Narrative    Urine Culture not indicated   PROSTATE SPEC ANTIGEN SCREEN     Status: Normal   Result Value Ref Range    Prostate Specific Antigen Screen 0.19 0.00 - 4.50 ng/mL    Narrative    This result is obtained using the Roche Elecsys total PSA method on the missy [id number] immunoassay analyzer. Results obtained with different assay methods or kits  cannot be used interchangeably.

## 2023-02-27 NOTE — LETTER
[facility] - [place] [address], [address]  [place], [place] [address] [phone]        2023    Francois Fontana  [address] [facility] [address]            Dear Mr. Francois Fontana:    Labs/ Imaging studies done with endocrinology are attached.   Lab Results        Component                Value               Date                        A1C                      10.0                2023                  A1C                      8.5                 2022                        Follow-up in endocrinology Clinic as scheduled/discussed.       Sincerely,      Dr. Maryam Doshi  -[facility] Endocrinology [phone]    Results for orders placed or performed in visit on 23   Hemoglobin A1c     Status: Abnormal   Result Value Ref Range    Hemoglobin A1C 10.0 (H) 0.0 - 5.6 %    Narrative    Reviewed, ok with previous.      Results for orders placed or performed in visit on 23   UA macro with reflex to Microscopic and Culture - Clinc Collect     Status: Abnormal    Specimen: Urine, Midstream   Result Value Ref Range    Color Urine Yellow Colorless, Straw, Light Yellow, Yellow    Appearance Urine Clear Clear    Glucose Urine >=1000 (A) Negative mg/dL    Bilirubin Urine Negative Negative    Ketones Urine Negative Negative mg/dL    Specific Gravity Urine 1.020 1.003 - 1.035    Blood Urine Negative Negative    pH Urine 5.0 5.0 - 7.0    Protein Albumin Urine 30 (A) Negative mg/dL    Urobilinogen Urine 0.2 0.2, 1.0 E.U./dL    Nitrite Urine Negative Negative    Leukocyte Esterase Urine Negative Negative   Urine Microscopic     Status: Normal   Result Value Ref Range    Bacteria Urine None Seen None Seen /HPF    RBC Urine 0-2 0-2 /HPF /HPF    WBC Urine 0-5 0-5 /HPF /HPF    Narrative    Urine Culture not indicated

## 2023-02-28 LAB — PSA SERPL-MCNC: 0.19 NG/ML (ref 0–4.5)

## 2023-02-28 NOTE — RESULT ENCOUNTER NOTE
Francois    Labs/ Imaging studies done with endocrinology are attached.  Lab Results       Component                Value               Date                       A1C                      10.0                02/27/2023                 A1C                      8.5                 09/13/2022              Follow-up in endocrinology Clinic as scheduled/discussed.     Please send a letter with above lab/test results and above comments.    Thank you.    Maryam Doshi MD

## 2023-03-06 NOTE — RESULT ENCOUNTER NOTE
Please send the patient a letter with his results:    Dear Francois,    Your recent lab testing was normal, including the prostate test. Please schedule a visit if you notice any additional episodes of bleeding or other concerning symptoms.    Please contact the clinic if you have additional questions.  Thank you.    Sincerely,    Kimberlee Mullen PA-C

## 2023-03-07 DIAGNOSIS — E11.21 TYPE 2 DIABETES MELLITUS WITH DIABETIC NEPHROPATHY, WITHOUT LONG-TERM CURRENT USE OF INSULIN (H): ICD-10-CM

## 2023-03-07 DIAGNOSIS — E11.65 TYPE 2 DIABETES MELLITUS WITH HYPERGLYCEMIA, WITHOUT LONG-TERM CURRENT USE OF INSULIN (H): ICD-10-CM

## 2023-03-07 RX ORDER — GLIPIZIDE 10 MG/1
TABLET, FILM COATED, EXTENDED RELEASE ORAL
Qty: 180 TABLET | Refills: 0 | Status: SHIPPED | OUTPATIENT
Start: 2023-03-07 | End: 2023-06-05

## 2023-03-07 RX ORDER — LIRAGLUTIDE 6 MG/ML
1.8 INJECTION SUBCUTANEOUS DAILY
Qty: 27 ML | Refills: 0 | Status: SHIPPED | OUTPATIENT
Start: 2023-03-07 | End: 2023-06-05

## 2023-03-27 DIAGNOSIS — E11.21 TYPE 2 DIABETES MELLITUS WITH DIABETIC NEPHROPATHY, WITHOUT LONG-TERM CURRENT USE OF INSULIN (H): ICD-10-CM

## 2023-03-27 NOTE — TELEPHONE ENCOUNTER
"Requested Prescriptions   Pending Prescriptions Disp Refills     JARDIANCE 25 MG TABS tablet [Pharmacy Med Name: JARDIANCE 25MG TABS] 90 tablet 1     Sig: TAKE ONE TABLET BY MOUTH ONCE DAILY       Sodium Glucose Co-Transport Inhibitor Agents Failed - 3/27/2023 10:53 AM        Failed - Recent (6 mo) or future (30 days) visit within the authorizing provider's specialty     Patient had office visit in the last 6 months or has a visit in the next 30 days with authorizing provider or within the authorizing provider's specialty.  See \"Patient Info\" tab in inbasket, or \"Choose Columns\" in Meds & Orders section of the refill encounter.            Passed - Patient has documented A1c within the specified period of time.     If HgbA1C is 8 or greater, it needs to be on file within the past 3 months.  If less than 8, must be on file within the past 6 months.     Recent Labs   Lab Test 02/27/23  1638   A1C 10.0*             Passed - No creatinine >1.4 or GFR <45 within the past 12 mos     Recent Labs   Lab Test 09/13/22  1516 10/26/21  1044 06/10/21  1409   GFRESTIMATED 58*   < > 55*   GFRESTBLACK  --   --  64    < > = values in this interval not displayed.       Recent Labs   Lab Test 09/13/22  1516   CR 1.34*             Passed - Medication is active on med list        Passed - Patient is age 18 or older        Passed - Patient has documented normal Potassium within the last 12 mos.     Recent Labs   Lab Test 09/13/22  1516   POTASSIUM 4.81                  "

## 2023-03-28 RX ORDER — EMPAGLIFLOZIN 25 MG/1
TABLET, FILM COATED ORAL
Qty: 90 TABLET | Refills: 0 | Status: SHIPPED | OUTPATIENT
Start: 2023-03-28 | End: 2023-06-27

## 2023-04-01 DIAGNOSIS — I10 ESSENTIAL HYPERTENSION WITH GOAL BLOOD PRESSURE LESS THAN 140/90: ICD-10-CM

## 2023-04-01 DIAGNOSIS — E78.5 HYPERLIPIDEMIA LDL GOAL <70: ICD-10-CM

## 2023-04-01 DIAGNOSIS — E11.65 TYPE 2 DIABETES MELLITUS WITH HYPERGLYCEMIA, WITHOUT LONG-TERM CURRENT USE OF INSULIN (H): ICD-10-CM

## 2023-04-01 DIAGNOSIS — E11.21 TYPE 2 DIABETES MELLITUS WITH DIABETIC NEPHROPATHY, WITHOUT LONG-TERM CURRENT USE OF INSULIN (H): ICD-10-CM

## 2023-04-03 DIAGNOSIS — E11.65 TYPE 2 DIABETES MELLITUS WITH HYPERGLYCEMIA, WITHOUT LONG-TERM CURRENT USE OF INSULIN (H): ICD-10-CM

## 2023-04-03 RX ORDER — FLURBIPROFEN SODIUM 0.3 MG/ML
SOLUTION/ DROPS OPHTHALMIC
Qty: 100 EACH | Refills: 0 | Status: SHIPPED | OUTPATIENT
Start: 2023-04-03 | End: 2023-07-05

## 2023-04-03 RX ORDER — PIOGLITAZONEHYDROCHLORIDE 45 MG/1
TABLET ORAL
Qty: 90 TABLET | Refills: 1 | Status: SHIPPED | OUTPATIENT
Start: 2023-04-03 | End: 2023-08-03

## 2023-04-03 NOTE — TELEPHONE ENCOUNTER
"Requested Prescriptions   Pending Prescriptions Disp Refills     B-D U/F insulin pen needle [Pharmacy Med Name: BD PEN NEEDLE MINI  31G X 5 MM MISC]  1     Sig: USE ONE PEN NEEDLES DAILY OR AS DIRECTED.       Diabetic Supplies Protocol Failed - 4/3/2023  3:04 PM        Failed - Recent (6 mo) or future (30 days) visit within the authorizing provider's specialty     Patient had office visit in the last 6 months or has a visit in the next 30 days with authorizing provider.  See \"Patient Info\" tab in inbasket, or \"Choose Columns\" in Meds & Orders section of the refill encounter.            Passed - Medication is active on med list        Passed - Patient is 18 years of age or older           Follow-up appt scheduled 8/3/23.  "

## 2023-04-03 NOTE — TELEPHONE ENCOUNTER
"Requested Prescriptions   Pending Prescriptions Disp Refills     metFORMIN (GLUCOPHAGE) 1000 MG tablet [Pharmacy Med Name: METFORMIN HCL 1000MG TABS] 180 tablet 0     Sig: TAKE 1 TABLET (1,000 MG) BY MOUTH 2 TIMES DAILY (WITH MEALS)       Biguanide Agents Failed - 4/3/2023 10:23 AM        Failed - Recent (6 mo) or future (30 days) visit within the authorizing provider's specialty     Patient had office visit in the last 6 months or has a visit in the next 30 days with authorizing provider or within the authorizing provider's specialty.  See \"Patient Info\" tab in inbasket, or \"Choose Columns\" in Meds & Orders section of the refill encounter.            Passed - Patient is age 10 or older        Passed - Patient has documented A1c within the specified period of time.     If HgbA1C is 8 or greater, it needs to be on file within the past 3 months.  If less than 8, must be on file within the past 6 months.     Recent Labs   Lab Test 02/27/23  1638   A1C 10.0*             Passed - Patient's CR is NOT>1.4 OR Patient's EGFR is NOT<45 within past 12 mos.     Recent Labs   Lab Test 09/13/22  1516 10/26/21  1044 06/10/21  1409   GFRESTIMATED 58*   < > 55*   GFRESTBLACK  --   --  64    < > = values in this interval not displayed.       Recent Labs   Lab Test 09/13/22  1516   CR 1.34*             Passed - Patient does NOT have a diagnosis of CHF.        Passed - Medication is active on med list           Next OV 8/3/23.   "

## 2023-04-03 NOTE — TELEPHONE ENCOUNTER
Ok to send limited Rx to bridge till be next appointment.   Please pend all labs which are needed for Rx to be done before next visit.  She will need A1c as well.  Thank you.    Maryam Doshi MD    Lab Results   Component Value Date    A1C 10.0 02/27/2023    A1C 8.5 09/13/2022    A1C 7.4 10/26/2021    A1C 8.6 06/10/2021    A1C 10.4 05/19/2021    A1C 12.0 04/29/2021    A1C 13.1 04/08/2021

## 2023-04-03 NOTE — TELEPHONE ENCOUNTER
"Requested Prescriptions   Pending Prescriptions Disp Refills     pioglitazone (ACTOS) 45 MG tablet [Pharmacy Med Name: PIOGLITAZONE HCL 45MG TABS] 90 tablet 1     Sig: TAKE ONE TABLET BY MOUTH ONCE DAILY       Thiazolidinedione Agents (TZDs)  Failed - 4/1/2023 10:02 AM        Failed - Patient has a normal ALT within the past 12 mos.     Recent Labs   Lab Test 10/26/21  1044   ALT 36             Failed - Patient has a normal AST within the past 12 mos.      Recent Labs   Lab Test 10/26/21  1044   AST 15             Failed - Patient has a normal serum Creatinine in the past 12 months     Recent Labs   Lab Test 09/13/22  1516   CR 1.34*       Ok to refill medication if creatinine is low          Failed - Recent (6 mo) or future (30 days) visit within the authorizing provider's specialty     Patient had office visit in the last 6 months or has a visit in the next 30 days with authorizing provider or within the authorizing provider's specialty.  See \"Patient Info\" tab in inbasket, or \"Choose Columns\" in Meds & Orders section of the refill encounter.            Passed - Patient has documented A1c within the specified period of time.     If HgbA1C is 8 or greater, it needs to be on file within the past 3 months.  If less than 8, must be on file within the past 6 months.     Recent Labs   Lab Test 02/27/23  1638   A1C 10.0*             Passed - Diagnosis not CHF        Passed - Medication is active on med list        Passed - Patient is age 18 or older             "

## 2023-04-03 NOTE — TELEPHONE ENCOUNTER
Routing refill request to provider for review/approval because:  Recent (6 mo) or future (30 days) visit within the authorizing provider's specialty - established with provider outside [facility] on 11/2/2022     Routing to endocrinology who has other DM refills today     Lupe Sheffield, Registered Nurse  [facility]

## 2023-04-13 ENCOUNTER — TRANSFERRED RECORDS (OUTPATIENT)
Dept: HEALTH INFORMATION MANAGEMENT | Facility: CLINIC | Age: 68
End: 2023-04-13
Payer: COMMERCIAL

## 2023-05-15 ENCOUNTER — OFFICE VISIT (OUTPATIENT)
Dept: INTERNAL MEDICINE | Facility: CLINIC | Age: 68
End: 2023-05-15
Payer: COMMERCIAL

## 2023-05-15 VITALS
TEMPERATURE: 98.8 F | SYSTOLIC BLOOD PRESSURE: 132 MMHG | OXYGEN SATURATION: 95 % | DIASTOLIC BLOOD PRESSURE: 72 MMHG | BODY MASS INDEX: 38.79 KG/M2 | HEART RATE: 105 BPM | WEIGHT: 286.4 LBS | HEIGHT: 72 IN | RESPIRATION RATE: 18 BRPM

## 2023-05-15 DIAGNOSIS — Z00.00 ENCOUNTER FOR MEDICARE ANNUAL WELLNESS EXAM: ICD-10-CM

## 2023-05-15 DIAGNOSIS — Z12.11 SCREEN FOR COLON CANCER: ICD-10-CM

## 2023-05-15 DIAGNOSIS — I10 ESSENTIAL HYPERTENSION WITH GOAL BLOOD PRESSURE LESS THAN 140/90: ICD-10-CM

## 2023-05-15 DIAGNOSIS — E66.01 MORBID OBESITY (H): ICD-10-CM

## 2023-05-15 DIAGNOSIS — I77.1 STENOSIS OF LEFT SUBCLAVIAN ARTERY (H): ICD-10-CM

## 2023-05-15 DIAGNOSIS — E11.65 TYPE 2 DIABETES MELLITUS WITH HYPERGLYCEMIA, WITHOUT LONG-TERM CURRENT USE OF INSULIN (H): ICD-10-CM

## 2023-05-15 DIAGNOSIS — E78.5 HYPERLIPIDEMIA LDL GOAL <70: ICD-10-CM

## 2023-05-15 DIAGNOSIS — N18.31 STAGE 3A CHRONIC KIDNEY DISEASE (H): ICD-10-CM

## 2023-05-15 PROCEDURE — 99214 OFFICE O/P EST MOD 30 MIN: CPT | Mod: 25 | Performed by: INTERNAL MEDICINE

## 2023-05-15 PROCEDURE — G0009 ADMIN PNEUMOCOCCAL VACCINE: HCPCS | Performed by: INTERNAL MEDICINE

## 2023-05-15 PROCEDURE — 99397 PER PM REEVAL EST PAT 65+ YR: CPT | Performed by: INTERNAL MEDICINE

## 2023-05-15 PROCEDURE — 90677 PCV20 VACCINE IM: CPT | Performed by: INTERNAL MEDICINE

## 2023-05-15 ASSESSMENT — PAIN SCALES - GENERAL: PAINLEVEL: NO PAIN (0)

## 2023-05-15 ASSESSMENT — ACTIVITIES OF DAILY LIVING (ADL): CURRENT_FUNCTION: NO ASSISTANCE NEEDED

## 2023-05-15 NOTE — NURSING NOTE
Prior to immunization administration, verified patients identity using patient s name and date of birth. Please see Immunization Activity for additional information.     Screening Questionnaire for Adult Immunization    Are you sick today?   No   Do you have allergies to medications, food, a vaccine component or latex?   No   Have you ever had a serious reaction after receiving a vaccination?   No   Do you have a long-term health problem with heart, lung, kidney, or metabolic disease (e.g., diabetes), asthma, a blood disorder, no spleen, complement component deficiency, a cochlear implant, or a spinal fluid leak?  Are you on long-term aspirin therapy?   No   Do you have cancer, leukemia, HIV/AIDS, or any other immune system problem?   No   Do you have a parent, brother, or sister with an immune system problem?   No   In the past 3 months, have you taken medications that affect  your immune system, such as prednisone, other steroids, or anticancer drugs; drugs for the treatment of rheumatoid arthritis, Crohn s disease, or psoriasis; or have you had radiation treatments?   No   Have you had a seizure, or a brain or other nervous system problem?   No   During the past year, have you received a transfusion of blood or blood    products, or been given immune (gamma) globulin or antiviral drug?   No   For women: Are you pregnant or is there a chance you could become       pregnant during the next month?   No   Have you received any vaccinations in the past 4 weeks?   No     Immunization questionnaire answers were all negative.      Injection of Pneumococcal 20 given by Denia Kumar CMA. Patient instructed to remain in clinic for 15 minutes afterwards, and to report any adverse reactions.     Screening performed by Denia Kumar CMA on 5/15/2023 at 4:47 PM.

## 2023-05-15 NOTE — PROGRESS NOTES
Answers for HPI/ROS submitted by the patient on 5/15/2023  What is the reason for your visit today? : [facility]  On average, how many sweetened beverages do you drink each day (Examples: soda, juice, sweet tea, etc.  Do NOT count diet or artificially sweetened beverages)?: 0  How many minutes a day do you exercise enough to make your heart beat faster?: 10 to 19  How many days a week do you exercise enough to make your heart beat faster?: 5  How many days per week do you miss taking your medication?: 0    SUBJECTIVE:   Eric is a 67 year old who presents for Preventive Visit.      5/15/2023     3:59 PM   Additional Questions   Roomed by Maris TELLES   Accompanied by wife     Patient has been advised of split billing requirements and indicates understanding: Yes  Are you in the first 12 months of your Medicare coverage?  No    History of Present Illness       Reason for visit:  Quentin Bates consumes 0 sweetened beverage(s) daily.He exercises with enough effort to increase his heart rate 10 to 19 minutes per day.  He exercises with enough effort to increase his heart rate 5 days per week.   He is not taking prescribed medications regularly due to Not applicable.  Healthy Habits:    In general, how would you rate your overall health?  Very good    Frequency of exercise:  6-7 days/week    Duration of exercise:  30-45 minutes    Taking medications regularly:  No    Barriers to taking medications:  Not applicable    Medication side effects:  None    Ability to successfully perform activities of daily living:  No assistance needed    Home Safety:  No safety concerns identified    Hearing Impairment:  No hearing concerns    In the past 6 months, have you been bothered by leaking of urine? Yes    In general, how would you rate your overall mental or emotional health?  Very good      PHQ-2 Total Score:    Additional concerns today:  No      Have you ever done Advance Care Planning? (For example, a Health Directive, POLST,  or a discussion with a medical provider or your loved ones about your wishes): No, advance care planning information given to patient to review.  Advanced care planning was discussed at today's visit.       Fall risk  Fallen 2 or more times in the past year?: No  Any fall with injury in the past year?: No    Cognitive Screening   1) Repeat 3 items (Leader, Season, Table)    2) Clock draw: NORMAL  3) 3 item recall: Recalls 3 objects  Results: 3 items recalled: COGNITIVE IMPAIRMENT LESS LIKELY    Mini-CogTM Copyright S Martell. Licensed by the author for use in [facility]; reprinted with permission (abigail@[email or web address]). All rights reserved.      Do you have sleep apnea, excessive snoring or daytime drowsiness?: yes    Reviewed and updated as needed this visit by clinical staff   Tobacco  Allergies  Meds  Problems  Med Hx  Surg Hx  Fam Hx          Reviewed and updated as needed this visit by Provider                   Past Medical History:   Diagnosis Date     Diabetes mellitus, type 2 (H)      Hyperlipidemia      Rosacea      Stenosis of left subclavian artery (H)      Type II or unspecified type diabetes mellitus without mention of complication, not stated as uncontrolled     Abstracted 07/18/02     Unspecified essential hypertension        Past Surgical History:   Procedure Laterality Date     HERNIA REPAIR, INGUINAL RT/LT  1993    right       Current Outpatient Medications   Medication Sig Dispense Refill     alcohol swab prep pads Use to swab area of injection/jorge as directed. 100 each 3     amLODIPine (NORVASC) 10 MG tablet Take 1 tablet (10 mg) by mouth daily 90 tablet 1     aspirin 81 MG EC tablet Take 81 mg by mouth daily       B-D U/F insulin pen needle USE ONE PEN NEEDLES DAILY OR AS DIRECTED. 100 each 0     blood glucose (ACCU-CHEK SMARTVIEW) test strip Use to test blood sugar 2-3 times daily or as directed. (Patient taking differently: Use to test blood sugar 2-3 times daily or as directed.-  Through Endocrinologist) 300 strip 3     blood glucose calibration (NO BRAND SPECIFIED) solution To accompany: Blood Glucose Monitor Brands: per insurance. (Patient taking differently: To accompany: Blood Glucose Monitor Brands: per insurance. Through Endocrinologist) 1 each 0     blood glucose monitoring (NO BRAND SPECIFIED) meter device kit Use to test blood sugar 3 times daily or as directed. Preferred blood glucose meter OR supplies to accompany: Blood Glucose Monitor Brands: per insurance. (Patient taking differently: Use to test blood sugar 3 times daily or as directed. Preferred blood glucose meter OR supplies to accompany: Blood Glucose Monitor Brands: per insurance.  Through Endocrinologist) 1 kit 0     Continuous Blood Gluc Sensor (DEXCOM G6 SENSOR) MISC Change every 10 days. 9 each 1     Continuous Blood Gluc Transmit (DEXCOM G6 TRANSMITTER) MISC Change every 3 months. 1 each 1     doxycycline hyclate (VIBRAMYCIN) 100 MG capsule TAKE 1 CAPSULE BY MOUTH 2 TIMES DAILY 180 capsule 3     glipiZIDE (GLUCOTROL XL) 10 MG 24 hr tablet TAKE TWO TABLETS BY MOUTH ONCE DAILY (Patient taking differently: Through Endocrinologist) 180 tablet 0     hydrochlorothiazide (HYDRODIURIL) 25 MG tablet Take 1 tablet (25 mg) by mouth daily 90 tablet 1     JARDIANCE 25 MG TABS tablet TAKE ONE TABLET BY MOUTH ONCE DAILY (Patient taking differently: Through Endocrinologist) 90 tablet 0     liraglutide (VICTOZA PEN) 18 MG/3ML solution Inject 1.8 mg Subcutaneous daily (Patient taking differently: Inject 1.8 mg Subcutaneous daily Through Endocrinologist) 27 mL 0     metFORMIN (GLUCOPHAGE) 1000 MG tablet TAKE 1 TABLET (1,000 MG) BY MOUTH 2 TIMES DAILY (WITH MEALS) (Patient taking differently: Take 1,000 mg by mouth 2 times daily (with meals) Through Endocrinologist) 180 tablet 0     order for DME Patient Francois Fontana was set up at [place] on September 23, 2015. Patient received a [employer] DreamStation Auto CPAP Auto.  Pressures were set at Auto 6 - 12 cm H2O.   Patient s ramp is 5 cm H2O for 30 min and FLEX/EPR is A Flex.  Patient received a Alayna Respironics Mask name: Wisp  Nasal mask Size Small, Medium, Large, heated tubing and heated humidifier.  Patient is enrolled in the STM Program and does need to meet compliance. Patient has a follow up on 11/4/15 with Bennett Goltz, PA.     Myra Lim (entered by Nitin Wilson)       pioglitazone (ACTOS) 45 MG tablet TAKE ONE TABLET BY MOUTH ONCE DAILY (Patient taking differently: Through Endocrinologist) 90 tablet 1     simvastatin (ZOCOR) 20 MG tablet Take 1 tablet (20 mg) by mouth At Bedtime 90 tablet 1     thin (NO BRAND SPECIFIED) lancets Use with lanceting device. To accompany: Blood Glucose Monitor Brands: per insurance. 200 each 3     valsartan (DIOVAN) 160 MG tablet Take 1 tablet (160 mg) by mouth daily 90 tablet 1       Family History   Problem Relation Age of Onset     Cancer Father          of lung cancer     C.A.D. Father         Mult bypass operations     Diabetes Type 2  Father      Prostate Cancer Father      Respiratory Mother          of COPD     Gastrointestinal Disease Brother         Hemochromatosis     Gastrointestinal Disease Brother         Hemochromatosis     Gastrointestinal Disease Brother         Hemochromatosis ( in 3 bros)       Social History     Tobacco Use     Smoking status: Former     Types: Cigarettes     Quit date: 1985     Years since quittin.8     Smokeless tobacco: Never     Tobacco comments:     quit    Vaping Use     Vaping status: Never Used   Substance Use Topics     Alcohol use: No     Alcohol/week: 0.0 standard drinks of alcohol     Comment: quit drinking age 30         Do you have a current opioid prescription? No  Do you use any other controlled substances or medications that are not prescribed by a provider? None       Diabetes - follows up with endocrinologist and is on Victoza 1.8 mg daily, glipizide 10  mg twice daily, Jardiance 25 mg daily, Actos 45 mg and metformin 1000 mg twice daily      Hyperlipidemia Follow-Up      Are you regularly taking any medication or supplement to lower your cholesterol?   Yes- Simvastatin    Are you having muscle aches or other side effects that you think could be caused by your cholesterol lowering medication?  No    Hypertension Follow-up      Do you check your blood pressure regularly outside of the clinic? Yes     Are you following a low salt diet? Yes    Are your blood pressures ever more than 140 on the top number (systolic) OR more   than 90 on the bottom number (diastolic), for example 140/90? No    H/o Lt subclavian artery stenosis; has seen vascular specialist     Current providers sharing in care for this patient include:   Patient Care Team:  Dimas Vera PA-C as PCP - General (Family Medicine)  Coming Jagruti Montoya MD as Assigned PCP  Mica Askew OD as Assigned Surgical Provider  Maryam Doshi MD as Assigned Endocrinology Provider  Ana Maria Boyle RN as Personal Advocate & Liaison (PAL) (Family Medicine)    The following health maintenance items are reviewed in Epic and correct as of today:  Health Maintenance   Topic Date Due     ZOSTER IMMUNIZATION (1 of 2) Never done     Pneumococcal Vaccine: 65+ Years (2 - PCV) 11/02/2007     COLORECTAL CANCER SCREENING  08/19/2018     MEDICARE ANNUAL WELLNESS VISIT  07/06/2021     AORTIC ANEURYSM SCREENING (SYSTEM ASSIGNED)  Never done     ANNUAL REVIEW OF HM ORDERS  04/08/2022     DIABETIC FOOT EXAM  04/19/2022     ADVANCE CARE PLANNING  05/23/2022     MICROALBUMIN  06/28/2022     LIPID  10/26/2022     EYE EXAM  01/17/2023     COVID-19 Vaccine (6 - Pfizer series) 01/28/2023     A1C  05/27/2023     BMP  09/13/2023     PSA  02/27/2024     FALL RISK ASSESSMENT  05/15/2024     DTAP/TDAP/TD IMMUNIZATION (4 - Td or Tdap) 03/13/2029     HEPATITIS C SCREENING  Completed     PHQ-2 (once per calendar year)  Completed      INFLUENZA VACCINE  Completed     IPV IMMUNIZATION  Aged Out     MENINGITIS IMMUNIZATION  Aged Out        Review of Systems  CONSTITUTIONAL: NEGATIVE for fever, chills, change in weight  EYES: NEGATIVE for vision changes or irritation  ENT/MOUTH: NEGATIVE for ear, mouth and throat problems  RESP: NEGATIVE for significant cough or SOB  CV: NEGATIVE for chest pain, palpitations or peripheral edema  GI: NEGATIVE for nausea, abdominal pain, heartburn, or change in bowel habits  : NEGATIVE for frequency, dysuria, or hematuria  MUSCULOSKELETAL: NEGATIVE for significant arthralgias or myalgia  NEURO: NEGATIVE for weakness, dizziness or paresthesias  ENDOCRINE: NEGATIVE for temperature intolerance, skin/hair changes  HEME: NEGATIVE for bleeding problems  PSYCHIATRIC: NEGATIVE for changes in mood or affect    OBJECTIVE:   /72 (BP Location: Right arm, Cuff Size: Adult Large)   Pulse 105   Temp 98.8  F (37.1  C) (Tympanic)   Resp 18   Ht 1.829 m (6')   Wt 129.9 kg (286 lb 6.4 oz)   SpO2 95%   BMI 38.84 kg/m   Estimated body mass index is 38.84 kg/m  as calculated from the following:    Height as of this encounter: 1.829 m (6').    Weight as of this encounter: 129.9 kg (286 lb 6.4 oz).  Physical Exam  GENERAL:   alert and no distress  EYES: Eyes grossly normal to inspection, PERRL and conjunctivae and sclerae normal  HENT: ear canals and TM's normal,    RESP: lungs clear to auscultation - no rales, rhonchi or wheezes  CV: regular rate and rhythm, normal S1 S2,   ABDOMEN: soft, nontender,  and bowel sounds normal  MS: no gross musculoskeletal defects noted, no edema  NEURO: Normal strength and tone, mentation intact and speech normal  PSYCH: mentation appears normal, affect normal/bright  Diabetic foot exam normal DP and PT pulses, no trophic changes or ulcerative lesions, normal sensory exam and normal monofilament exam    ASSESSMENT / PLAN:     (Z00.00) Encounter for Medicare annual wellness exam  Plan: CBC  with platelets, Hepatic function panel            (E11.65) Type 2 diabetes mellitus with hyperglycemia, without long-term current use of insulin (H)  Plan: follows up with Endocrinologist and is on multiple medications,  Adult Eye  Referral            (N18.31) Stage 3a chronic kidney disease (H)  Plan: last creatinine 1.43, monitor creatinine , avoid nephrotoxins. May need to decrease metformin depending on creatinine levels     (I77.1) Stenosis of left subclavian artery (H)  Plan: has seen vascular Dr Gonzales consult - 2010 -recommended reconsult if any symptoms develop    (E66.01) Morbid obesity (H)  Plan:  -Discussed in detail about Diet,calorie intake,and importance of regular exercise,       (I10) Essential hypertension with goal blood pressure less than 140/90  Comment: BP stable   Plan: amLODIPine (NORVASC) 10 MG tablet, hydrochlorothiazide (HYDRODIURIL) 25 MG tablet and valsartan (DIOVAN) 160 MG tablet as directed.explained clearly about the medication,insructions and side effects. Advised to follow low salt diet and exercise and f/u in 6 mths.        (E78.5) Hyperlipidemia LDL goal <70  Plan: reviewed recent lipid panel, LDL 37 , refilled simvastatin (ZOCOR) 20 MG tablet as directed.explained clearly about the medication,insructions and side effects.             (Z12.11) Screen for colon cancer  Plan: declined colonoscopy, ordered FIT test        Patient has been advised of split billing requirements and indicates understanding: Yes      COUNSELING:  Reviewed preventive health counseling, as reflected in patient instructions       Regular exercise       Healthy diet/nutrition       Immunizations    Vaccinated for: PCV20   Discussed Shingrix vaccine , pt will check with her insurance       BMI:   Estimated body mass index is 38.84 kg/m  as calculated from the following:    Height as of this encounter: 1.829 m (6').    Weight as of this encounter: 129.9 kg (286 lb 6.4 oz).   Weight management plan:  Discussed healthy diet and exercise guidelines      He reports that he quit smoking about 37 years ago. He has never used smokeless tobacco.      Appropriate preventive services were discussed with this patient, including applicable screening as appropriate for cardiovascular disease, diabetes, osteopenia/osteoporosis, and glaucoma.  As appropriate for age/gender, discussed screening for colorectal cancer, prostate cancer, breast cancer, and cervical cancer. Checklist reviewing preventive services available has been given to the patient.    Reviewed patients plan of care and provided an AVS. The Basic Care Plan (routine screening as documented in Health Maintenance) for Francois meets the Care Plan requirement. This Care Plan has been established and reviewed with the Patient and spouse.          Delano Murray MD  [facility]    Identified Health Risks:

## 2023-05-16 PROBLEM — N18.31 STAGE 3A CHRONIC KIDNEY DISEASE (H): Status: ACTIVE | Noted: 2023-05-16

## 2023-05-16 RX ORDER — SIMVASTATIN 20 MG
20 TABLET ORAL AT BEDTIME
Qty: 90 TABLET | Refills: 1 | Status: SHIPPED | OUTPATIENT
Start: 2023-05-16 | End: 2024-01-23

## 2023-05-16 RX ORDER — VALSARTAN 160 MG/1
160 TABLET ORAL DAILY
Qty: 90 TABLET | Refills: 1 | Status: SHIPPED | OUTPATIENT
Start: 2023-05-16 | End: 2023-12-12

## 2023-05-16 RX ORDER — HYDROCHLOROTHIAZIDE 25 MG/1
25 TABLET ORAL DAILY
Qty: 90 TABLET | Refills: 1 | Status: SHIPPED | OUTPATIENT
Start: 2023-05-16 | End: 2023-12-12

## 2023-05-16 RX ORDER — AMLODIPINE BESYLATE 10 MG/1
10 TABLET ORAL DAILY
Qty: 90 TABLET | Refills: 1 | Status: SHIPPED | OUTPATIENT
Start: 2023-05-16 | End: 2024-06-24

## 2023-05-25 ENCOUNTER — LAB (OUTPATIENT)
Dept: LAB | Facility: CLINIC | Age: 68
End: 2023-05-25
Payer: COMMERCIAL

## 2023-05-25 DIAGNOSIS — Z12.11 SCREEN FOR COLON CANCER: ICD-10-CM

## 2023-05-25 DIAGNOSIS — Z00.00 ENCOUNTER FOR MEDICARE ANNUAL WELLNESS EXAM: ICD-10-CM

## 2023-05-25 DIAGNOSIS — E11.21 TYPE 2 DIABETES MELLITUS WITH DIABETIC NEPHROPATHY, WITHOUT LONG-TERM CURRENT USE OF INSULIN (H): ICD-10-CM

## 2023-05-25 LAB
ALBUMIN SERPL BCG-MCNC: 4.1 G/DL (ref 3.5–5.2)
ALP SERPL-CCNC: 64 U/L (ref 40–129)
ALT SERPL W P-5'-P-CCNC: 26 U/L (ref 10–50)
ANION GAP SERPL CALCULATED.3IONS-SCNC: 12 MMOL/L (ref 7–15)
AST SERPL W P-5'-P-CCNC: 20 U/L (ref 10–50)
BILIRUB DIRECT SERPL-MCNC: <0.2 MG/DL (ref 0–0.3)
BILIRUB SERPL-MCNC: 0.2 MG/DL
BUN SERPL-MCNC: 31.5 MG/DL (ref 8–23)
CALCIUM SERPL-MCNC: 9.1 MG/DL (ref 8.8–10.2)
CHLORIDE SERPL-SCNC: 105 MMOL/L (ref 98–107)
CHOLEST SERPL-MCNC: 122 MG/DL
CREAT SERPL-MCNC: 1.32 MG/DL (ref 0.67–1.17)
DEPRECATED HCO3 PLAS-SCNC: 22 MMOL/L (ref 22–29)
ERYTHROCYTE [DISTWIDTH] IN BLOOD BY AUTOMATED COUNT: 14.5 % (ref 10–15)
GFR SERPL CREATININE-BSD FRML MDRD: 59 ML/MIN/1.73M2
GLUCOSE SERPL-MCNC: 129 MG/DL (ref 70–99)
HBA1C MFR BLD: 10.1 % (ref 0–5.6)
HCT VFR BLD AUTO: 44.1 % (ref 40–53)
HDLC SERPL-MCNC: 38 MG/DL
HGB BLD-MCNC: 13.9 G/DL (ref 13.3–17.7)
LDLC SERPL CALC-MCNC: 43 MG/DL
MCH RBC QN AUTO: 30 PG (ref 26.5–33)
MCHC RBC AUTO-ENTMCNC: 31.5 G/DL (ref 31.5–36.5)
MCV RBC AUTO: 95 FL (ref 78–100)
NONHDLC SERPL-MCNC: 84 MG/DL
PLATELET # BLD AUTO: 329 10E3/UL (ref 150–450)
POTASSIUM SERPL-SCNC: 4.8 MMOL/L (ref 3.4–5.3)
PROT SERPL-MCNC: 6.9 G/DL (ref 6.4–8.3)
RBC # BLD AUTO: 4.64 10E6/UL (ref 4.4–5.9)
SODIUM SERPL-SCNC: 139 MMOL/L (ref 136–145)
TRIGL SERPL-MCNC: 205 MG/DL
WBC # BLD AUTO: 9.8 10E3/UL (ref 4–11)

## 2023-05-25 PROCEDURE — 82570 ASSAY OF URINE CREATININE: CPT

## 2023-05-25 PROCEDURE — 36415 COLL VENOUS BLD VENIPUNCTURE: CPT

## 2023-05-25 PROCEDURE — 80053 COMPREHEN METABOLIC PANEL: CPT | Performed by: INTERNAL MEDICINE

## 2023-05-25 PROCEDURE — 80061 LIPID PANEL: CPT

## 2023-05-25 PROCEDURE — 85027 COMPLETE CBC AUTOMATED: CPT | Performed by: INTERNAL MEDICINE

## 2023-05-25 PROCEDURE — 83036 HEMOGLOBIN GLYCOSYLATED A1C: CPT

## 2023-05-25 PROCEDURE — 82248 BILIRUBIN DIRECT: CPT | Performed by: INTERNAL MEDICINE

## 2023-05-25 PROCEDURE — 82043 UR ALBUMIN QUANTITATIVE: CPT

## 2023-05-25 NOTE — LETTER
May 30, 2023      Eric Fontana  [address] [place] [address]        Dear ,    We are writing to inform you of your test results.    I have reviewed  CBC and liver panel  which are normal or stable.    Resulted Orders   Hemoglobin A1c   Result Value Ref Range    Hemoglobin A1C 10.1 (H) 0.0 - 5.6 %      Comment:      Normal <5.7%   Prediabetes 5.7-6.4%    Diabetes 6.5% or higher     Note: Adopted from ADA consensus guidelines.    Narrative    Reviewed, ok with previous.     Basic metabolic panel  (Ca, Cl, CO2, Creat, Gluc, K, Na, BUN)   Result Value Ref Range    Sodium 139 136 - 145 mmol/L    Potassium 4.8 3.4 - 5.3 mmol/L    Chloride 105 98 - 107 mmol/L    Carbon Dioxide (CO2) 22 22 - 29 mmol/L    Anion Gap 12 7 - 15 mmol/L    Urea Nitrogen 31.5 (H) 8.0 - 23.0 mg/dL    Creatinine 1.32 (H) 0.67 - 1.17 mg/dL    Calcium 9.1 8.8 - 10.2 mg/dL    Glucose 129 (H) 70 - 99 mg/dL    GFR Estimate 59 (L) >60 mL/min/1.73m2      Comment:      eGFR calculated using 2021 CKD-EPI equation.   Albumin Random Urine Quantitative with Creat Ratio   Result Value Ref Range    Creatinine Urine mg/dL 65.1 mg/dL      Comment:      The reference ranges have not been established in urine creatinine. The results should be integrated into the clinical context for interpretation.    Albumin Urine mg/L 74.7 mg/L      Comment:      The reference ranges have not been established in urine albumin. The results should be integrated into the clinical context for interpretation.    Albumin Urine mg/g Cr 114.75 (H) 0.00 - 17.00 mg/g Cr      Comment:      Microalbuminuria is defined as an albumin:creatinine ratio of 17 to 299 for males and 25 to 299 for females. A ratio of albumin:creatinine of 300 or higher is indicative of overt proteinuria.  Due to biologic variability, positive results should be confirmed by a second, first-morning random or 24-hour timed urine specimen. If there is discrepancy, a third specimen is recommended.  When 2 out of 3 results are in the microalbuminuria range, this is evidence for incipient nephropathy and warrants increased efforts at glucose control, blood pressure control, and institution of therapy with an angiotensin-converting-enzyme (ACE) inhibitor (if the patient can tolerate it).     Lipid panel reflex to direct LDL Fasting   Result Value Ref Range    Cholesterol 122 <200 mg/dL    Triglycerides 205 (H) <150 mg/dL    Direct Measure HDL 38 (L) >=40 mg/dL    LDL Cholesterol Calculated 43 <=100 mg/dL    Non HDL Cholesterol 84 <130 mg/dL    Narrative    Cholesterol  Desirable:  <200 mg/dL    Triglycerides  Normal:  Less than 150 mg/dL  Borderline High:  150-199 mg/dL  High:  200-499 mg/dL  Very High:  Greater than or equal to 500 mg/dL    Direct Measure HDL  Female:  Greater than or equal to 50 mg/dL   Male:  Greater than or equal to 40 mg/dL    LDL Cholesterol  Desirable:  <100mg/dL  Above Desirable:  100-129 mg/dL   Borderline High:  130-159 mg/dL   High:  160-189 mg/dL   Very High:  >= 190 mg/dL    Non HDL Cholesterol  Desirable:  130 mg/dL  Above Desirable:  130-159 mg/dL  Borderline High:  160-189 mg/dL  High:  190-219 mg/dL  Very High:  Greater than or equal to 220 mg/dL   CBC with platelets   Result Value Ref Range    WBC Count 9.8 4.0 - 11.0 10e3/uL    RBC Count 4.64 4.40 - 5.90 10e6/uL    Hemoglobin 13.9 13.3 - 17.7 g/dL    Hematocrit 44.1 40.0 - 53.0 %    MCV 95 78 - 100 fL    MCH 30.0 26.5 - 33.0 pg    MCHC 31.5 31.5 - 36.5 g/dL    RDW 14.5 10.0 - 15.0 %    Platelet Count 329 150 - 450 10e3/uL   Hepatic function panel   Result Value Ref Range    Protein Total 6.9 6.4 - 8.3 g/dL    Albumin 4.1 3.5 - 5.2 g/dL    Bilirubin Total 0.2 <=1.2 mg/dL    Alkaline Phosphatase 64 40 - 129 U/L    AST 20 10 - 50 U/L    ALT 26 10 - 50 U/L    Bilirubin Direct <0.20 0.00 - 0.30 mg/dL       If you have any questions or concerns, please call the clinic at the number listed above.       Sincerely,      Delano KELLOGG  MD Terri

## 2023-05-25 NOTE — LETTER
May 30, 2023      Eric Fontana  [address] [address]        Dear ,    We are writing to inform you of your test results.    Labs/ Imaging studies done with endocrinology are attached.     Follow-up in endocrinology Clinic as scheduled 8/3/23.        Resulted Orders   Hemoglobin A1c   Result Value Ref Range    Hemoglobin A1C 10.1 (H) 0.0 - 5.6 %      Comment:      Normal <5.7%   Prediabetes 5.7-6.4%    Diabetes 6.5% or higher     Note: Adopted from ADA consensus guidelines.    Narrative    Reviewed, ok with previous.     Basic metabolic panel  (Ca, Cl, CO2, Creat, Gluc, K, Na, BUN)   Result Value Ref Range    Sodium 139 136 - 145 mmol/L    Potassium 4.8 3.4 - 5.3 mmol/L    Chloride 105 98 - 107 mmol/L    Carbon Dioxide (CO2) 22 22 - 29 mmol/L    Anion Gap 12 7 - 15 mmol/L    Urea Nitrogen 31.5 (H) 8.0 - 23.0 mg/dL    Creatinine 1.32 (H) 0.67 - 1.17 mg/dL    Calcium 9.1 8.8 - 10.2 mg/dL    Glucose 129 (H) 70 - 99 mg/dL    GFR Estimate 59 (L) >60 mL/min/1.73m2      Comment:      eGFR calculated using 2021 CKD-EPI equation.   Albumin Random Urine Quantitative with Creat Ratio   Result Value Ref Range    Creatinine Urine mg/dL 65.1 mg/dL      Comment:      The reference ranges have not been established in urine creatinine. The results should be integrated into the clinical context for interpretation.    Albumin Urine mg/L 74.7 mg/L      Comment:      The reference ranges have not been established in urine albumin. The results should be integrated into the clinical context for interpretation.    Albumin Urine mg/g Cr 114.75 (H) 0.00 - 17.00 mg/g Cr      Comment:      Microalbuminuria is defined as an albumin:creatinine ratio of 17 to 299 for males and 25 to 299 for females. A ratio of albumin:creatinine of 300 or higher is indicative of overt proteinuria.  Due to biologic variability, positive results should be confirmed by a second, first-morning random or 24-hour timed urine specimen.  If there is discrepancy, a third specimen is recommended. When 2 out of 3 results are in the microalbuminuria range, this is evidence for incipient nephropathy and warrants increased efforts at glucose control, blood pressure control, and institution of therapy with an angiotensin-converting-enzyme (ACE) inhibitor (if the patient can tolerate it).     Lipid panel reflex to direct LDL Fasting   Result Value Ref Range    Cholesterol 122 <200 mg/dL    Triglycerides 205 (H) <150 mg/dL    Direct Measure HDL 38 (L) >=40 mg/dL    LDL Cholesterol Calculated 43 <=100 mg/dL    Non HDL Cholesterol 84 <130 mg/dL    Narrative    Cholesterol  Desirable:  <200 mg/dL    Triglycerides  Normal:  Less than 150 mg/dL  Borderline High:  150-199 mg/dL  High:  200-499 mg/dL  Very High:  Greater than or equal to 500 mg/dL    Direct Measure HDL  Female:  Greater than or equal to 50 mg/dL   Male:  Greater than or equal to 40 mg/dL    LDL Cholesterol  Desirable:  <100mg/dL  Above Desirable:  100-129 mg/dL   Borderline High:  130-159 mg/dL   High:  160-189 mg/dL   Very High:  >= 190 mg/dL    Non HDL Cholesterol  Desirable:  130 mg/dL  Above Desirable:  130-159 mg/dL  Borderline High:  160-189 mg/dL  High:  190-219 mg/dL  Very High:  Greater than or equal to 220 mg/dL       If you have any questions or concerns, please call the clinic at the number listed above.       Sincerely,      Maryam Doshi MD

## 2023-05-26 LAB
CREAT UR-MCNC: 65.1 MG/DL
MICROALBUMIN UR-MCNC: 74.7 MG/L
MICROALBUMIN/CREAT UR: 114.75 MG/G CR (ref 0–17)

## 2023-05-30 NOTE — RESULT ENCOUNTER NOTE
Francois    Labs/ Imaging studies done with endocrinology are attached.    Follow-up in endocrinology Clinic as scheduled 8/3/23.      Please call endocrinology clinic if questions.    Please send a letter with above lab/test results and above comments.    Thank you.    Maryam Doshi MD

## 2023-06-05 DIAGNOSIS — E11.21 TYPE 2 DIABETES MELLITUS WITH DIABETIC NEPHROPATHY, WITHOUT LONG-TERM CURRENT USE OF INSULIN (H): ICD-10-CM

## 2023-06-05 DIAGNOSIS — E11.65 TYPE 2 DIABETES MELLITUS WITH HYPERGLYCEMIA, WITHOUT LONG-TERM CURRENT USE OF INSULIN (H): ICD-10-CM

## 2023-06-05 RX ORDER — LIRAGLUTIDE 6 MG/ML
INJECTION SUBCUTANEOUS
Qty: 18 ML | Refills: 0 | Status: SHIPPED | OUTPATIENT
Start: 2023-06-05 | End: 2023-08-03

## 2023-06-05 RX ORDER — GLIPIZIDE 10 MG/1
TABLET, FILM COATED, EXTENDED RELEASE ORAL
Qty: 120 TABLET | Refills: 0 | Status: SHIPPED | OUTPATIENT
Start: 2023-06-05 | End: 2023-08-03

## 2023-06-08 ENCOUNTER — TRANSFERRED RECORDS (OUTPATIENT)
Dept: HEALTH INFORMATION MANAGEMENT | Facility: CLINIC | Age: 68
End: 2023-06-08
Payer: COMMERCIAL

## 2023-06-26 ENCOUNTER — TELEPHONE (OUTPATIENT)
Dept: ENDOCRINOLOGY | Facility: CLINIC | Age: 68
End: 2023-06-26
Payer: COMMERCIAL

## 2023-06-26 DIAGNOSIS — E11.21 TYPE 2 DIABETES MELLITUS WITH DIABETIC NEPHROPATHY, WITHOUT LONG-TERM CURRENT USE OF INSULIN (H): ICD-10-CM

## 2023-06-27 NOTE — TELEPHONE ENCOUNTER
"Requested Prescriptions   Pending Prescriptions Disp Refills     JARDIANCE 25 MG TABS tablet [Pharmacy Med Name: JARDIANCE 25MG TABS] 90 tablet 0     Sig: TAKE ONE TABLET BY MOUTH ONCE DAILY       Sodium Glucose Co-Transport Inhibitor Agents Failed - 6/26/2023  5:04 PM        Failed - Recent (6 mo) or future (30 days) visit within the authorizing provider's specialty     Patient had office visit in the last 6 months or has a visit in the next 30 days with authorizing provider or within the authorizing provider's specialty.  See \"Patient Info\" tab in inbasket, or \"Choose Columns\" in Meds & Orders section of the refill encounter.            Passed - Patient has documented A1c within the specified period of time.     If HgbA1C is 8 or greater, it needs to be on file within the past 3 months.  If less than 8, must be on file within the past 6 months.     Recent Labs   Lab Test 05/25/23  0932   A1C 10.1*             Passed - No creatinine >1.4 or GFR <45 within the past 12 mos     Recent Labs   Lab Test 05/25/23  0932 10/26/21  1044 06/10/21  1409   GFRESTIMATED 59*   < > 55*   GFRESTBLACK  --   --  64    < > = values in this interval not displayed.       Recent Labs   Lab Test 05/25/23 0932   CR 1.32*             Passed - Medication is active on med list        Passed - Patient is age 18 or older        Passed - Patient has documented normal Potassium within the last 12 mos.     Recent Labs   Lab Test 05/25/23  0932   POTASSIUM 4.8                  "

## 2023-06-28 NOTE — TELEPHONE ENCOUNTER
Okay to send limited prescription.  Please place labs for A1c to be done few days prior to next appointment.  He needs close follow-up.  Last visit was more than 1 year back.    Lab Results   Component Value Date    A1C 10.1 05/25/2023    A1C 10.0 02/27/2023    A1C 8.5 09/13/2022    A1C 7.4 10/26/2021    A1C 8.6 06/10/2021    A1C 10.4 05/19/2021    A1C 12.0 04/29/2021    A1C 13.1 04/08/2021     Creatinine   Date Value Ref Range Status   05/25/2023 1.32 (H) 0.67 - 1.17 mg/dL Final   09/13/2022 1.34 (A) 0.60 - 1.30 mg/dL Final     Comment:     ran twice

## 2023-06-29 RX ORDER — EMPAGLIFLOZIN 25 MG/1
TABLET, FILM COATED ORAL
Qty: 60 TABLET | Refills: 0 | Status: SHIPPED | OUTPATIENT
Start: 2023-06-29 | End: 2023-08-03

## 2023-06-29 NOTE — TELEPHONE ENCOUNTER
Hello,    There are patient assistance programs for both medications. Below is the eligibility requirements.    If there's any questions feel free to contact me    Thank You!    Neno Valverde [facility] Pharmacy Liaison  [facility] [email or web address]  Phone: [phone]  Fax: [phone]      For Victoza, there is a Patient Assistance Program through [employer]. This is there eligibility:        For Jardiance there is [employer]:

## 2023-06-29 NOTE — TELEPHONE ENCOUNTER
I called the pt, he has a 2 member household, which makes <30,000/ yr. Please contact the pt to assist with pt assistance.

## 2023-06-29 NOTE — TELEPHONE ENCOUNTER
I have the both the patient and provider section ready. Where can I send the provider section(email preferred for clarity)? I will contact the patient soon.    Thank You!    Neno Valverde [facility] Pharmacy Liaison  [employer] [email or web address]  Phone: [phone]  Fax: [phone]

## 2023-06-29 NOTE — TELEPHONE ENCOUNTER
Spoke with patient, he said that he only gets labs done 2x and he's already had them both, he said he is also on Medicare and Victoza/Jardiance is expensive, may have to go on something else. Pt would like to speak with nurse.

## 2023-07-05 DIAGNOSIS — I10 ESSENTIAL HYPERTENSION WITH GOAL BLOOD PRESSURE LESS THAN 140/90: ICD-10-CM

## 2023-07-05 DIAGNOSIS — E78.5 HYPERLIPIDEMIA LDL GOAL <70: ICD-10-CM

## 2023-07-05 RX ORDER — FLURBIPROFEN SODIUM 0.3 MG/ML
SOLUTION/ DROPS OPHTHALMIC
Qty: 100 EACH | Refills: 0 | Status: SHIPPED | OUTPATIENT
Start: 2023-07-05 | End: 2023-08-03

## 2023-07-05 NOTE — TELEPHONE ENCOUNTER
"Requested Prescriptions   Pending Prescriptions Disp Refills     B-D U/F insulin pen needle [Pharmacy Med Name: BD PEN NEEDLE MINI  31G X 5 MM MISC]  0     Sig: USE ONE PEN NEEDLES DAILY OR AS DIRECTED.       Diabetic Supplies Protocol Passed - 7/5/2023  3:35 PM        Passed - Medication is active on med list        Passed - Patient is 18 years of age or older        Passed - Recent (6 mo) or future (30 days) visit within the authorizing provider's specialty     Patient had office visit in the last 6 months or has a visit in the next 30 days with authorizing provider.  See \"Patient Info\" tab in inbasket, or \"Choose Columns\" in Meds & Orders section of the refill encounter.                 "

## 2023-07-10 NOTE — TELEPHONE ENCOUNTER
Hello,    Both applications has been emailed.    Thank You!    Neno Valverde CP Pharmacy Liaison  [facility]  [email or web address]  Phone: [phone]  Fax: [phone]

## 2023-07-10 NOTE — TELEPHONE ENCOUNTER
Victoza(Paulette Nordisk) and Jardiance(Boehringer) Patient Assistance forms    LAST OFFICE/VIRTUAL VISIT:  04/06/22    FUTURE OFFICE/VIRTUAL VISIT:  08/03/23    Lab Results   Component Value Date    A1C 10.1 05/25/2023    A1C 10.0 02/27/2023    A1C 8.5 09/13/2022    A1C 7.4 10/26/2021    A1C 8.6 06/10/2021    A1C 10.4 05/19/2021    A1C 12.0 04/29/2021    A1C 13.1 04/08/2021           Kimberlee Poole [facility] Endocrinology [place]  [phone]

## 2023-07-12 NOTE — TELEPHONE ENCOUNTER
Attached the patient's sections of the applications to patient's media tab.    Thank You!    Neno Valverde [facility] Pharmacy Liaison  [facility]  [email or web address]  Phone: [phone]  Fax: [phone]

## 2023-07-12 NOTE — TELEPHONE ENCOUNTER
Forms/paperwork reviewed, completed and signed.  Please fax or send the papers as requested, document in chart and close the encounter.    Thank you.    Maryam Doshi MD

## 2023-07-12 NOTE — TELEPHONE ENCOUNTER
Form was faxed to [employer] and sent to scanning.      Kimberlee Poole, HARLAN  [facility]  [place]/[facility]it

## 2023-07-12 NOTE — TELEPHONE ENCOUNTER
That sounds great. Should I send you the patient's section of the applications as well?    Thank You!    Neno Valverde [facility] Pharmacy Liaison  [facility] [email or web address]  Phone: [phone]  Fax: [phone]

## 2023-07-12 NOTE — TELEPHONE ENCOUNTER
Hello,    I tried contacting the patient via phone call however it goes straight to voice message. Will the patient be coming in for a visit anytime soon? If so, I can attach the patient's application to the media tab or email you the patient's sections of the applications as well.    Thank You!    Neno Valverde [facility] Pharmacy Liaison  [employer] [facility] [email or web address]  Phone: [phone]  Fax: [phone]

## 2023-08-03 ENCOUNTER — OFFICE VISIT (OUTPATIENT)
Dept: ENDOCRINOLOGY | Facility: CLINIC | Age: 68
End: 2023-08-03
Payer: COMMERCIAL

## 2023-08-03 VITALS
SYSTOLIC BLOOD PRESSURE: 154 MMHG | HEIGHT: 72 IN | WEIGHT: 288.7 LBS | OXYGEN SATURATION: 96 % | HEART RATE: 93 BPM | TEMPERATURE: 97.8 F | RESPIRATION RATE: 18 BRPM | BODY MASS INDEX: 39.1 KG/M2 | DIASTOLIC BLOOD PRESSURE: 74 MMHG

## 2023-08-03 DIAGNOSIS — I10 ESSENTIAL HYPERTENSION WITH GOAL BLOOD PRESSURE LESS THAN 140/90: ICD-10-CM

## 2023-08-03 DIAGNOSIS — E11.21 TYPE 2 DIABETES MELLITUS WITH DIABETIC NEPHROPATHY, WITHOUT LONG-TERM CURRENT USE OF INSULIN (H): ICD-10-CM

## 2023-08-03 DIAGNOSIS — E11.65 TYPE 2 DIABETES MELLITUS WITH HYPERGLYCEMIA, WITHOUT LONG-TERM CURRENT USE OF INSULIN (H): ICD-10-CM

## 2023-08-03 DIAGNOSIS — E78.5 HYPERLIPIDEMIA LDL GOAL <70: ICD-10-CM

## 2023-08-03 PROCEDURE — 99214 OFFICE O/P EST MOD 30 MIN: CPT | Performed by: INTERNAL MEDICINE

## 2023-08-03 RX ORDER — FLURBIPROFEN SODIUM 0.3 MG/ML
SOLUTION/ DROPS OPHTHALMIC
Qty: 100 EACH | Refills: 0 | Status: SHIPPED | OUTPATIENT
Start: 2023-08-03 | End: 2024-01-05

## 2023-08-03 RX ORDER — PIOGLITAZONEHYDROCHLORIDE 45 MG/1
45 TABLET ORAL DAILY
Qty: 90 TABLET | Refills: 1 | Status: SHIPPED | OUTPATIENT
Start: 2023-08-03 | End: 2024-01-22

## 2023-08-03 RX ORDER — LIRAGLUTIDE 6 MG/ML
1.8 INJECTION SUBCUTANEOUS DAILY
Qty: 18 ML | Refills: 0 | Status: CANCELLED | OUTPATIENT
Start: 2023-08-03

## 2023-08-03 RX ORDER — GLIPIZIDE 10 MG/1
20 TABLET, FILM COATED, EXTENDED RELEASE ORAL DAILY
Qty: 180 TABLET | Refills: 1 | Status: SHIPPED | OUTPATIENT
Start: 2023-08-03 | End: 2024-01-22

## 2023-08-03 NOTE — LETTER
8/3/2023         RE: Francois Fontana  [address] [facility] [address]        Dear Colleague,    Thank you for referring your patient, Francois Fontana, to the [facility]. Please see a copy of my visit note below.    ENDOCRINOLOGY CLINIC NOTE:  Name: Francois Fontana  Seen for f/u of  Diabetes.  HPI:  Francois Fontana is a 68 year old male who presents for the evaluation/management of Diabetes.   has a past medical history of Diabetes mellitus, type 2 (H), Hyperlipidemia, Rosacea, Stenosis of left subclavian artery (H), Type II or unspecified type diabetes mellitus without mention of complication, not stated as uncontrolled, and Unspecified essential hypertension.   Was seen by  and Sherri Mayer before.    He is not checking BG.  He is not going to gym.  He had shoulder surgery for rotator cuff injury. It is now better.  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    He is resistance to take insulin.    A1c increased.    Wt Readings from Last 2 Encounters:   08/03/23 131 kg (288 lb 11.2 oz)   05/15/23 129.9 kg (286 lb 6.4 oz)       1. Type 2 DM:  Orginally diagnosed at the age of: Original diagnosis in 1990.   Current Regimen:   Metformin 1000 mg twice a day  Jardiance 25 mg once a day  Glipizide XL 10 mg twice a day  Actos 45 mg once a day  Victoza 1.8 mg once a day    yes:     Diabetes Medication(s)       Biguanides       metFORMIN (GLUCOPHAGE) 1000 MG tablet    TAKE 1 TABLET (1,000 MG) BY MOUTH 2 TIMES DAILY (WITH MEALS)     Patient taking differently: Take 1,000 mg by mouth 2 times daily (with meals) Through Endocrinologist      Sodium-Glucose Co-Transporter 2 (SGLT2) Inhibitors       JARDIANCE 25 MG TABS tablet    TAKE ONE TABLET BY MOUTH ONCE DAILY      Sulfonylureas       glipiZIDE (GLUCOTROL XL) 10 MG 24 hr tablet    TAKE TWO TABLETS BY MOUTH ONCE DAILY      Insulin Sensitizing Agents       pioglitazone (ACTOS) 45 MG tablet    TAKE ONE TABLET  BY MOUTH ONCE DAILY     Patient taking differently: Through Endocrinologist      Incretin Mimetic Agents       VICTOZA PEN 18 MG/3ML soln    INJECT 1.8 MG UNDER THE SKIN DAILY            BS checks: not checking BG- it hurts  Average Meter Download: Patient is not checking BG. Without Blood sugar data it is difficult to make adjustment to medical regimen.     Exercise: Limited  Last A1c: 10.1%  Symptoms of hypoglycemia (low blood sugar):  Gets symptoms of hypoglycemia.  Episodes of hypoglycemia: Rare    DM Complications:   Complications:   Diabetes Complications  Description / Detail    Diabetic Retinopathy  No   CAD / PAD  No   Neuropathy  No   Nephropathy / Microalbuminuria     + microalbuminuria--on valsartan        Gastroparesis  No   Hypoglycemia [place]awarness  No       2. Hypertension:    on medications  3. Hyperlipidemia: on medications    PMH/PSH:  Past Medical History:   Diagnosis Date     Diabetes mellitus, type 2 (H)      Hyperlipidemia      Rosacea      Stenosis of left subclavian artery (H)      Type II or unspecified type diabetes mellitus without mention of complication, not stated as uncontrolled     Abstracted 02     Unspecified essential hypertension      Past Surgical History:   Procedure Laterality Date     HERNIA REPAIR, INGUINAL RT/LT      right     Family Hx:  Family History   Problem Relation Age of Onset     Cancer Father          of lung cancer     C.A.D. Father         Mult bypass operations     Diabetes Type 2  Father      Prostate Cancer Father      Respiratory Mother          of COPD     Gastrointestinal Disease Brother         Hemochromatosis     Gastrointestinal Disease Brother         Hemochromatosis     Gastrointestinal Disease Brother         Hemochromatosis ( in 3 bros)       Diabetes: Father    Social Hx:  Social History     Socioeconomic History     Marital status:      Spouse name: Not on file     Number of children: Not on file     Years of education: Not  "on file     Highest education level: Not on file   Occupational History     Not on file   Tobacco Use     Smoking status: Former     Types: Cigarettes     Quit date: 1985     Years since quittin.0     Smokeless tobacco: Never     Tobacco comments:     quit    Vaping Use     Vaping Use: Never used   Substance and Sexual Activity     Alcohol use: No     Alcohol/week: 0.0 standard drinks of alcohol     Comment: quit drinking age 30     Drug use: No     Sexual activity: Yes     Partners: Female   Other Topics Concern     Parent/sibling w/ CABG, MI or angioplasty before 65F 55M? Yes   Social History Narrative     Not on file     Social Determinants of Health     Financial Resource Strain: Not on file   Food Insecurity: Not on file   Transportation Needs: Not on file   Physical Activity: Not on file   Stress: Not on file   Social Connections: Not on file   Intimate Partner Violence: Not on file   Housing Stability: Not on file          MEDICATIONS:  has a current medication list which includes the following prescription(s): alcohol swab, amlodipine, aspirin, b-d u/f, accu-chek smartview, blood glucose calibration, blood glucose monitoring, doxycycline hyclate, glipizide, hydrochlorothiazide, jardiance, metformin, order for dme, pioglitazone, simvastatin, thin, valsartan, and victoza pen.    ROS     ROS: 10 point ROS neg other than the symptoms noted above in the HPI.    Physical Exam   VS: BP (!) 154/74 (BP Location: Left arm, Patient Position: Chair, Cuff Size: Adult Large)   Pulse 93   Temp 97.8  F (36.6  C) (Tympanic)   Resp 18   Ht 1.829 m (6' 0.01\")   Wt 131 kg (288 lb 11.2 oz)   SpO2 96%   BMI 39.15 kg/m    GENERAL: healthy, alert and no distress  EYES: Eyes grossly normal to inspection, conjunctivae and sclerae normal  ENT: no nose swelling, nasal discharge.  Thyroid: no apparent thyroid nodules.  Thyroid appears normal in size and nontender.  CV: RRR, no rubs, gallops, no murmurs  RESP: CTAB, " no wheezes, rales, or ronchi  ABDO: +BS  EXTREMITIES: no hand tremors.  NEURO: Cranial nerves grossly intact, mentation intact and speech normal  SKIN: No apparent skin lesions, rash or edema seen   PSYCH: mentation appears normal, affect normal/bright, judgement and insight intact, normal speech and appearance well-groomed      LABS:  A1c:  Lab Results   Component Value Date    A1C 10.1 05/25/2023    A1C 10.0 02/27/2023    A1C 8.5 09/13/2022    A1C 7.4 10/26/2021    A1C 8.6 06/10/2021    A1C 10.4 05/19/2021    A1C 12.0 04/29/2021    A1C 13.1 04/08/2021     Creatinine:  Creatinine   Date Value Ref Range Status   05/25/2023 1.32 (H) 0.67 - 1.17 mg/dL Final   09/13/2022 1.34 (A) 0.60 - 1.30 mg/dL Final     Comment:     ran twice        Urine Micro:  Lab Results   Component Value Date    UMALCR 114.75 05/25/2023    UMALCR 68.50 06/28/2021       LFTs/Lipids:  Recent Labs   Lab Test 05/25/23  0932 10/26/21  1044 09/15/16  1043 11/17/15  1006   CHOL 122 102   < > 149   HDL 38* 43   < > 40*   LDL 43 32   < > 70   TRIG 205* 137   < > 196*   CHOLHDLRATIO  --   --   --  3.7    < > = values in this interval not displayed.       All pertinent notes, labs, and images personally reviewed by me.     Glucometer/ insulin pump (if applicable)/ CGM data (if applicable) downloaded, Personally reviewed and interpreted.  All Blood sugar data reviewed personally and discussed with pt.      A/P  Mr.Raymond COLETTE Fontana is a 66 year old here for the evaluation/management of diabetes:    1. DM2 - Under Fair control.  A1c 10.1%.  Diabetes complicated by microalbuminuria.  Plan:  Discussed diagnosis, pathophysiology, management and treatment options of condition with pt.  I also discussed importance of strict blood sugar control to prevent complications associated with uncontrolled diabetes.  I discussed with patient that A1c is high and need to consider insulin but he is resistant to that idea.  He is working as a  and would like to avoid  insulin.  Discussed switching to Ozempic or Mounjaro for added benefit of weight loss and he is agreeable to that.  Continue metformin 1000 mg twice a day ,Jardiance 25 mg once a day, Glipizide XL 10 mg twice a day and Actos 45 mg once a day  Stop Victoza.  Start Ozempic at 0.5 mg/week (as he is tolerating it was okay recommend to start Ozempic at 0.5 mg/week)  See Ana Maria Tovar PA-C in 4 weeks along with labs for blood sugar review and Ozempic titration  Freestyle laurel sample given.  He will check with his insurance if it is covered by insurance and will inform us if he needs prescription  Repeat labs and follow-up in 4-5 months.    2. Hypertension - Under Good control. On hydrochlorothiazide 25 mg, amlodipine 10 mg and valsartan 160 mg.  + microabuminuria: on Valsartan.    3. Hyperlipidemia - Under Good control. On simvastatin 20 mg. LDL 43.    4. Prevention:  Opthalmology- no  Recommend annually.  New referral in place  ASA- 81 mg/day  Smoking- former smoker    Foot Exam: Yes: 05/15/23     Most Recent Immunizations   Administered Date(s) Administered     COVID-19 Bivalent 12+ (Pfizer) 09/28/2022     COVID-19 MONOVALENT 12+ (Pfizer) 10/30/2021     COVID-19 Monovalent 12+ (Pfizer 2022) 05/06/2022     Flu, Unspecified 10/05/2020     Influenza (IIV3) PF 08/23/2014     Influenza (intradermal) 10/29/2018     Influenza Vaccine 50-64 or 18-64 w/egg allergy (Flublok) 10/05/2020     Influenza Vaccine 65+ (Fluzone HD) 09/28/2022     Influenza Vaccine >6 months (Alfuria,Fluzone) 09/15/2016     Pneumococcal 20 valent Conjugate (Prevnar 20) 05/15/2023     Pneumococcal 23 valent 11/02/2006     TDAP (Adacel,Boostrix) 10/20/2008     TDAP Vaccine (Adacel) 10/20/2008     Td (Adult), Adsorbed 03/13/2019         Recommend checking blood sugars before meals and at bedtime.    If Blood glucose are low more often-> 2-3 times/week- give us a call.  The patient is advised to Make better food choices: reduce carbs, Reduce portion size,  weight loss and exercise 3-4 times a week.  Discussed hypoglycemia signs and symptoms as well as management in detail.    There is some variability among people, most will usually develop symptoms suggestive of hypoglycemia when blood glucose levels are lowered to the mid 60's. The first set of symptoms are called adrenergic. Patients may experience any of the following nervousness, sweating, intense hunger, trembling, weakness, palpitations, and difficulty speaking.   The acute management of hypoglycemia involves the rapid delivery of a source of easily absorbed sugar. Regular soda, juice, lifesavers, table sugar, are good options. 15 grams of glucose is the dose that is given, followed by an assessment of symptoms and a blood glucose check if possible. If after 10 minutes there is no improvement, another 10-15 grams should be given. This can be repeated up to three times. The equivalency of 10-15 grams of glucose (approximate servings) are: Four lifesavers, 4 teaspoons of sugar, or 1/2 can of regular soda or juice.     All questions were answered.  The patient indicates understanding of the above issues and agrees with the plan set forth.     Follow-up:  4-5 months.    Maryam Doshi M.D  Endocrinology  [facility]  CC: Danna Montoya, April Angeles    Disclaimer: This note consists of symbols derived from keyboarding, dictation and/or voice recognition software. As a result, there may be errors in the script that have gone undetected. Please consider this when interpreting information found in this chart.    Addendum to above note and clinic visit:    Labs reviewed.    See result note/telephone encounter.        Again, thank you for allowing me to participate in the care of your patient.        Sincerely,        Maryam Doshi MD

## 2023-08-03 NOTE — PATIENT INSTRUCTIONS
[facility]  Dr Doshi, Endocrinology Department    [facility]   [address] [id number]  [place] [address]  Appointment [phone]  Fax: [phone]  [place]: Monday - Thursday      Please check the cost coverage and copay with insurance before recommended tests, services and medications (especially if new medications are prescribed).     If ordered, please get blood work done 1 week prior to your next appointment so they will be available to Dr. Doshi at your visit.    To provide the best diabetic care, please bring your blood glucose meter to each and every visit with your  Endocrinologist. Your blood glucose CGM/meter/insulin pump will be downloaded at every appointment.  Please arrive 15 minutes before your scheduled appointment. This will allow for your blood glucose CGM/meter/insulin pump  to be downloaded.  If you are wearing DEXCOM please bring  or sharing code from the Dexcom Clarity Katie so that it can be downloaded.  If you are using Pager personal sensors please bring the reader.    Continue metformin. Jradiance, Glipizide and Actos at current dose.  Stop Victoza.  Start Ozempic gradually.    Start Ozempic. It is once a week.  Start at 0.5 mg/ week.  After 4 weeks -- follow up with Ana Maria Tovar in 4 week and then consider to increase dose to 1.0 mg.  Labs before that.      Ozempic-- Possible side effect profile of these class of medication includes: Nausea, diarrhea, gastrointestinal intolerance, abdominal pain, upper respiratory tract infection, headache, increased heart rate, drop in blood sugar, injection site reaction.  Rare side effects include pancreatitis, kidney problems.  It has been associated with thyroid cancer in animal models.-    Recommend checking blood sugars before meals and at bedtime.    If Blood glucose are low more often-> 2-3 times/week- give us a call.  Make better food choices: reduce carbs, Reduce  portion size, weight loss and exercise 3-4 times a week.    What is hypoglycemia:  Hypoglycemia is when blood sugar levels become too low - below 70 m/dl.      What causes hypoglycemia?  - using too much insulin  -taking too many diabetes pills  -not eating enough, or skipping meals or snacks  -not eating enough carbohydrate with meals  -changing your exercise routine  -drinking alcohol in excess    It is also possible to have hypoglycemia even when you are carefully managing your blood sugar levels.    What does it feel like when blood sugars get too low?  You may feel:  - anxious  -confused  -dizzy  -hungry  -light-headed  -nervous  -shaky  -sleepy  -sweaty    You may have  -blurred or cloudy vision  -heart palpitations (heart skips a beat or races)  -tingling or numbness around the mouth and tongue  -tremors    What to do if you have symptoms of hypoglycmemia:  If you think your blood sugar is too low, check it with a glucose meter.  If its below 70 mg/dl, consume one of the following:  Fruit juice (1/2 cup)  Glucose tablets (15 grams)  Hard candy (5 to 7 pieces)  Honey or sugar (2 teaspoons)  Milk (1/2 cup)  Soft drink (non-diet, 1/2 cup)    Wait 15 minutes and check your blood glucose again.  IF it is still below 70 mg/dl, have another food item listed above. Wait another 15 minutes and repeat the blood glucose test.  Have a small meal or snack that contains some carbohydrate after your blood glucose rises above 70 mg/dl.    If you are at risk of hypoglycemia, always carry with you glucose tablets or one of the foods listed above.      To prevent Hypoglycemia:  Avoid situations that may cause hypoglycemia  Before making any change to your diet or exercise routine, discuss them with your healthcare provider  Keep a record of your blood glucose levels.  Include the time of day, diabetes medications, when you had your last meal or snack, and what you were doing at the time (e.g. Watching TV, gardening, jogging,  etc).    Talk to your healthcare provider if your blood glucose levels are often low        Patient guide on hypoglycemia    [email or web address]-[email or web address]-management-[email or web address]

## 2023-08-03 NOTE — PROGRESS NOTES
ENDOCRINOLOGY CLINIC NOTE:  Name: Francois Fontana  Seen for f/u of  Diabetes.  HPI:  Francois Fontana is a 68 year old male who presents for the evaluation/management of Diabetes.   has a past medical history of Diabetes mellitus, type 2 (H), Hyperlipidemia, Rosacea, Stenosis of left subclavian artery (H), Type II or unspecified type diabetes mellitus without mention of complication, not stated as uncontrolled, and Unspecified essential hypertension.   Was seen by  and Sherri Mayer before.    He is not checking BG.  He is not going to gym.  He had shoulder surgery for rotator cuff injury. It is now better.  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    He is resistance to take insulin.    A1c increased.    Wt Readings from Last 2 Encounters:   08/03/23 131 kg (288 lb 11.2 oz)   05/15/23 129.9 kg (286 lb 6.4 oz)       1. Type 2 DM:  Orginally diagnosed at the age of: Original diagnosis in 1990.   Current Regimen:   Metformin 1000 mg twice a day  Jardiance 25 mg once a day  Glipizide XL 10 mg twice a day  Actos 45 mg once a day  Victoza 1.8 mg once a day    yes:     Diabetes Medication(s)       Biguanides       metFORMIN (GLUCOPHAGE) 1000 MG tablet    TAKE 1 TABLET (1,000 MG) BY MOUTH 2 TIMES DAILY (WITH MEALS)     Patient taking differently: Take 1,000 mg by mouth 2 times daily (with meals) Through Endocrinologist      Sodium-Glucose Co-Transporter 2 (SGLT2) Inhibitors       JARDIANCE 25 MG TABS tablet    TAKE ONE TABLET BY MOUTH ONCE DAILY      Sulfonylureas       glipiZIDE (GLUCOTROL XL) 10 MG 24 hr tablet    TAKE TWO TABLETS BY MOUTH ONCE DAILY      Insulin Sensitizing Agents       pioglitazone (ACTOS) 45 MG tablet    TAKE ONE TABLET BY MOUTH ONCE DAILY     Patient taking differently: Through Endocrinologist      Incretin Mimetic Agents       VICTOZA PEN 18 MG/3ML soln    INJECT 1.8 MG UNDER THE SKIN DAILY            BS checks: not checking BG- it hurts  Average Meter Download: Patient is  not checking BG. Without Blood sugar data it is difficult to make adjustment to medical regimen.     Exercise: Limited  Last A1c: 10.1%  Symptoms of hypoglycemia (low blood sugar):  Gets symptoms of hypoglycemia.  Episodes of hypoglycemia: Rare    DM Complications:   Complications:   Diabetes Complications  Description / Detail    Diabetic Retinopathy  No   CAD / PAD  No   Neuropathy  No   Nephropathy / Microalbuminuria     + microalbuminuria--on valsartan        Gastroparesis  No   Hypoglycemia [place]arness  No       2. Hypertension:    on medications  3. Hyperlipidemia: on medications    PMH/PSH:  Past Medical History:   Diagnosis Date    Diabetes mellitus, type 2 (H)     Hyperlipidemia     Rosacea     Stenosis of left subclavian artery (H)     Type II or unspecified type diabetes mellitus without mention of complication, not stated as uncontrolled     Abstracted 02    Unspecified essential hypertension      Past Surgical History:   Procedure Laterality Date    HERNIA REPAIR, INGUINAL RT/LT      right     Family Hx:  Family History   Problem Relation Age of Onset    Cancer Father          of lung cancer    C.A.D. Father         Mult bypass operations    Diabetes Type 2  Father     Prostate Cancer Father     Respiratory Mother          of COPD    Gastrointestinal Disease Brother         Hemochromatosis    Gastrointestinal Disease Brother         Hemochromatosis    Gastrointestinal Disease Brother         Hemochromatosis ( in 3 bros)       Diabetes: Father    Social Hx:  Social History     Socioeconomic History    Marital status:      Spouse name: Not on file    Number of children: Not on file    Years of education: Not on file    Highest education level: Not on file   Occupational History    Not on file   Tobacco Use    Smoking status: Former     Types: Cigarettes     Quit date: 1985     Years since quittin.0    Smokeless tobacco: Never    Tobacco comments:     quit    Vaping  "Use    Vaping Use: Never used   Substance and Sexual Activity    Alcohol use: No     Alcohol/week: 0.0 standard drinks of alcohol     Comment: quit drinking age 30    Drug use: No    Sexual activity: Yes     Partners: Female   Other Topics Concern    Parent/sibling w/ CABG, MI or angioplasty before 65F 55M? Yes   Social History Narrative    Not on file     Social Determinants of Health     Financial Resource Strain: Not on file   Food Insecurity: Not on file   Transportation Needs: Not on file   Physical Activity: Not on file   Stress: Not on file   Social Connections: Not on file   Intimate Partner Violence: Not on file   Housing Stability: Not on file          MEDICATIONS:  has a current medication list which includes the following prescription(s): alcohol swab, amlodipine, aspirin, b-d u/f, accu-chek smartview, blood glucose calibration, blood glucose monitoring, doxycycline hyclate, glipizide, hydrochlorothiazide, jardiance, metformin, order for dme, pioglitazone, simvastatin, thin, valsartan, and victoza pen.    ROS     ROS: 10 point ROS neg other than the symptoms noted above in the HPI.    Physical Exam   VS: BP (!) 154/74 (BP Location: Left arm, Patient Position: Chair, Cuff Size: Adult Large)   Pulse 93   Temp 97.8  F (36.6  C) (Tympanic)   Resp 18   Ht 1.829 m (6' 0.01\")   Wt 131 kg (288 lb 11.2 oz)   SpO2 96%   BMI 39.15 kg/m    GENERAL: healthy, alert and no distress  EYES: Eyes grossly normal to inspection, conjunctivae and sclerae normal  ENT: no nose swelling, nasal discharge.  Thyroid: no apparent thyroid nodules.  Thyroid appears normal in size and nontender.  CV: RRR, no rubs, gallops, no murmurs  RESP: CTAB, no wheezes, rales, or ronchi  ABDO: +BS  EXTREMITIES: no hand tremors.  NEURO: Cranial nerves grossly intact, mentation intact and speech normal  SKIN: No apparent skin lesions, rash or edema seen   PSYCH: mentation appears normal, affect normal/bright, judgement and insight intact, " normal speech and appearance well-groomed      LABS:  A1c:  Lab Results   Component Value Date    A1C 10.1 05/25/2023    A1C 10.0 02/27/2023    A1C 8.5 09/13/2022    A1C 7.4 10/26/2021    A1C 8.6 06/10/2021    A1C 10.4 05/19/2021    A1C 12.0 04/29/2021    A1C 13.1 04/08/2021     Creatinine:  Creatinine   Date Value Ref Range Status   05/25/2023 1.32 (H) 0.67 - 1.17 mg/dL Final   09/13/2022 1.34 (A) 0.60 - 1.30 mg/dL Final     Comment:     ran twice        Urine Micro:  Lab Results   Component Value Date    UMALCR 114.75 05/25/2023    UMALCR 68.50 06/28/2021       LFTs/Lipids:  Recent Labs   Lab Test 05/25/23  0932 10/26/21  1044 09/15/16  1043 11/17/15  1006   CHOL 122 102   < > 149   HDL 38* 43   < > 40*   LDL 43 32   < > 70   TRIG 205* 137   < > 196*   CHOLHDLRATIO  --   --   --  3.7    < > = values in this interval not displayed.       All pertinent notes, labs, and images personally reviewed by me.     Glucometer/ insulin pump (if applicable)/ CGM data (if applicable) downloaded, Personally reviewed and interpreted.  All Blood sugar data reviewed personally and discussed with pt.      A/P  Mr.Raymond COLETTE Fontana is a 66 year old here for the evaluation/management of diabetes:    1. DM2 - Under Fair control.  A1c 10.1%.  Diabetes complicated by microalbuminuria.  Plan:  Discussed diagnosis, pathophysiology, management and treatment options of condition with pt.  I also discussed importance of strict blood sugar control to prevent complications associated with uncontrolled diabetes.  I discussed with patient that A1c is high and need to consider insulin but he is resistant to that idea.  He is working as a  and would like to avoid insulin.  Discussed switching to Ozempic or Mounjaro for added benefit of weight loss and he is agreeable to that.  Continue metformin 1000 mg twice a day ,Jardiance 25 mg once a day, Glipizide XL 10 mg twice a day and Actos 45 mg once a day  Stop Victoza.  Start Ozempic at 0.5  mg/week (as he is tolerating it was okay recommend to start Ozempic at 0.5 mg/week)  See Ana Maria Tovar PA-C in 4 weeks along with labs for blood sugar review and Ozempic titration  Freestyle laurel sample given.  He will check with his insurance if it is covered by insurance and will inform us if he needs prescription  Repeat labs and follow-up in 4-5 months.    2. Hypertension - Under Good control. On hydrochlorothiazide 25 mg, amlodipine 10 mg and valsartan 160 mg.  + microabuminuria: on Valsartan.    3. Hyperlipidemia - Under Good control. On simvastatin 20 mg. LDL 43.    4. Prevention:  Opthalmology- no  Recommend annually.  New referral in place  ASA- 81 mg/day  Smoking- former smoker    Foot Exam: Yes: 05/15/23     Most Recent Immunizations   Administered Date(s) Administered    COVID-19 Bivalent 12+ (Pfizer) 09/28/2022    COVID-19 MONOVALENT 12+ (Pfizer) 10/30/2021    COVID-19 Monovalent 12+ (Pfizer 2022) 05/06/2022    Flu, Unspecified 10/05/2020    Influenza (IIV3) PF 08/23/2014    Influenza (intradermal) 10/29/2018    Influenza Vaccine 50-64 or 18-64 w/egg allergy (Flublok) 10/05/2020    Influenza Vaccine 65+ (Fluzone HD) 09/28/2022    Influenza Vaccine >6 months (Alfuria,Fluzone) 09/15/2016    Pneumococcal 20 valent Conjugate (Prevnar 20) 05/15/2023    Pneumococcal 23 valent 11/02/2006    TDAP (Adacel,Boostrix) 10/20/2008    TDAP Vaccine (Adacel) 10/20/2008    Td (Adult), Adsorbed 03/13/2019         Recommend checking blood sugars before meals and at bedtime.    If Blood glucose are low more often-> 2-3 times/week- give us a call.  The patient is advised to Make better food choices: reduce carbs, Reduce portion size, weight loss and exercise 3-4 times a week.  Discussed hypoglycemia signs and symptoms as well as management in detail.    There is some variability among people, most will usually develop symptoms suggestive of hypoglycemia when blood glucose levels are lowered to the mid 60's. The first set of  symptoms are called adrenergic. Patients may experience any of the following nervousness, sweating, intense hunger, trembling, weakness, palpitations, and difficulty speaking.   The acute management of hypoglycemia involves the rapid delivery of a source of easily absorbed sugar. Regular soda, juice, lifesavers, table sugar, are good options. 15 grams of glucose is the dose that is given, followed by an assessment of symptoms and a blood glucose check if possible. If after 10 minutes there is no improvement, another 10-15 grams should be given. This can be repeated up to three times. The equivalency of 10-15 grams of glucose (approximate servings) are: Four lifesavers, 4 teaspoons of sugar, or 1/2 can of regular soda or juice.     All questions were answered.  The patient indicates understanding of the above issues and agrees with the plan set forth.     Follow-up:  4-5 months.    Maryam Doshi M.D  Endocrinology  [facility]  CC: Jagruti Figueredo    Disclaimer: This note consists of symbols derived from keyboarding, dictation and/or voice recognition software. As a result, there may be errors in the script that have gone undetected. Please consider this when interpreting information found in this chart.    Addendum to above note and clinic visit:    Labs reviewed.    See result note/telephone encounter.

## 2023-08-03 NOTE — NURSING NOTE
ENDOCRINOLOGY INTAKE FORM    Patient Name:  Francois Fontana  :  1955    Is patient Diabetic?   Yes: type 2  Does patient have non-diabetic or other endocrine issues?  Yes:    Morbid obesity (H)  Hyperlipidemia LDL goal <70       Vitals: There were no vitals taken for this visit.  BMI= There is no height or weight on file to calculate BMI.    Flu vaccine:  Yes:   Pneumonia vaccine:  Yes: 23 x 1, 20 x 1    Smoking and Alcohol use:  Social History     Tobacco Use    Smoking status: Former     Types: Cigarettes     Quit date: 1985     Years since quittin.0    Smokeless tobacco: Never    Tobacco comments:     quit    Vaping Use    Vaping Use: Never used   Substance Use Topics    Alcohol use: No     Alcohol/week: 0.0 standard drinks of alcohol     Comment: quit drinking age 30    Drug use: No       Foot Exam: Yes: 05/15/23  Eye Exam:  No  Dental Exam:    Aspirin Use:  Yes: 81 mg    Lab Results   Component Value Date    A1C 10.1 2023    A1C 10.0 2023    A1C 8.5 2022    A1C 7.4 10/26/2021    A1C 8.6 06/10/2021    A1C 10.4 2021    A1C 12.0 2021    A1C 13.1 2021       Lab Results   Component Value Date    MICROL 74.7 2023    MICROL 91 2021     No results found for: MICROALBUMIN    Kimberlee Poole CMA  [facility] [place]  [phone]

## 2023-08-09 NOTE — TELEPHONE ENCOUNTER
Hello,    Did the patient sign the application on 8/3 visit?    Thank You!    Neno Valverde [facility]  [email or web address]  Phone: [phone]  Fax: [phone]

## 2023-08-09 NOTE — TELEPHONE ENCOUNTER
It was signed on 07/12/23 and faxed.    Kimberlee Poole [facility]  [phone]

## 2023-08-09 NOTE — TELEPHONE ENCOUNTER
Oh okay, I didn't received the patient section of the application only the provider section. But that's great. I will check the status of the application.    Thank You!    Neno Valverde [facility] Pharm[facility] Liaison  [facility] [email or web address]  Phone: [phone]  Fax: [phone]

## 2023-08-09 NOTE — TELEPHONE ENCOUNTER
I've already received those on 7/12. That's two different applications from the provider. There's a patient section as well. I assume that the patient section was not done. No worries, I'll take care of it.    Thank You!    Neno Valverde [facility] Liaison  [facility] [email or web address]  Phone: [phone]  Fax: [phone]

## 2023-08-09 NOTE — TELEPHONE ENCOUNTER
I just got done talking to [employer] and they did not receive anything. We must have a misunderstanding.     On 7/10/23, I sent you the provider's section for both Paulette Nordisk and Jardiance. I received them completed by the provider on 07/12/2023. I tried contacting the patient however it would go straight to voicemail(which I did leave a voicemail)but never received a call back. I then asked if the patient has an appointment coming soon and I was told the patient will be coming in on 08/03/2023. I also asked if I should send the patient's section of the application as well which was declined. However, I did attach it to the patient's media tab just in case.     I never received the patient's completed section of the application. If it was completed please fax it to me or directly to [employer] and [employer].    Thank You!    Neno Valverde [facility] Pharm[facility] Liaison  [email or web address]  [email or web address]  Phone: [phone]  Fax: [phone]

## 2023-08-11 ENCOUNTER — NURSE TRIAGE (OUTPATIENT)
Dept: INTERNAL MEDICINE | Facility: CLINIC | Age: 68
End: 2023-08-11
Payer: COMMERCIAL

## 2023-08-11 NOTE — TELEPHONE ENCOUNTER
Nurse Triage SBAR    Is this a 2nd Level Triage? YES, LICENSED PRACTITIONER REVIEW IS REQUIRED    Situation: Patient calls back regarding side pain.    Background: Patient having side pain for several months, not going away, but not getting worse. He is concerned this could be something serious and wanting to have it evaluated.     Assessment: Right lower side pain started a few months. Pain mostly is on the side, but sometimes he feels this on the front of right lower abdomen. Pain is pinching feeling, rates at 6/10 in the mornings. Pain is worse when he first wakes up in the morning, then it will gradually improves as he drinks his coffee and eats breakfast. He states pain doesn't fully go away in the afternoon or evening, but he doesn't really notice it unless he thinks about it. He does not recall any injuries prior to pain starting. No changes to stools. Harder to urinate first thing in the morning, but this improves after drinking coffee in the morning. Normally drinks 3-4 cups a day. Drinking 1L Diet Coke or Pepsi per day, he will drink water with meals and amounts of water varies. He thinks his abdomen is larger over the past few months, but he is not sure if this is from weight gain. No new medications. He has been lift weights since first of the year to help with diabetes control (gained a lot of weight following shoulder surgery.     He had mentioned the pain  to Dr. Doshi at his appointment last week, but she didn't evaluate this further. He does not check glucoses, Dr. Doshi gave him a Tre meter/sensor to try, but he has not tried using this yet due to concerns about he needle on the sensor breaking off while lifting weights and going into a blood vessel. Informed patient that the needle on the sensor is very short and would be very unlikely that it would break off in his arm while exercising.     Protocol Recommended Disposition:   See in Office Today    Recommendation: Protocol recommends  appointment today due to his age. Will forward to provider for recommendations regarding follow-up as pain has been present for a few months and not changing.     Routed to provider    Does the patient meet one of the following criteria for ADS visit consideration? 16+ years old, with an [facility] PCP     TIP  Providers, please consider if this condition is appropriate for management at one of our Acute and Diagnostic Services sites.     If patient is a good candidate, please use dotphrase <dot>triageresponse and select Refer to ADS to document.    Louann Torres RN  [facility]      Reason for Disposition   Age > 60 years    Additional Information   Negative: Passed out (i.e., fainted, collapsed and was not responding)   Negative: Shock suspected (e.g., cold/pale/clammy skin, too weak to stand, low BP, rapid pulse)   Negative: Sounds like a life-threatening emergency to the triager   Negative: Chest pain   Negative: Pain is mainly in upper abdomen (if needed ask: 'is it mainly above the belly button?')   Negative: SEVERE abdominal pain (e.g., excruciating)   Negative: Bloody, black, or tarry bowel movements  (Exception: Chronic-unchanged black-grey bowel movements and is taking iron pills or Pepto-Bismol.)   Negative: Vomiting red blood or black (coffee ground) material   Negative: Unable to urinate (or only a few drops) and bladder feels very full   Negative: Pain in scrotum persists > 1 hour   Negative: Vomiting and abdomen looks much more swollen than usual   Negative: White of the eyes have turned yellow (i.e., jaundice)   Negative: Blood in urine (red, pink, or tea-colored)   Negative: Fever > 103 F (39.4 C)   Negative: Fever > 101 F (38.3 C) and over 60 years of age   Negative: Fever > 100.0 F (37.8 C) and has diabetes mellitus or a weak immune system (e.g., HIV positive, cancer chemotherapy, organ transplant, splenectomy, chronic steroids)   Negative: Fever > 100.0 F (37.8 C) and  bedridden (e.g., nursing home patient, stroke, chronic illness, recovering from surgery)   Negative: Constant abdominal pain lasting > 2 hours   Negative: Vomiting bile (green color)   Negative: Patient sounds very sick or weak to the triager   Negative: MODERATE pain (e.g., interferes with normal activities) that comes and goes (cramps) lasts > 24 hours  (Exception: pain with Vomiting or Diarrhea - see that Protocol)    Protocols used: Abdominal Pain - Male-A-[place]

## 2023-08-11 NOTE — TELEPHONE ENCOUNTER
Patient calls regarding right side pain. He has history of diabetes and not sure if there is something wrong with his liver or kidney to cause the pain. Asked patient for more information regarding location of pain and call was disconnected. Attempted to call patient back, call went directly to voice mail. Left voice message for patient to call back to speak to a nurse regarding symptoms.     Louann Torres RN  [facility]

## 2023-08-14 NOTE — TELEPHONE ENCOUNTER
Patient advised.  States sometimes he thinks the abdominal pain is because he drinks so many energy drinks but other times he feels the pain may be related to a hernia as it hurts when he tries to sit up in bed. Transferred to scheduling to take next available appt with any provider. DEVON Cooney R.N.

## 2023-08-15 NOTE — TELEPHONE ENCOUNTER
Update: patient will be receiving the patient section of the application directly from [employer] for Victoza and I will be emailing and mail the [employer](Jardiance) patient section to the patient.    Thank You!    Neno Valverde [facility] Pharmacy Liaison  [facility] [email or web address]  Phone: [phone]  Fax: [phone]

## 2023-08-23 NOTE — TELEPHONE ENCOUNTER
Update Paulette Nordisk: Pt section still not received.    Thank You!    Neno Valverde [facility] Pharmacy Liaison  [facility] Jerry [email or web address]  Phone: [phone]  Fax: [phone]

## 2023-08-29 DIAGNOSIS — E11.65 TYPE 2 DIABETES MELLITUS WITH HYPERGLYCEMIA, WITHOUT LONG-TERM CURRENT USE OF INSULIN (H): ICD-10-CM

## 2023-08-29 RX ORDER — LIRAGLUTIDE 6 MG/ML
INJECTION SUBCUTANEOUS
Qty: 18 ML | Refills: 0 | OUTPATIENT
Start: 2023-08-29

## 2023-08-30 RX ORDER — BLOOD-GLUCOSE CONTROL, NORMAL
EACH MISCELLANEOUS
Qty: 1 EACH | Refills: 0 | Status: SHIPPED | OUTPATIENT
Start: 2023-08-30

## 2023-08-30 RX ORDER — LIRAGLUTIDE 6 MG/ML
1.8 INJECTION SUBCUTANEOUS DAILY
Qty: 27 ML | Refills: 0 | Status: SHIPPED | OUTPATIENT
Start: 2023-08-30 | End: 2024-06-24

## 2023-08-30 NOTE — TELEPHONE ENCOUNTER
"Requested Prescriptions   Pending Prescriptions Disp Refills    VICTOZA PEN 18 MG/3ML soln [Pharmacy Med Name: VICTOZA 18MG/3ML SOPN] 27 mL 0     Sig: INJECT 1.8 MG UNDER THE SKIN DAILY       GLP-1 Agonists Protocol Failed - 8/29/2023  5:46 PM        Failed - HgbA1C in past 3 or 6 months     If HgbA1C is 8 or greater, it needs to be on file within the past 3 months.  If less than 8, must be on file within the past 6 months.     Recent Labs   Lab Test 05/25/23  0932   A1C 10.1*             Failed - Medication is active on med list        Failed - Normal serum creatinine on file in past 12 months     Recent Labs   Lab Test 05/25/23  0932   CR 1.32*       Ok to refill medication if creatinine is low          Passed - Patient is age 18 or older        Passed - Recent (6 mo) or future (30 days) visit within the authorizing provider's specialty     Patient had office visit in the last 6 months or has a visit in the next 30 days with authorizing provider.  See \"Patient Info\" tab in inbasket, or \"Choose Columns\" in Meds & Orders section of the refill encounter.              blood glucose monitoring (NO BRAND SPECIFIED) meter device kit 1 kit 0     Sig: Use to test blood sugar 1 times daily.       There is no refill protocol information for this order       blood glucose (NO BRAND SPECIFIED) test strip 100 strip 0     Sig: Use to test blood sugar 1 times daily       There is no refill protocol information for this order       blood glucose (NO BRAND SPECIFIED) lancets standard 100 each 0     Sig: Use to test blood sugar 1 times daily       There is no refill protocol information for this order       blood glucose calibration (NO BRAND SPECIFIED) solution 1 each 0     Sig: Use to calibrate blood glucose monitor as needed as directed.       There is no refill protocol information for this order          "

## 2023-08-31 ENCOUNTER — OFFICE VISIT (OUTPATIENT)
Dept: OPTOMETRY | Facility: CLINIC | Age: 68
End: 2023-08-31
Attending: INTERNAL MEDICINE
Payer: COMMERCIAL

## 2023-08-31 DIAGNOSIS — E11.3293 MILD NONPROLIFERATIVE DIABETIC RETINOPATHY OF BOTH EYES WITHOUT MACULAR EDEMA ASSOCIATED WITH TYPE 2 DIABETES MELLITUS (H): ICD-10-CM

## 2023-08-31 DIAGNOSIS — H52.203 ASTIGMATISM OF BOTH EYES, UNSPECIFIED TYPE: Primary | ICD-10-CM

## 2023-08-31 DIAGNOSIS — H26.9 BILATERAL INCIPIENT CATARACTS: ICD-10-CM

## 2023-08-31 DIAGNOSIS — E11.21 TYPE 2 DIABETES MELLITUS WITH DIABETIC NEPHROPATHY, WITHOUT LONG-TERM CURRENT USE OF INSULIN (H): ICD-10-CM

## 2023-08-31 DIAGNOSIS — H52.4 PRESBYOPIA: ICD-10-CM

## 2023-08-31 PROCEDURE — 92014 COMPRE OPH EXAM EST PT 1/>: CPT | Performed by: OPTOMETRIST

## 2023-08-31 PROCEDURE — 92015 DETERMINE REFRACTIVE STATE: CPT | Performed by: OPTOMETRIST

## 2023-08-31 ASSESSMENT — REFRACTION_MANIFEST
OD_ADD: +2.50
OS_SPHERE: -0.75
METHOD_AUTOREFRACTION: 1
OS_AXIS: 103
OS_CYLINDER: +0.25
OS_ADD: +2.50
OD_CYLINDER: +1.00
OD_CYLINDER: +1.75
OD_SPHERE: -0.25
OS_SPHERE: -0.25
OS_AXIS: 088
OS_CYLINDER: +0.50
OD_AXIS: 095
OD_SPHERE: -1.25
OD_AXIS: 107

## 2023-08-31 ASSESSMENT — EXTERNAL EXAM - RIGHT EYE: OD_EXAM: NORMAL

## 2023-08-31 ASSESSMENT — CONF VISUAL FIELD
OS_SUPERIOR_NASAL_RESTRICTION: 0
OD_SUPERIOR_TEMPORAL_RESTRICTION: 0
METHOD: COUNTING FINGERS
OD_INFERIOR_TEMPORAL_RESTRICTION: 0
OD_INFERIOR_NASAL_RESTRICTION: 0
OS_SUPERIOR_TEMPORAL_RESTRICTION: 0
OS_NORMAL: 1
OD_NORMAL: 1
OD_SUPERIOR_NASAL_RESTRICTION: 0
OS_INFERIOR_NASAL_RESTRICTION: 0
OS_INFERIOR_TEMPORAL_RESTRICTION: 0

## 2023-08-31 ASSESSMENT — VISUAL ACUITY
OD_SC: 20/20
OD_SC+: -2
OD_SC: 20/60
OS_SC: 20/20
METHOD: SNELLEN - LINEAR
OS_SC: 20/40
OS_SC+: -2

## 2023-08-31 ASSESSMENT — EXTERNAL EXAM - LEFT EYE: OS_EXAM: NORMAL

## 2023-08-31 ASSESSMENT — KERATOMETRY
OS_K2POWER_DIOPTERS: 44
OD_K2POWER_DIOPTERS: 44.37
OD_AXISANGLE2_DEGREES: 169
OS_AXISANGLE_DEGREES: 69
OD_AXISANGLE_DEGREES: 79
OS_K1POWER_DIOPTERS: 43.50
OD_K1POWER_DIOPTERS: 43.12
OS_AXISANGLE2_DEGREES: 159

## 2023-08-31 ASSESSMENT — TONOMETRY
OD_IOP_MMHG: 15
OS_IOP_MMHG: 15
IOP_METHOD: APPLANATION

## 2023-08-31 ASSESSMENT — CUP TO DISC RATIO
OS_RATIO: 0.2
OD_RATIO: 0.2

## 2023-08-31 NOTE — PROGRESS NOTES
Chief Complaint   Patient presents with    Diabetic Eye Exam       Lab Results   Component Value Date    A1C 10.1 05/25/2023    A1C 10.0 02/27/2023    A1C 8.5 09/13/2022    A1C 7.4 10/26/2021    A1C 8.6 06/10/2021    A1C 10.4 05/19/2021    A1C 12.0 04/29/2021    A1C 13.1 04/08/2021            Last Eye Exam: 01/2022  Dilated Previously: Yes, side effects of dilation explained today    What are you currently using to see?  Readers - rarely     Distance Vision Acuity: Satisfied with vision    Near Vision Acuity: Satisfied with vision while reading and using computer with readers    Eye Comfort: good  Do you use eye drops? : No      Nasima Myers - Optometric Assistant      Medical, surgical and family histories reviewed and updated 8/31/2023.     Final Rx     Sphere Cylinder Axis Dist VA Add   Right -1.50 +2.00 112 20/20 +2.50   Left -0.75 +0.50 095 20/20 +2.50   Expiration Date: 1/18/2024      OBJECTIVE: See Ophthalmology exam    ASSESSMENT:    ICD-10-CM    1. Type 2 diabetes mellitus with diabetic nephropathy, without long-term current use of insulin (H)  E11.21 Adult Eye  Referral          PLAN:    Francois Fontana aware  eye exam results will be sent to Delano Murray OD

## 2023-09-11 ENCOUNTER — LAB (OUTPATIENT)
Dept: LAB | Facility: CLINIC | Age: 68
End: 2023-09-11
Payer: COMMERCIAL

## 2023-09-11 DIAGNOSIS — E11.21 TYPE 2 DIABETES MELLITUS WITH DIABETIC NEPHROPATHY, WITHOUT LONG-TERM CURRENT USE OF INSULIN (H): ICD-10-CM

## 2023-09-11 LAB — HBA1C MFR BLD: 9.3 % (ref 0–5.6)

## 2023-09-11 PROCEDURE — 83036 HEMOGLOBIN GLYCOSYLATED A1C: CPT

## 2023-09-11 PROCEDURE — 36415 COLL VENOUS BLD VENIPUNCTURE: CPT

## 2023-09-11 NOTE — RESULT ENCOUNTER NOTE
Francois    Labs/ Imaging studies done with endocrinology are attached.  Lab Results       Component                Value               Date                       A1C                      9.3                 09/11/2023                 A1C                      10.1                05/25/2023              Follow-up in endocrinology Clinic as scheduled 12/27/23.    Please call endocrinology clinic if questions.    Please send a letter with above lab/test results and above comments.    Thank you.    Maryam Doshi MD

## 2023-09-11 NOTE — LETTER
September 12, 2023      Eric Fontana  [address] [address]        Dear ,    We are writing to inform you of your test results.    Labs/ Imaging studies done with endocrinology are attached.  Lab Results       Component                Value               Date                       A1C                      9.3                 09/11/2023                 A1C                      10.1                05/25/2023                    Follow-up in endocrinology Clinic as scheduled 12/27/23.    Resulted Orders   Hemoglobin A1c   Result Value Ref Range    Hemoglobin A1C 9.3 (H) 0.0 - 5.6 %      Comment:      Normal <5.7%   Prediabetes 5.7-6.4%    Diabetes 6.5% or higher     Note: Adopted from ADA consensus guidelines.       If you have any questions or concerns, please call the clinic at the number listed above.       Sincerely,      Maryam Doshi MD

## 2023-09-15 NOTE — TELEPHONE ENCOUNTER
LM on  to call this RN back if he should call the main clinic please forward to me [phone]  Shreya Cisneros RN     Ivermectin Counseling:  Patient instructed to take medication on an empty stomach with a full glass of water.  Patient informed of potential adverse effects including but not limited to nausea, diarrhea, dizziness, itching, and swelling of the extremities or lymph nodes.  The patient verbalized understanding of the proper use and possible adverse effects of ivermectin.  All of the patient's questions and concerns were addressed.

## 2023-09-19 ENCOUNTER — OFFICE VISIT (OUTPATIENT)
Dept: ENDOCRINOLOGY | Facility: CLINIC | Age: 68
End: 2023-09-19
Payer: COMMERCIAL

## 2023-09-19 VITALS
SYSTOLIC BLOOD PRESSURE: 131 MMHG | WEIGHT: 286.8 LBS | BODY MASS INDEX: 38.85 KG/M2 | TEMPERATURE: 98.3 F | HEIGHT: 72 IN | DIASTOLIC BLOOD PRESSURE: 66 MMHG | HEART RATE: 106 BPM | RESPIRATION RATE: 18 BRPM | OXYGEN SATURATION: 95 %

## 2023-09-19 DIAGNOSIS — E11.65 TYPE 2 DIABETES MELLITUS WITH HYPERGLYCEMIA, WITHOUT LONG-TERM CURRENT USE OF INSULIN (H): Primary | ICD-10-CM

## 2023-09-19 PROCEDURE — 99213 OFFICE O/P EST LOW 20 MIN: CPT | Performed by: PHYSICIAN ASSISTANT

## 2023-09-19 NOTE — LETTER
9/19/2023         RE: Francois Fontana  [address] [facility] [address]        Dear Colleague,    Thank you for referring your patient, Francois Fontana, to the [facility]. Please see a copy of my visit note below.    Assessment/Plan :   1.Type 2 DM, uncontrolled. We spent the majority of the visit discussing how the FreeStyle Tre works and how it helps with blood sugar management. He does not want to use his phone to monitor his blood sugars. He is wary of the internet and the access that the government may have to private health data. He would prefer to use a device that does not connect to the internet. I will send in a new prescription for the FreeStyle Tre 2 [place]. He is also considering going back to work. He is motivated to get his A1C under 8%. He does not want to make any medication changes at this time. I really feel that the CGMS sensor will help him make better dietary choices. He will  the Tre [place] and he will contact our office to have the sensor placed and started. He will keep his follow-up appt with Dr. Doshi in December.      I have independently reviewed and interpreted labs, imaging as indicated.      Chief complaint:  Francois is a 68 year old male who returns for follow-up of Type 2 DM.    I have reviewed Care Everywhere including [facility], [facility], [facility], [facility], [facility] , , [place] lab reports, imaging reports and provider notes as indicated.      HISTORY OF PRESENT ILLNESS  Eric remains stable on his current medications and he is still not monitoring her blood sugars. He was given a FreeStyle Tre 2 sensor at his last visit but he never set up the device. He is not comfortable using his phone and the internet to monitor his blood sugars. He doesn't want the government to know anything about his health. He remains stable on 1.8 mg of Victoza daily, pioglitazaone 45 mg daily,  metfromin 1000 mg twice daily and Jardiance 25 mg daily. While he is taking these medications now, he would like to discontinue the Jardiance and Victoza due to cost.    Eric has had diabetes since 1990. 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 He has taken a variety of oral medications over the years. He feels like he had much better control over his blood sugars before [facility]. He knows that diet plays a big role in blood sugar control but he struggles to find healthy options. He also understands the importance of glucose monitoring but he states that he just got tired of poking his fingers.    He has no previous hospitalizations for DKA or hypoglycemia. However, he states that his blood sugars are either too high or too low. When he was monitoring her blood sugars, he states that he was experiencing a lot of glucose fluctuation. He has not had any problems with blurred vision and he denies any worsening numbness/tingling in his feet. He does have a history of microalbuminuria and he is currently stable on once daily valsartan.    Endocrine relevant labs are as follows:   Latest Reference Range & Units 09/11/23 09:30   Hemoglobin A1C 0.0 - 5.6 % 9.3 (H)   (H): Data is abnormally high   Latest Reference Range & Units 05/25/23 09:55   Albumin Urine mg/g Cr 0.00 - 17.00 mg/g Cr 114.75 (H)   (H): Data is abnormally high   Latest Reference Range & Units 05/25/23 09:55   Albumin Urine mg/L mg/L 74.7     REVIEW OF SYSTEMS    Endocrine: positive for diabetes and obesity  Skin: positive for pigmentation  Eyes: negative for, visual blurring, redness, tearing  Ears/Nose/Throat: negative for, persistent sore throat, hoarseness  Respiratory: No shortness of breath, dyspnea on exertion, cough, or hemoptysis  Cardiovascular: negative for, chest pain, lower extremity edema, and exercise intolerance  Gastrointestinal: negative for, nausea, vomiting, constipation, and  diarrhea  Genitourinary: negative for, nocturia, dysuria, frequency, and urgency  Musculoskeletal: negative for, muscular weakness, and nocturnal cramping  Neurologic: negative for and numbness or tingling of feet  Psychiatric: negative  Hematologic/Lymphatic/Immunologic: negative    Past Medical History  Past Medical History:   Diagnosis Date     Diabetes mellitus, type 2 (H)      Hyperlipidemia      Rosacea      Stenosis of left subclavian artery (H)      Type II or unspecified type diabetes mellitus without mention of complication, not stated as uncontrolled     Abstracted 07/18/02     Unspecified essential hypertension        Medications  Current Outpatient Medications   Medication Sig Dispense Refill     alcohol swab prep pads Use to swab area of injection/jorge as directed. 100 each 3     amLODIPine (NORVASC) 10 MG tablet Take 1 tablet (10 mg) by mouth daily 90 tablet 1     aspirin 81 MG EC tablet Take 81 mg by mouth daily       blood glucose (NO BRAND SPECIFIED) lancets standard Use to test blood sugar 1 times daily 100 each 0     blood glucose (NO BRAND SPECIFIED) test strip Use to test blood sugar 1 times daily 100 strip 0     blood glucose calibration (NO BRAND SPECIFIED) solution Use to calibrate blood glucose monitor as needed as directed. 1 each 0     blood glucose monitoring (NO BRAND SPECIFIED) meter device kit Use to test blood sugar 1 times daily. 1 kit 0     Continuous Blood Gluc  (FREESTYLE JANESSA 2 READER) KADY Use to read blood sugars as per 's instructions. 1 each 0     Continuous Blood Gluc Sensor (FREESTYLE JANESSA 2 SENSOR) MISC Change every 14 days. 2 each 5     doxycycline hyclate (VIBRAMYCIN) 100 MG capsule TAKE 1 CAPSULE BY MOUTH 2 TIMES DAILY 180 capsule 3     empagliflozin (JARDIANCE) 25 MG TABS tablet Take 1 tablet (25 mg) by mouth daily 90 tablet 1     glipiZIDE (GLUCOTROL XL) 10 MG 24 hr tablet Take 2 tablets (20 mg) by mouth daily 180 tablet 1      hydrochlorothiazide (HYDRODIURIL) 25 MG tablet Take 1 tablet (25 mg) by mouth daily 90 tablet 1     insulin pen needle (B-D U/F) 31G X 5 MM miscellaneous Use 1 pen needles daily or as directed. 100 each 0     metFORMIN (GLUCOPHAGE) 1000 MG tablet Take 1 tablet (1,000 mg) by mouth 2 times daily (with meals) 180 tablet 1     order for DME Patient Francois Fontana was set up at [facility] on 2015. Patient received a Alayna [employer]s DreamStation Auto CPAP Auto. Pressures were set at Auto 6 - 12 cm H2O.   Patient s ramp is 5 cm H2O for 30 min and FLEX/EPR is A Flex.  Patient received a Alayna Respironics Mask name: Wisp  Nasal mask Size Small, Medium, Large, heated tubing and heated humidifier.  Patient is enrolled in the STM Program and does need to meet compliance. Patient has a follow up on 11/4/15 with Bennett Goltz, PA.     Myra Lim (entered by Nitin Wilson)       pioglitazone (ACTOS) 45 MG tablet Take 1 tablet (45 mg) by mouth daily 90 tablet 1     simvastatin (ZOCOR) 20 MG tablet Take 1 tablet (20 mg) by mouth At Bedtime 90 tablet 1     valsartan (DIOVAN) 160 MG tablet Take 1 tablet (160 mg) by mouth daily 90 tablet 1     VICTOZA PEN 18 MG/3ML soln INJECT 1.8 MG UNDER THE SKIN DAILY 27 mL 0       Allergies  No Known Allergies      Family History  family history includes C.A.D. in his father; Cancer in his father; Diabetes Type 2  in his father; Gastrointestinal Disease in his brother, brother, and brother; Prostate Cancer in his father; Respiratory in his mother.    Social History  Social History     Tobacco Use     Smoking status: Former     Types: Cigarettes     Quit date: 1985     Years since quittin.1     Smokeless tobacco: Never     Tobacco comments:     quit    Vaping Use     Vaping Use: Never used   Substance Use Topics     Alcohol use: No     Alcohol/week: 0.0 standard drinks of alcohol     Comment: quit drinking age 30     Drug use: No       Physical Exam  /66  "(BP Location: Left arm, Patient Position: Chair, Cuff Size: Adult Large)   Pulse 106   Temp 98.3  F (36.8  C) (Tympanic)   Resp 18   Ht 1.829 m (6' 0.01\")   Wt 130.1 kg (286 lb 12.8 oz)   SpO2 95%   BMI 38.89 kg/m    Body mass index is 38.89 kg/m .  GENERAL :  In no apparent distress  SKIN: Normal color, normal temperature, texture.  No hirsutism, alopecia or purple striae.     EYES: PERRL, EOMI, No scleral icterus,  No proptosis, conjunctival redness, stare, retraction  NEURO: awake, alert, responds appropriately to questions.  Cranial nerves intact.   Moves all extremities; Gait normal.  No tremor of the outstretched hand.    EXTREMITIES: No clubbing, cyanosis or edema.    DATA REVIEW  Pt does not monitor his blood sugars        Again, thank you for allowing me to participate in the care of your patient.        Sincerely,        Ana Maria Tovar PA-C  "

## 2023-09-20 NOTE — PROGRESS NOTES
Assessment/Plan :   1.Type 2 DM, uncontrolled. We spent the majority of the visit discussing how the FreeStyle Tre works and how it helps with blood sugar management. He does not want to use his phone to monitor his blood sugars. He is wary of the internet and the access that the government may have to private health data. He would prefer to use a device that does not connect to the internet. I will send in a new prescription for the FreeStyle Tre 2 [place]. He is also considering going back to work. He is motivated to get his A1C under 8%. He does not want to make any medication changes at this time. I really feel that the CGMS sensor will help him make better dietary choices. He will  the Tre [place] and he will contact our office to have the sensor placed and started. He will keep his follow-up appt with Dr. Doshi in December.      I have independently reviewed and interpreted labs, imaging as indicated.      Chief complaint:  Francois is a 68 year old male who returns for follow-up of Type 2 DM.    I have reviewed Care Everywhere including [facility], [facility], [place],[facility], [facility], [facility], [facility] , [facility], [place] lab reports, imaging reports and provider notes as indicated.      HISTORY OF PRESENT ILLNESS  Eric remains stable on his current medications and he is still not monitoring her blood sugars. He was given a FreeStyle Tre 2 sensor at his last visit but he never set up the device. He is not comfortable using his phone and the internet to monitor his blood sugars. He doesn't want the government to know anything about his health. He remains stable on 1.8 mg of Victoza daily, pioglitazaone 45 mg daily, metfromin 1000 mg twice daily and Jardiance 25 mg daily. While he is taking these medications now, he would like to discontinue the Jardiance and Victoza due to cost.    Eric has had diabetes since 1990. 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 He has taken a variety of oral medications over the years. He feels like he had much better control over his blood sugars before long-term. He knows that diet plays a big role in blood sugar control but he struggles to find healthy options. He also understands the importance of glucose monitoring but he states that he just got tired of poking his fingers.    He has no previous hospitalizations for DKA or hypoglycemia. However, he states that his blood sugars are either too high or too low. When he was monitoring her blood sugars, he states that he was experiencing a lot of glucose fluctuation. He has not had any problems with blurred vision and he denies any worsening numbness/tingling in his feet. He does have a history of microalbuminuria and he is currently stable on once daily valsartan.    Endocrine relevant labs are as follows:   Latest Reference Range & Units 09/11/23 09:30   Hemoglobin A1C 0.0 - 5.6 % 9.3 (H)   (H): Data is abnormally high   Latest Reference Range & Units 05/25/23 09:55   Albumin Urine mg/g Cr 0.00 - 17.00 mg/g Cr 114.75 (H)   (H): Data is abnormally high   Latest Reference Range & Units 05/25/23 09:55   Albumin Urine mg/L mg/L 74.7     REVIEW OF SYSTEMS    Endocrine: positive for diabetes and obesity  Skin: positive for pigmentation  Eyes: negative for, visual blurring, redness, tearing  Ears/Nose/Throat: negative for, persistent sore throat, hoarseness  Respiratory: No shortness of breath, dyspnea on exertion, cough, or hemoptysis  Cardiovascular: negative for, chest pain, lower extremity edema, and exercise intolerance  Gastrointestinal: negative for, nausea, vomiting, constipation, and diarrhea  Genitourinary: negative for, nocturia, dysuria, frequency, and urgency  Musculoskeletal: negative for, muscular weakness, and nocturnal cramping  Neurologic: negative for and numbness or tingling of feet  Psychiatric: negative  Hematologic/Lymphatic/Immunologic:  negative    Past Medical History  Past Medical History:   Diagnosis Date    Diabetes mellitus, type 2 (H)     Hyperlipidemia     Rosacea     Stenosis of left subclavian artery (H)     Type II or unspecified type diabetes mellitus without mention of complication, not stated as uncontrolled     Abstracted 07/18/02    Unspecified essential hypertension        Medications  Current Outpatient Medications   Medication Sig Dispense Refill    alcohol swab prep pads Use to swab area of injection/jorge as directed. 100 each 3    amLODIPine (NORVASC) 10 MG tablet Take 1 tablet (10 mg) by mouth daily 90 tablet 1    aspirin 81 MG EC tablet Take 81 mg by mouth daily      blood glucose (NO BRAND SPECIFIED) lancets standard Use to test blood sugar 1 times daily 100 each 0    blood glucose (NO BRAND SPECIFIED) test strip Use to test blood sugar 1 times daily 100 strip 0    blood glucose calibration (NO BRAND SPECIFIED) solution Use to calibrate blood glucose monitor as needed as directed. 1 each 0    blood glucose monitoring (NO BRAND SPECIFIED) meter device kit Use to test blood sugar 1 times daily. 1 kit 0    Continuous Blood Gluc  (FREESTYLE JANESSA 2 READER) KADY Use to read blood sugars as per 's instructions. 1 each 0    Continuous Blood Gluc Sensor (FREESTYLE JANESSA 2 SENSOR) MISC Change every 14 days. 2 each 5    doxycycline hyclate (VIBRAMYCIN) 100 MG capsule TAKE 1 CAPSULE BY MOUTH 2 TIMES DAILY 180 capsule 3    empagliflozin (JARDIANCE) 25 MG TABS tablet Take 1 tablet (25 mg) by mouth daily 90 tablet 1    glipiZIDE (GLUCOTROL XL) 10 MG 24 hr tablet Take 2 tablets (20 mg) by mouth daily 180 tablet 1    hydrochlorothiazide (HYDRODIURIL) 25 MG tablet Take 1 tablet (25 mg) by mouth daily 90 tablet 1    insulin pen needle (B-D U/F) 31G X 5 MM miscellaneous Use 1 pen needles daily or as directed. 100 each 0    metFORMIN (GLUCOPHAGE) 1000 MG tablet Take 1 tablet (1,000 mg) by mouth 2 times daily (with meals) 180  "tablet 1    order for DME Patient Francois Fontana was set up at [facility] on 2015. Patient received a Alayna Respironics DreamStation Auto CPAP Auto. Pressures were set at Auto 6 - 12 cm H2O.   Patient s ramp is 5 cm H2O for 30 min and FLEX/EPR is A Flex.  Patient received a Alayna Respironics Mask name: Wisp  Nasal mask Size Small, Medium, Large, heated tubing and heated humidifier.  Patient is enrolled in the STM Program and does need to meet compliance. Patient has a follow up on 11/4/15 with Bennett Goltz, PA.     Myra Lim (entered by Nitin Wilson)      pioglitazone (ACTOS) 45 MG tablet Take 1 tablet (45 mg) by mouth daily 90 tablet 1    simvastatin (ZOCOR) 20 MG tablet Take 1 tablet (20 mg) by mouth At Bedtime 90 tablet 1    valsartan (DIOVAN) 160 MG tablet Take 1 tablet (160 mg) by mouth daily 90 tablet 1    VICTOZA PEN 18 MG/3ML soln INJECT 1.8 MG UNDER THE SKIN DAILY 27 mL 0       Allergies  No Known Allergies      Family History  family history includes C.A.D. in his father; Cancer in his father; Diabetes Type 2  in his father; Gastrointestinal Disease in his brother, brother, and brother; Prostate Cancer in his father; Respiratory in his mother.    Social History  Social History     Tobacco Use    Smoking status: Former     Types: Cigarettes     Quit date: 1985     Years since quittin.1    Smokeless tobacco: Never    Tobacco comments:     quit    Vaping Use    Vaping Use: Never used   Substance Use Topics    Alcohol use: No     Alcohol/week: 0.0 standard drinks of alcohol     Comment: quit drinking age 30    Drug use: No       Physical Exam  /66 (BP Location: Left arm, Patient Position: Chair, Cuff Size: Adult Large)   Pulse 106   Temp 98.3  F (36.8  C) (Tympanic)   Resp 18   Ht 1.829 m (6' 0.01\")   Wt 130.1 kg (286 lb 12.8 oz)   SpO2 95%   BMI 38.89 kg/m    Body mass index is 38.89 kg/m .  GENERAL :  In no apparent distress  SKIN: Normal color, normal " temperature, texture.  No hirsutism, alopecia or purple striae.     EYES: PERRL, EOMI, No scleral icterus,  No proptosis, conjunctival redness, stare, retraction  NEURO: awake, alert, responds appropriately to questions.  Cranial nerves intact.   Moves all extremities; Gait normal.  No tremor of the outstretched hand.    EXTREMITIES: No clubbing, cyanosis or edema.    DATA REVIEW  Pt does not monitor his blood sugars

## 2023-09-27 ENCOUNTER — TELEPHONE (OUTPATIENT)
Dept: ENDOCRINOLOGY | Facility: CLINIC | Age: 68
End: 2023-09-27
Payer: COMMERCIAL

## 2023-09-27 NOTE — TELEPHONE ENCOUNTER
Victoza medication received from  for pt, via Pt asst program. Placed in med room refrigerator.     Call to pt and advised. He agrees and will stop by to .

## 2023-11-06 NOTE — TELEPHONE ENCOUNTER
Hello,    Is the patient still to continue the use of Patient Assistance Program for Victoza and Jardiance? I never received the patient's section but it seems the patient has received Victoza from [employer]. The patient assistance will be expiring by the end of the year.    Please let me know if the patient will be continuing to pursue assistance for 2024.    Thank You!    Neno Valverde [facility] Pharmacy Liaison  [facility]  [email or web address]  Phone: [phone]  Fax: [phone]

## 2023-11-21 ENCOUNTER — TELEPHONE (OUTPATIENT)
Dept: ENDOCRINOLOGY | Facility: CLINIC | Age: 68
End: 2023-11-21

## 2023-11-21 NOTE — TELEPHONE ENCOUNTER
I have emailed the patient the application for Jardiance. Will check on status next week.    Thank You!    Neno Valverde [facility] Pharmacy Liaison  [facility] [place]  [email or web address]  Phone: [phone]  Fax: [phone]

## 2023-12-01 NOTE — TELEPHONE ENCOUNTER
"Hello,    I received this letter from [facility] Patient Assistance Program for Jardiance. It seems that patient may qualify for \"Low Income Subsidy\". The patient needs to reach out to Social Security at [phone] and if the patient is denied through Low Income Subsidy then that denial would need to be sent to [facility].    If there's any questions, feel free to contact me.    Thank You!    Neno Valverde [facility] Pharmacy Liaison  [facility] [email or web address]  Phone: [phone]  Fax: [phone]      "

## 2023-12-04 NOTE — TELEPHONE ENCOUNTER
"Hello,    The provider does not have to do anything. The patient has to go to a website you apply for \"Part D Extra Help\". If they get denied their then typically [employer] will approve their application.    Here's the website the patient needs to go through to apply for Extra Help:    https://www.ssa.gov/medicare/part-d-extra-help    Thank You!    Neno Valverde [facility] Pharmacy Liaison  [facility]  [email or web address]  Phone: [phone]  Fax: [phone]    "

## 2023-12-04 NOTE — TELEPHONE ENCOUNTER
I have not informed the patient just yet. I just wanted to let the provider know that it has been denied.    Thank You!    Neno Valverde [facility] Pharmacy Liaison  [facility] [place]  [email or web address]  Phone: [phone]  Fax: [phone]

## 2023-12-05 NOTE — TELEPHONE ENCOUNTER
Called to verify with [employer] again about what is needed or what specific verification Ray would need to provide. He would either need to provide a 1040 if they filed jointly or a W2 or paystubs for his spouse.    100

## 2023-12-06 NOTE — TELEPHONE ENCOUNTER
Update:    LVM for pt to call me back to discuss the denial and what's the next step that needs to be taken.    Thank You!    Neno Valverde [facility] Pharmacy Liaison  [facility] [email or web address]  Phone: [phone]  Fax: [phone]

## 2023-12-12 ENCOUNTER — OFFICE VISIT (OUTPATIENT)
Dept: INTERNAL MEDICINE | Facility: CLINIC | Age: 68
End: 2023-12-12
Payer: COMMERCIAL

## 2023-12-12 VITALS
SYSTOLIC BLOOD PRESSURE: 128 MMHG | HEIGHT: 72 IN | WEIGHT: 277.7 LBS | BODY MASS INDEX: 37.61 KG/M2 | OXYGEN SATURATION: 94 % | RESPIRATION RATE: 18 BRPM | TEMPERATURE: 97.7 F | DIASTOLIC BLOOD PRESSURE: 62 MMHG | HEART RATE: 115 BPM

## 2023-12-12 DIAGNOSIS — I10 ESSENTIAL HYPERTENSION WITH GOAL BLOOD PRESSURE LESS THAN 140/90: Primary | ICD-10-CM

## 2023-12-12 DIAGNOSIS — E11.65 TYPE 2 DIABETES MELLITUS WITH HYPERGLYCEMIA, WITHOUT LONG-TERM CURRENT USE OF INSULIN (H): ICD-10-CM

## 2023-12-12 PROCEDURE — 99214 OFFICE O/P EST MOD 30 MIN: CPT | Performed by: INTERNAL MEDICINE

## 2023-12-12 RX ORDER — HYDROCHLOROTHIAZIDE 25 MG/1
25 TABLET ORAL DAILY
Qty: 90 TABLET | Refills: 1 | Status: SHIPPED | OUTPATIENT
Start: 2024-01-01 | End: 2024-06-20

## 2023-12-12 RX ORDER — VALSARTAN 160 MG/1
160 TABLET ORAL DAILY
Qty: 90 TABLET | Refills: 1 | Status: SHIPPED | OUTPATIENT
Start: 2024-01-01 | End: 2024-04-15

## 2023-12-12 NOTE — PROGRESS NOTES
"  Assessment & Plan     Essential hypertension with goal blood pressure less than 140/90  Blood pressure reviewed, within target, target of less than 140/90.  Medication refill completed for start date at the start of next month.  - valsartan (DIOVAN) 160 MG tablet; Take 1 tablet (160 mg) by mouth daily  - hydrochlorothiazide (HYDRODIURIL) 25 MG tablet; Take 1 tablet (25 mg) by mouth daily    Type 2 diabetes mellitus with hyperglycemia, without long-term current use of insulin (H)  Patient follows up with endocrinology team.  Has completed A1c around 3 months ago.  Assistance provided today in office for attachment of the freestyle laurel.  Upcoming visit with endocrinology team in around 2 months.             BMI:   Estimated body mass index is 37.65 kg/m  as calculated from the following:    Height as of this encounter: 1.829 m (6' 0.01\").    Weight as of this encounter: 126 kg (277 lb 11.2 oz).           Katrin Mason MD  [facility] RAVINDER Reyes is a 68 year old, presenting for the following health issues:  Establish Care        12/12/2023     2:18 PM   Additional Questions   Roomed by Bina   Accompanied by Self       History of Present Illness       Diabetes:   He presents for follow up of diabetes.    He is not checking blood glucose.         He has no concerns regarding his diabetes at this time.   He is not experiencing numbness or burning in feet, excessive thirst, blurry vision, weight changes or redness, sores or blisters on feet.           Hypertension: He presents for follow up of hypertension.  He does not check blood pressure  regularly outside of the clinic. Outside blood pressures have been over 140/90. He follows a low salt diet.     He eats 0-1 servings of fruits and vegetables daily.He consumes 0 sweetened beverage(s) daily.He exercises with enough effort to increase his heart rate 20 to 29 minutes per day.  He exercises with enough effort to increase his heart rate " "4 days per week.   He is taking medications regularly.                 Review of Systems   Constitutional, HEENT, cardiovascular, pulmonary, gi and gu systems are negative, except as otherwise noted.      Objective    /62   Pulse 115   Temp 97.7  F (36.5  C) (Tympanic)   Resp 18   Ht 1.829 m (6' 0.01\")   Wt 126 kg (277 lb 11.2 oz)   SpO2 94%   BMI 37.65 kg/m    Body mass index is 37.65 kg/m .  Physical Exam   GENERAL: healthy, alert and no distress  RESP: lungs clear to auscultation - no rales, rhonchi or wheezes  CV: regular rate and rhythm, normal S1 S2  MS: no gross musculoskeletal defects noted, no edema  NEURO: Normal strength and tone, mentation intact and speech normal  PSYCH: mentation appears normal, affect normal                    "

## 2023-12-27 DIAGNOSIS — E11.65 TYPE 2 DIABETES MELLITUS WITH HYPERGLYCEMIA, WITHOUT LONG-TERM CURRENT USE OF INSULIN (H): ICD-10-CM

## 2023-12-27 RX ORDER — LANCETS
EACH MISCELLANEOUS
Qty: 100 EACH | Refills: 0 | Status: SHIPPED | OUTPATIENT
Start: 2023-12-27 | End: 2024-06-17

## 2023-12-27 RX ORDER — BLOOD SUGAR DIAGNOSTIC
STRIP MISCELLANEOUS
Qty: 100 STRIP | Refills: 0 | Status: SHIPPED | OUTPATIENT
Start: 2023-12-27 | End: 2024-06-17

## 2023-12-27 NOTE — TELEPHONE ENCOUNTER
"    Requested Prescriptions   Pending Prescriptions Disp Refills    blood glucose monitoring (SOFTCLIX) lancets [Pharmacy Med Name: ACCU-CHEK SOFTCLIX LANCETS  MISC] 100 each 0     Sig: USE TO TEST BLOOD SUGAR ONCE DAILY       Diabetic Supplies Protocol Failed - 12/27/2023 10:09 AM        Failed - Medication is active on med list        Passed - Patient is 18 years of age or older        Passed - Recent (6 mo) or future (30 days) visit within the authorizing provider's specialty     Patient had office visit in the last 6 months or has a visit in the next 30 days with authorizing provider.  See \"Patient Info\" tab in inbasket, or \"Choose Columns\" in Meds & Orders section of the refill encounter.              ACCU-CHEK GUIDE test strip [Pharmacy Med Name: ACCU-CHEK GUIDE  STRP] 100 strip 0     Sig: USE TO TEST BLOOD SUGAR ONCE DAILY       Diabetic Supplies Protocol Passed - 12/27/2023 10:09 AM        Passed - Medication is active on med list        Passed - Patient is 18 years of age or older        Passed - Recent (6 mo) or future (30 days) visit within the authorizing provider's specialty     Patient had office visit in the last 6 months or has a visit in the next 30 days with authorizing provider.  See \"Patient Info\" tab in inbasket, or \"Choose Columns\" in Meds & Orders section of the refill encounter.                 "

## 2024-01-05 ENCOUNTER — OFFICE VISIT (OUTPATIENT)
Dept: SLEEP MEDICINE | Facility: CLINIC | Age: 69
End: 2024-01-05
Payer: COMMERCIAL

## 2024-01-05 VITALS
OXYGEN SATURATION: 94 % | BODY MASS INDEX: 38.1 KG/M2 | DIASTOLIC BLOOD PRESSURE: 67 MMHG | SYSTOLIC BLOOD PRESSURE: 127 MMHG | HEART RATE: 101 BPM | WEIGHT: 281 LBS

## 2024-01-05 DIAGNOSIS — G47.33 OSA (OBSTRUCTIVE SLEEP APNEA): Primary | ICD-10-CM

## 2024-01-05 DIAGNOSIS — E66.01 MORBID OBESITY (H): ICD-10-CM

## 2024-01-05 DIAGNOSIS — E78.5 HYPERLIPIDEMIA LDL GOAL <70: ICD-10-CM

## 2024-01-05 DIAGNOSIS — I10 ESSENTIAL HYPERTENSION WITH GOAL BLOOD PRESSURE LESS THAN 140/90: ICD-10-CM

## 2024-01-05 PROCEDURE — 99204 OFFICE O/P NEW MOD 45 MIN: CPT | Performed by: PHYSICIAN ASSISTANT

## 2024-01-05 RX ORDER — FLURBIPROFEN SODIUM 0.3 MG/ML
SOLUTION/ DROPS OPHTHALMIC
Qty: 100 EACH | Refills: 0 | Status: SHIPPED | OUTPATIENT
Start: 2024-01-05 | End: 2024-06-17

## 2024-01-05 NOTE — PROGRESS NOTES
Outpatient Sleep Medicine Consultation:      Name: Francois Fontana MRN# [id number]   Age: 68 year old YOB: 1955     Date of Consultation: January 5, 2024  Consultation is requested by: No referring provider defined for this encounter. No ref. provider found  Primary care provider: Delano Murray       Reason for Sleep Consult:     Chief Complaint   Patient presents with    Sleep Problem     CPAP supplies          Assessment and Plan:     Summary Sleep Diagnoses:  1. JUSTIN (obstructive sleep apnea)  Comorbid Diagnoses:  2. Morbid obesity (H)  3. Essential hypertension with goal blood pressure less than 140/90    Patient presents to clinic today to reestablish care of his severe obstructive sleep apnea treated with CPAP therapy.  Review of his CPAP download shows excellent treatment of his sleep apnea at current pressure setting 6-12 cm H2O with low residual AHI of 2.5 events per hour.  His compliance is excellent.  Denies any insomnia or daytime sleepiness.  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  We did agreed to increase pressure settings on his machine today to 9-14 cm H2O as he describes some air hunger and wife notes residual snoring with mask on.  Prescription updated for new mask and supplies.  He will continue nightly use.  Offered him 1 year follow-up given that he is doing well versus follow-up in a few months to recheck progress on new settings and he would prefer to have a follow-up sometime this spring which is very reasonable, see him back in May for CPAP follow-up.  - Comprehensive DME         History of Present Illness:     Francois Fontana is a 68 male with obesity, T2DM, HTN, JUSTIN on CPAP who presents to clinic today to reestablish care of his JUSTIN.  He was last seen by my colleague Bennett Goltz, PA-C back in  "2015.    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 Split-night PSG completed 9/30/2015 (274.1#, BMI 36.3) revealing AHI 31.6, RDI 41.7, REM AHI-, supine AHI 70.3, oxygen obey 83%, 5.6 minutes spent less than 89%.  CPAP was titrated to 7 cm H2O and deemed optimal with residual AHI 2.2, though no REM supine. PLM's seen during study but not associated with cortical arousal.     Returns to clinic today to reestablish care.  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  No longer established with [facility], has been purchasing supplies out-of-pocket on [employer].    Patient is using a nasal mask. The mask is comfortable. The mask is not leaking. They are snoring with the mask on per wife. They are not having gasp arousals.  They are not having significant oral/nasal dryness or epistaxis.  They are not having pain/skin breakdown. The pressure settings are comfortable but wonders if he would benefit with more air.  He increase the ramp on his machine to start at 9 cm H2O and likes this.    Respironics Dreamstation 2 Auto-PAP 6-12 cmH2O download (12/6/2023 - 1/4/2024):  30 total days of use. 0 nonuse days.  Average use 6 hours 3 minutes per day.  100% days with >4 hours use.  Large leak 0 sec per day average. CPAP 90% pressure 11.1cm. AHI 2.5    Sleep schedule: Same on all days of the week with bedtime typically between 1030-11:00 PM and wake time 530-6:00 AM. Falls asleep in \"2 minutes at the most\". Denies nighttime awakenings other than occasional bathroom.     Does admit to daytime napping, though reports not because he needs to but because \"I'm bored not working right now \".  Does not wear his CPAP with naps. He had " a total [place] Sleepiness Scale of 4 (01/05/24 1053), with scores of 10 or higher indicating abnormal levels of sleepiness.     No other sleep concerns today.    SCALES:    EPWORTH SLEEPINESS SCALE         1/5/2024    10:53 AM    [place] Sleepiness Scale ( DARRON Campbell  [id number]<br>ESS - [place]/English - Final version - 21 Nov 07 - [facility] [place].)   [place] Score (Sleep) 4       INSOMNIA SEVERITY INDEX (ROSY)          1/5/2024    10:53 AM   Insomnia Severity Index (ROSY)   Difficulty falling asleep 0   Difficulty staying asleep 1   Problems waking up too early 0   How SATISFIED/DISSATISFIED are you with your CURRENT sleep pattern? 0   How NOTICEABLE to others do you think your sleep problem is in terms of impairing the quality of your life? 0   How WORRIED/DISTRESSED are you about your current sleep problem? 0   To what extent do you consider your sleep problem to INTERFERE with your daily functioning (e.g. daytime fatigue, mood, ability to function at work/daily chores, concentration, memory, mood, etc.) CURRENTLY? 0   ROSY Total Score 1       Guidelines for Scoring/Interpretation:  Total score categories:  0-7 = No clinically significant insomnia   8-14 = Subthreshold insomnia   15-21 = Clinical insomnia (moderate severity)  22-28 = Clinical insomnia (severe)  Used via courtesy of www.[email or web address].va.gov with permission from Chucho Donovan PhD., [facility]    Allergies:    No Known Allergies    Medications:    Current Outpatient Medications   Medication Sig Dispense Refill    ACCU-CHEK GUIDE test strip USE TO TEST BLOOD SUGAR ONCE DAILY 100 strip 0    alcohol swab prep pads Use to swab area of injection/jorge as directed. 100 each 3    amLODIPine (NORVASC) 10 MG tablet Take 1 tablet (10 mg) by mouth daily 90 tablet 1    aspirin 81 MG EC tablet Take 81 mg by mouth daily      blood glucose (NO BRAND SPECIFIED) lancets standard Use to test blood sugar 1 times daily 100 each 0    blood glucose calibration (NO  BRAND SPECIFIED) solution Use to calibrate blood glucose monitor as needed as directed. 1 each 0    blood glucose monitoring (NO BRAND SPECIFIED) meter device kit Use to test blood sugar 1 times daily. 1 kit 0    blood glucose monitoring (SOFTCLIX) lancets USE TO TEST BLOOD SUGAR ONCE DAILY 100 each 0    Continuous Blood Gluc  (FREESTYLE JANESSA 2 READER) KADY Use to read blood sugars as per 's instructions. 1 each 0    Continuous Blood Gluc Sensor (FREESTYLE JANESSA 2 SENSOR) MISC Change every 14 days. 2 each 5    doxycycline hyclate (VIBRAMYCIN) 100 MG capsule TAKE 1 CAPSULE BY MOUTH 2 TIMES DAILY 180 capsule 3    empagliflozin (JARDIANCE) 25 MG TABS tablet Take 1 tablet (25 mg) by mouth daily 90 tablet 1    glipiZIDE (GLUCOTROL XL) 10 MG 24 hr tablet Take 2 tablets (20 mg) by mouth daily 180 tablet 1    hydrochlorothiazide (HYDRODIURIL) 25 MG tablet Take 1 tablet (25 mg) by mouth daily 90 tablet 1    insulin pen needle (B-D U/F) 31G X 5 MM miscellaneous USE 1 PEN NEEDLE DAILY OR AS DIRECTED. 100 each 0    metFORMIN (GLUCOPHAGE) 1000 MG tablet Take 1 tablet (1,000 mg) by mouth 2 times daily (with meals) 180 tablet 1    order for DME Patient Francois Fontana was set up at [facility] on September 23, 2015. Patient received a [place] Respironics DreamStation Auto CPAP Auto. Pressures were set at Auto 6 - 12 cm H2O.   Patient s ramp is 5 cm H2O for 30 min and FLEX/EPR is A Flex.  Patient received a Alayna Respironics Mask name: Wisp  Nasal mask Size Small, Medium, Large, heated tubing and heated humidifier.  Patient is enrolled in the STM Program and does need to meet compliance. Patient has a follow up on 11/4/15 with Bennett Goltz, PA.     Myra Lim (entered by Nitin Wilson)      pioglitazone (ACTOS) 45 MG tablet Take 1 tablet (45 mg) by mouth daily 90 tablet 1    simvastatin (ZOCOR) 20 MG tablet Take 1 tablet (20 mg) by mouth At Bedtime 90 tablet 1    valsartan (DIOVAN) 160 MG tablet Take 1  tablet (160 mg) by mouth daily 90 tablet 1    VICTOZA PEN 18 MG/3ML soln INJECT 1.8 MG UNDER THE SKIN DAILY 27 mL 0       Problem List:  Patient Active Problem List    Diagnosis Date Noted    Essential hypertension with goal blood pressure less than 140/90 02/14/2003     Priority: High    Type 2 diabetes mellitus with hyperglycemia; A1c goal <7 07/22/2002     Priority: High     Dx 1990      Stage 3a chronic kidney disease (H) 05/16/2023     Priority: Medium    Health Care Home 09/13/2022     Priority: Medium    Bilateral incipient cataracts 08/14/2020     Priority: Medium    Type 2 diabetes mellitus with diabetic nephropathy (H) 11/20/2015     Priority: Medium    JUSTIN (obstructive sleep apnea) 09/22/2015     Priority: Medium    Family history of prostate cancer in father  10/05/2012     Priority: Medium    Morbid obesity (H) 10/05/2012     Priority: Medium    Advanced directives, counseling/discussion 08/25/2011     Priority: Medium     Advance Directive Problem List Overview:   Name Relationship Phone    Primary Health Care Agent            Alternative Health Care Agent          Discussed advance care planning with patient; information given to patient to review. 8/25/2011     Follow up to [facility] referral call placed. Patient would like to review document provided at visit, establish an agent and then will schedule facilitation if needed. Key Obando LPN/Advanced Healthcare Planning Facilitator   9/9/11          Stenosis of left subclavian artery (H24) 11/30/2010     Priority: Medium     Dr Gonzales consult - 2010 -recommended: reconsult if any symptoms develop      Hyperlipidemia LDL goal <70 10/31/2010     Priority: Medium    Rosacea 07/13/2004     Priority: Medium        Past Medical/Surgical History:  Past Medical History:   Diagnosis Date    Diabetes mellitus, type 2 (H)     Hyperlipidemia     Rosacea     Stenosis of left subclavian artery (H24)     Type II or unspecified type diabetes mellitus  without mention of complication, not stated as uncontrolled     Abstracted 02    Unspecified essential hypertension      Past Surgical History:   Procedure Laterality Date    HERNIA REPAIR, INGUINAL RT/LT  1993    right       Social History:  Social History     Socioeconomic History    Marital status:      Spouse name: Not on file    Number of children: Not on file    Years of education: Not on file    Highest education level: Not on file   Occupational History    Not on file   Tobacco Use    Smoking status: Former     Types: Cigarettes     Quit date: 1985     Years since quittin.4    Smokeless tobacco: Never    Tobacco comments:     quit    Vaping Use    Vaping Use: Never used   Substance and Sexual Activity    Alcohol use: No     Alcohol/week: 0.0 standard drinks of alcohol     Comment: quit drinking age 30    Drug use: No    Sexual activity: Yes     Partners: Female   Other Topics Concern    Parent/sibling w/ CABG, MI or angioplasty before 65F 55M? Yes   Social History Narrative    Not on file     Social Determinants of Health     Financial Resource Strain: Low Risk  (2023)    Financial Resource Strain     Within the past 12 months, have you or your family members you live with been unable to get utilities (heat, electricity) when it was really needed?: No   Food Insecurity: Low Risk  (2023)    Food Insecurity     Within the past 12 months, did you worry that your food would run out before you got money to buy more?: No     Within the past 12 months, did the food you bought just not last and you didn t have money to get more?: No   Transportation Needs: Low Risk  (2023)    Transportation Needs     Within the past 12 months, has lack of transportation kept you from medical appointments, getting your medicines, non-medical meetings or appointments, work, or from getting things that you need?: No   Physical Activity: Not on file   Stress: Not on file   Social Connections:  Not on file   Interpersonal Safety: Low Risk  (2023)    Interpersonal Safety     Do you feel physically and emotionally safe where you currently live?: Yes     Within the past 12 months, have you been hit, slapped, kicked or otherwise physically hurt by someone?: No     Within the past 12 months, have you been humiliated or emotionally abused in other ways by your partner or ex-partner?: No   Housing Stability: Low Risk  (2023)    Housing Stability     Do you have housing? : Yes     Are you worried about losing your housing?: No       Family History:  Family History   Problem Relation Age of Onset    Cancer Father          of lung cancer    C.A.D. Father         Mult bypass operations    Diabetes Type 2  Father     Prostate Cancer Father     Respiratory Mother          of COPD    Gastrointestinal Disease Brother         Hemochromatosis    Gastrointestinal Disease Brother         Hemochromatosis    Gastrointestinal Disease Brother         Hemochromatosis ( in 3 bros)       Physical Examination:  Vitals: /67   Pulse 101   Wt 127.5 kg (281 lb)   SpO2 94%   BMI 38.10 kg/m    BMI= Body mass index is 38.1 kg/m .  General appearance: Awake, alert, cooperative. Well groomed. Sitting comfortably in chair. In no apparent distress.  HEENT: Head: Normocephalic, atraumatic. Eyes: PERRL. Conjunctiva clear. Sclera normal. Nose: External appearance normal.  Neck: Neck Cir (cm): 51 cm (2024 10:53 AM)  Skin:  No rashes or significant lesions.   Neurologic: Alert, oriented x3. No focal neurological deficit. Gait normal.   Psychiatric: Mood euthymic. Affect congruent with full range and intensity.         Data: All pertinent previous laboratory data reviewed     Recent Labs   Lab Test 23  0932 22  1516 10/26/21  1044    138.1 137   POTASSIUM 4.8 4.81 4.9   CHLORIDE 105 106..6 109   CO2 22 23.8 19*   ANIONGAP 12  --  9   * 204* 95   BUN 31.5* 33*  24.3* 39*   CR 1.32* 1.34*  "1.22   LUIS 9.1 8.6 9.2       Recent Labs   Lab Test 05/25/23  0932   WBC 9.8   RBC 4.64   HGB 13.9   HCT 44.1   MCV 95   MCH 30.0   MCHC 31.5   RDW 14.5          Recent Labs   Lab Test 05/25/23  0932   PROTTOTAL 6.9   ALBUMIN 4.1   BILITOTAL 0.2   ALKPHOS 64   AST 20   ALT 26       TSH (mU/L)   Date Value   04/08/2021 1.51   08/05/2017 1.56       No results found for: \"UAMP\", \"UBARB\", \"BENZODIAZEUR\", \"UCANN\", \"UCOC\", \"OPIT\", \"UPCP\"    Iron Saturation Index   Date/Time Value Ref Range Status   01/12/2004 03:55 PM 42 25 - 50 % Final     Iron Sat Index   Date/Time Value Ref Range Status   10/26/2021 10:44 AM 30 15 - 46 % Final     Ferritin   Date/Time Value Ref Range Status   10/26/2021 10:44 AM 59 26 - 388 ng/mL Final       No results found for: \"PH\", \"PHARTERIAL\", \"PO2\", \"YG0LNRAEAEO\", \"SAT\", \"PCO2\", \"HCO3\", \"BASEEXCESS\", \"USAMA\", \"BEB\"    @LABRCNTIPR(phv:4,pco2v:4,po2v:4,hco3v:4,asa:4,o2per:4)@    Echocardiology: No results found for this or any previous visit (from the past 4320 hour(s)).    Chest x-ray: No results found for this or any previous visit from the past 365 days.      Chest CT: No results found for this or any previous visit from the past 365 days.      PFT: Most Recent [facility] Pulmonary Function Testing    No results found for: \"20001\"  No results found for: \"20002\"  No results found for: \"20003\"  No results found for: \"20015\"  No results found for: \"20016\"  No results found for: \"20027\"  No results found for: \"20028\"  No results found for: \"20029\"  No results found for: \"20079\"  No results found for: \"20080\"  No results found for: \"20081\"  No results found for: \"20335\"  No results found for: \"20105\"  No results found for: \"20053\"  No results found for: \"20054\"  No results found for: \"20055\"      Maranda Potter PA-C 1/5/2024     [facility]  [address], [place] [address]     [facility]  [address], " [place] [id number]    Chart documentation was completed, in part, with [employer] voice-recognition software. Even though reviewed, some grammatical, spelling, and word errors may remain.    52 minutes spent on day of encounter reviewing medical records, history and physical examination, review of previous testing and interpretation, documentation and further activities as noted above

## 2024-01-05 NOTE — TELEPHONE ENCOUNTER
"Requested Prescriptions   Pending Prescriptions Disp Refills    insulin pen needle (B-D U/F) 31G X 5 MM miscellaneous [Pharmacy Med Name: BD PEN NEEDLE MINI  31G X 5 MM MISC]  0     Sig: USE 1 PEN NEEDLE DAILY OR AS DIRECTED.       Diabetic Supplies Protocol Passed - 1/5/2024 10:38 AM        Passed - Medication is active on med list        Passed - Patient is 18 years of age or older        Passed - Recent (6 mo) or future (30 days) visit within the authorizing provider's specialty     Patient had office visit in the last 6 months or has a visit in the next 30 days with authorizing provider.  See \"Patient Info\" tab in inbasket, or \"Choose Columns\" in Meds & Orders section of the refill encounter.                 "

## 2024-01-05 NOTE — NURSING NOTE
"Chief Complaint   Patient presents with    Sleep Problem     CPAP supplies       Initial /67   Pulse 101   Wt 127.5 kg (281 lb)   SpO2 94%   BMI 38.10 kg/m   Estimated body mass index is 38.1 kg/m  as calculated from the following:    Height as of 12/12/23: 1.829 m (6' 0.01\").    Weight as of this encounter: 127.5 kg (281 lb).    Medication Reconciliation: complete    Neck circumference: 20 inches / 51 centimeters.    DME: Buying online at [employer].    ESS 4    ROSY 1    Leo Byers CMA ([facility])  "

## 2024-01-08 ENCOUNTER — TELEPHONE (OUTPATIENT)
Dept: SLEEP MEDICINE | Facility: CLINIC | Age: 69
End: 2024-01-08
Payer: COMMERCIAL

## 2024-01-08 DIAGNOSIS — L71.9 ROSACEA: ICD-10-CM

## 2024-01-08 DIAGNOSIS — E11.21 TYPE 2 DIABETES MELLITUS WITH DIABETIC NEPHROPATHY, WITHOUT LONG-TERM CURRENT USE OF INSULIN (H): ICD-10-CM

## 2024-01-08 RX ORDER — EMPAGLIFLOZIN 25 MG/1
25 TABLET, FILM COATED ORAL DAILY
Qty: 30 TABLET | Refills: 0 | Status: SHIPPED | OUTPATIENT
Start: 2024-01-08 | End: 2024-01-22

## 2024-01-09 RX ORDER — DOXYCYCLINE 100 MG/1
100 CAPSULE ORAL 2 TIMES DAILY
Qty: 180 CAPSULE | Refills: 0 | Status: SHIPPED | OUTPATIENT
Start: 2024-01-09

## 2024-01-09 NOTE — TELEPHONE ENCOUNTER
Medication refilled for 3 months, please advise patient that we need to give medication break once symptoms are controlled, advised patient to use for 3 months and then off for few months.

## 2024-01-09 NOTE — TELEPHONE ENCOUNTER
Patient calls back and states that the Doxycycline is being used for   Rosacea [L71.9]      Last refill was done by [facility].  [facility] chose not to go back to [facility] and would like this filled by Dr. Murray.

## 2024-01-11 DIAGNOSIS — E11.65 TYPE 2 DIABETES MELLITUS WITH HYPERGLYCEMIA, WITHOUT LONG-TERM CURRENT USE OF INSULIN (H): ICD-10-CM

## 2024-01-11 NOTE — TELEPHONE ENCOUNTER
6 month supply sent 8/3/23 pt should have enough till 2/3/24. Sent 1 month suppply in to cover after 2/3/24

## 2024-01-20 DIAGNOSIS — E11.21 TYPE 2 DIABETES MELLITUS WITH DIABETIC NEPHROPATHY, WITHOUT LONG-TERM CURRENT USE OF INSULIN (H): ICD-10-CM

## 2024-01-20 DIAGNOSIS — E11.65 TYPE 2 DIABETES MELLITUS WITH HYPERGLYCEMIA, WITHOUT LONG-TERM CURRENT USE OF INSULIN (H): ICD-10-CM

## 2024-01-20 DIAGNOSIS — E78.5 HYPERLIPIDEMIA LDL GOAL <70: ICD-10-CM

## 2024-01-22 RX ORDER — EMPAGLIFLOZIN 25 MG/1
25 TABLET, FILM COATED ORAL DAILY
Qty: 30 TABLET | Refills: 0 | Status: SHIPPED | OUTPATIENT
Start: 2024-01-22 | End: 2024-03-06

## 2024-01-22 RX ORDER — PIOGLITAZONEHYDROCHLORIDE 45 MG/1
45 TABLET ORAL DAILY
Qty: 30 TABLET | Refills: 0 | Status: SHIPPED | OUTPATIENT
Start: 2024-01-22 | End: 2024-03-19

## 2024-01-22 RX ORDER — GLIPIZIDE 10 MG/1
20 TABLET, FILM COATED, EXTENDED RELEASE ORAL DAILY
Qty: 60 TABLET | Refills: 0 | Status: SHIPPED | OUTPATIENT
Start: 2024-01-22 | End: 2024-03-06

## 2024-01-22 NOTE — TELEPHONE ENCOUNTER
Requested Prescriptions   Pending Prescriptions Disp Refills    glipiZIDE (GLUCOTROL XL) 10 MG 24 hr tablet [Pharmacy Med Name: GLIPIZIDE ER 10MG TB24] 180 tablet 1     Sig: TAKE 2 TABLETS (20 MG) BY MOUTH DAILY       Sulfonylurea Agents Failed - 1/20/2024  3:38 PM        Failed - Patient has documented A1c within the specified period of time.     If HgbA1C is 8 or greater, it needs to be on file within the past 3 months.  If less than 8, must be on file within the past 6 months.     Recent Labs   Lab Test 09/11/23  0930   A1C 9.3*             Failed - Has GFR on file in past 12 months and most recent value is normal        Failed - Patient has a recent creatinine (normal) within the past 12 mos.     Recent Labs   Lab Test 05/25/23  0932   CR 1.32*       Ok to refill medication if creatinine is low          Passed - Medication is active on med list        Passed - Recent (6 mo) or future (90 days) visit within the authorizing provider's specialty     The patient must have completed an in-person or virtual visit within the past 6 months or has a future visit scheduled within the next 90 days with the authorizing provider s specialty.  Urgent care and e-visits do not quality as an office visit for this protocol.          Passed - Medication indicated for associated diagnosis     Medication is associated with one or more of the following diagnoses:  Maturity-onset diabetes of the young   Peripheral circulatory disorder due to type 2 diabetes mellitus   Type 2 diabetes mellitus           Passed - Patient is age 18 or older          pioglitazone (ACTOS) 45 MG tablet [Pharmacy Med Name: PIOGLITAZONE HCL 45MG TABS] 90 tablet 1     Sig: TAKE ONE TABLET BY MOUTH ONCE DAILY       Thiazolidinedione Agents (TZDs)  Failed - 1/20/2024  3:38 PM        Failed - Patient has documented A1c within the specified period of time.     If HgbA1C is 8 or greater, it needs to be on file within the past 3 months.  If less than 8, must be on  file within the past 6 months.     Recent Labs   Lab Test 09/11/23  0930   A1C 9.3*             Failed - Has GFR on file in past 12 months and most recent value is normal        Failed - Patient has a normal serum Creatinine in the past 12 months     Recent Labs   Lab Test 05/25/23  0932   CR 1.32*       Ok to refill medication if creatinine is low          Passed - Patient has a normal ALT within the past 12 mos.     Recent Labs   Lab Test 05/25/23  0932   ALT 26             Passed - Patient has a normal AST within the past 12 mos.      Recent Labs   Lab Test 05/25/23  0932   AST 20             Passed - Diagnosis not CHF        Passed - Medication is active on med list        Passed - Recent (6 mo) or future (90 days) visit within the authorizing provider's specialty     The patient must have completed an in-person or virtual visit within the past 6 months or has a future visit scheduled within the next 90 days with the authorizing provider s specialty.  Urgent care and e-visits do not quality as an office visit for this protocol.          Passed - Medication indicated for associated diagnosis     Medication is associated with one or more of the following diagnoses:   - Type 2 diabetes mellitus          Passed - Patient is age 18 or older          JARDIANCE 25 MG TABS tablet [Pharmacy Med Name: JARDIANCE 25MG TABS] 30 tablet 0     Sig: TAKE ONE TABLET BY MOUTH ONCE DAILY       Sodium Glucose Co-Transport Inhibitor Agents Failed - 1/20/2024  3:38 PM        Failed - Patient has documented A1c within the specified period of time.     If HgbA1C is 8 or greater, it needs to be on file within the past 3 months.  If less than 8, must be on file within the past 6 months.     Recent Labs   Lab Test 09/11/23  0930   A1C 9.3*             Failed - Has GFR on file in past 12 months and most recent value is normal        Passed - Medication is active on med list        Passed - Recent (6 mo) or future (90 days) visit within the  authorizing provider's specialty     The patient must have completed an in-person or virtual visit within the past 6 months or has a future visit scheduled within the next 90 days with the authorizing provider s specialty.  Urgent care and e-visits do not quality as an office visit for this protocol.          Passed - Medication indicated for associated diagnosis     Medication is associated with one or more of the following diagnoses:     Diabetic nephropathy, With Albuminuria - Type 2 diabetes mellitus     Disorder of cardiovascular system; Prophylaxis - Type 2 diabetes mellitus     Type 2 diabetes mellitus    Disorder of cardiovascular system; Prophylaxis - Heart failure   Chronic kidney disease, (At risk of progression) to reduce the risk of sustained   estimated GFR decline, end-stage kidney disease, cardiovascular death,   and hospitalization for heart failure     Heart failure, (NYHA class II to IV, reduced ejection fraction) to reduce risk of  cardiovascular death and hospitalization           Passed - Patient is age 18 or older        Passed - Patient has documented normal Potassium within the last 12 mos.     Recent Labs   Lab Test 05/25/23  0932   POTASSIUM 4.8

## 2024-01-23 RX ORDER — SIMVASTATIN 20 MG
20 TABLET ORAL AT BEDTIME
Qty: 90 TABLET | Refills: 1 | Status: SHIPPED | OUTPATIENT
Start: 2024-01-23 | End: 2024-06-24

## 2024-02-01 ENCOUNTER — LAB (OUTPATIENT)
Dept: LAB | Facility: CLINIC | Age: 69
End: 2024-02-01
Payer: COMMERCIAL

## 2024-02-01 DIAGNOSIS — E11.65 TYPE 2 DIABETES MELLITUS WITH HYPERGLYCEMIA, WITHOUT LONG-TERM CURRENT USE OF INSULIN (H): ICD-10-CM

## 2024-02-01 LAB — HBA1C MFR BLD: 8.5 % (ref 0–5.6)

## 2024-02-01 PROCEDURE — 83036 HEMOGLOBIN GLYCOSYLATED A1C: CPT

## 2024-02-01 PROCEDURE — 36415 COLL VENOUS BLD VENIPUNCTURE: CPT

## 2024-02-07 ENCOUNTER — OFFICE VISIT (OUTPATIENT)
Dept: ENDOCRINOLOGY | Facility: CLINIC | Age: 69
End: 2024-02-07
Payer: COMMERCIAL

## 2024-02-07 VITALS
HEIGHT: 72 IN | RESPIRATION RATE: 18 BRPM | TEMPERATURE: 97.8 F | WEIGHT: 280.7 LBS | OXYGEN SATURATION: 100 % | DIASTOLIC BLOOD PRESSURE: 74 MMHG | HEART RATE: 95 BPM | BODY MASS INDEX: 38.02 KG/M2 | SYSTOLIC BLOOD PRESSURE: 131 MMHG

## 2024-02-07 DIAGNOSIS — E78.5 HYPERLIPIDEMIA LDL GOAL <70: ICD-10-CM

## 2024-02-07 DIAGNOSIS — I10 ESSENTIAL HYPERTENSION WITH GOAL BLOOD PRESSURE LESS THAN 140/90: ICD-10-CM

## 2024-02-07 DIAGNOSIS — E11.21 TYPE 2 DIABETES MELLITUS WITH DIABETIC NEPHROPATHY, WITHOUT LONG-TERM CURRENT USE OF INSULIN (H): Primary | ICD-10-CM

## 2024-02-07 PROCEDURE — 95251 CONT GLUC MNTR ANALYSIS I&R: CPT | Performed by: INTERNAL MEDICINE

## 2024-02-07 PROCEDURE — G2211 COMPLEX E/M VISIT ADD ON: HCPCS | Performed by: INTERNAL MEDICINE

## 2024-02-07 PROCEDURE — 99214 OFFICE O/P EST MOD 30 MIN: CPT | Performed by: INTERNAL MEDICINE

## 2024-02-07 RX ORDER — BLOOD-GLUCOSE METER
KIT MISCELLANEOUS
Qty: 100 STRIP | Refills: 1 | Status: SHIPPED | OUTPATIENT
Start: 2024-02-07 | End: 2024-06-24

## 2024-02-07 RX ORDER — SEMAGLUTIDE 0.68 MG/ML
INJECTION, SOLUTION SUBCUTANEOUS
COMMUNITY
Start: 2024-01-22 | End: 2024-02-15

## 2024-02-07 NOTE — LETTER
2/7/2024         RE: Francois Fontana  [address] [facility] [address]        Dear Colleague,    Thank you for referring your patient, Francois Fontana, to the [facility]. Please see a copy of my visit note below.    ENDOCRINOLOGY CLINIC NOTE:  Name: Francois Fontana  Seen for f/u of  Diabetes.  HPI:  Francois Fontana is a 68 year old male who presents for the evaluation/management of Diabetes.   has a past medical history of Bilateral incipient cataracts (08/14/2020), Diabetes mellitus, type 2 (H), Hyperlipidemia, Rosacea, Stenosis of left subclavian artery (H24), Type II or unspecified type diabetes mellitus without mention of complication, not stated as uncontrolled, and Unspecified essential hypertension.   Was seen by  and Sherri Mayer before.    He is not checking BG.  He is not going to gym.  He had shoulder surgery for rotator cuff injury. It is now better.  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    He is resistance to take insulin.  Victoza is not covered by insurance but wants to finish the supplies and then start Ozepmpic ( he has it at home)    Wt Readings from Last 2 Encounters:   02/07/24 127.3 kg (280 lb 11.2 oz)   01/05/24 127.5 kg (281 lb)       1. Type 2 DM:  Orginally diagnosed at the age of: Original diagnosis in 1990.   Current Regimen:   Metformin 1000 mg twice a day  Jardiance 25 mg once a day  Glipizide XL 10 mg twice a day  Actos 45 mg once a day  Victoza 1.8 mg once a day    yes:     Diabetes Medication(s)       Biguanides       metFORMIN (GLUCOPHAGE) 1000 MG tablet TAKE ONE TABLET BY MOUTH TWICE A DAY WITH MEALS       Sodium-Glucose Co-Transporter 2 (SGLT2) Inhibitors       JARDIANCE 25 MG TABS tablet TAKE ONE TABLET BY MOUTH ONCE DAILY       Sulfonylureas       glipiZIDE (GLUCOTROL XL) 10 MG 24 hr tablet TAKE 2 TABLETS (20 MG) BY MOUTH DAILY       Insulin Sensitizing Agents       pioglitazone (ACTOS) 45 MG tablet TAKE  ONE TABLET BY MOUTH ONCE DAILY       Incretin Mimetic Agents       VICTOZA PEN 18 MG/3ML soln INJECT 1.8 MG UNDER THE SKIN DAILY     OZEMPIC, 0.25 OR 0.5 MG/DOSE, 2 MG/3ML pen      Patient not taking: Reported on 2024            BS checks: not checking BG- it hurts  Average Meter Download: Patient is not checking BG. Without Blood sugar data it is difficult to make adjustment to medical regimen.     Exercise: Limited  Last A1c: 8.5%  Symptoms of hypoglycemia (low blood sugar):  Gets symptoms of hypoglycemia.  Episodes of hypoglycemia: Rare    DM Complications:   Complications:   Diabetes Complications  Description / Detail    Diabetic Retinopathy  No   CAD / PAD  No   Neuropathy  No   Nephropathy / Microalbuminuria     + microalbuminuria--on valsartan        Gastroparesis  No   Hypoglycemia [place]  No       2. Hypertension:    on medications  3. Hyperlipidemia: on medications    PMH/PSH:  Past Medical History:   Diagnosis Date     Bilateral incipient cataracts 2020     Diabetes mellitus, type 2 (H)      Hyperlipidemia      Rosacea      Stenosis of left subclavian artery (H24)      Type II or unspecified type diabetes mellitus without mention of complication, not stated as uncontrolled     Abstracted 02     Unspecified essential hypertension      Past Surgical History:   Procedure Laterality Date     HERNIA REPAIR, INGUINAL RT/LT      right     Family Hx:  Family History   Problem Relation Age of Onset     Cancer Father          of lung cancer     C.A.D. Father         Mult bypass operations     Diabetes Type 2  Father      Prostate Cancer Father      Respiratory Mother          of COPD     Gastrointestinal Disease Brother         Hemochromatosis     Gastrointestinal Disease Brother         Hemochromatosis     Gastrointestinal Disease Brother         Hemochromatosis ( in 3 bros)       Diabetes: Father    Social Hx:  Social History     Socioeconomic History     Marital status:       Spouse name: Not on file     Number of children: Not on file     Years of education: Not on file     Highest education level: Not on file   Occupational History     Not on file   Tobacco Use     Smoking status: Former     Types: Cigarettes     Quit date: 1985     Years since quittin.5     Smokeless tobacco: Never     Tobacco comments:     quit    Vaping Use     Vaping Use: Never used   Substance and Sexual Activity     Alcohol use: No     Alcohol/week: 0.0 standard drinks of alcohol     Comment: quit drinking age 30     Drug use: No     Sexual activity: Yes     Partners: Female   Other Topics Concern     Parent/sibling w/ CABG, MI or angioplasty before 65F 55M? Yes   Social History Narrative     Not on file     Social Determinants of Health     Financial Resource Strain: Low Risk  (2023)    Financial Resource Strain      Within the past 12 months, have you or your family members you live with been unable to get utilities (heat, electricity) when it was really needed?: No   Food Insecurity: Low Risk  (2023)    Food Insecurity      Within the past 12 months, did you worry that your food would run out before you got money to buy more?: No      Within the past 12 months, did the food you bought just not last and you didn t have money to get more?: No   Transportation Needs: Low Risk  (2023)    Transportation Needs      Within the past 12 months, has lack of transportation kept you from medical appointments, getting your medicines, non-medical meetings or appointments, work, or from getting things that you need?: No   Physical Activity: Not on file   Stress: Not on file   Social Connections: Not on file   Interpersonal Safety: Low Risk  (2023)    Interpersonal Safety      Do you feel physically and emotionally safe where you currently live?: Yes      Within the past 12 months, have you been hit, slapped, kicked or otherwise physically hurt by someone?: No      Within the past 12  "months, have you been humiliated or emotionally abused in other ways by your partner or ex-partner?: No   Housing Stability: Low Risk  (12/12/2023)    Housing Stability      Do you have housing? : Yes      Are you worried about losing your housing?: No          MEDICATIONS:  has a current medication list which includes the following prescription(s): accu-chek guide, alcohol swab, amlodipine, aspirin, blood glucose calibration, blood glucose monitoring, blood glucose monitoring, freestyle laurel 2 reader, freestyle laurel 2 sensor, doxycycline hyclate, glipizide, hydrochlorothiazide, b-d u/f, jardiance, metformin, order for dme, pioglitazone, simvastatin, valsartan, victoza pen, and ozempic (0.25 or 0.5 mg/dose).    ROS     ROS: 10 point ROS neg other than the symptoms noted above in the HPI.    Physical Exam   VS: /74 (BP Location: Left arm, Patient Position: Chair, Cuff Size: Adult Large)   Pulse 95   Temp 97.8  F (36.6  C) (Tympanic)   Resp 18   Ht 1.829 m (6' 0.01\")   Wt 127.3 kg (280 lb 11.2 oz)   SpO2 100%   BMI 38.06 kg/m    GENERAL: healthy, alert and no distress  EYES: Eyes grossly normal to inspection, conjunctivae and sclerae normal  ENT: no nose swelling, nasal discharge.  Thyroid: no apparent thyroid nodules.  Thyroid appears normal in size and nontender.  CV: RRR, no rubs, gallops, no murmurs  RESP: CTAB, no wheezes, rales, or ronchi  ABDO: +BS  EXTREMITIES: no hand tremors.  NEURO: Cranial nerves grossly intact, mentation intact and speech normal  SKIN: No apparent skin lesions, rash or edema seen   PSYCH: mentation appears normal, affect normal/bright, judgement and insight intact, normal speech and appearance well-groomed      LABS:  A1c:  Lab Results   Component Value Date    A1C 8.5 02/01/2024    A1C 9.3 09/11/2023    A1C 10.1 05/25/2023    A1C 10.0 02/27/2023    A1C 8.5 09/13/2022    A1C 7.4 10/26/2021    A1C 8.6 06/10/2021    A1C 10.4 05/19/2021    A1C 12.0 04/29/2021    A1C 13.1 " 04/08/2021       Creatinine:  Creatinine   Date Value Ref Range Status   05/25/2023 1.32 (H) 0.67 - 1.17 mg/dL Final   09/13/2022 1.34 (A) 0.60 - 1.30 mg/dL Final     Comment:     ran twice        Urine Micro:  Lab Results   Component Value Date    UMALCR 114.75 05/25/2023    UMALCR 68.50 06/28/2021       LFTs/Lipids:  Recent Labs   Lab Test 05/25/23  0932 10/26/21  1044 09/15/16  1043 11/17/15  1006   CHOL 122 102   < > 149   HDL 38* 43   < > 40*   LDL 43 32   < > 70   TRIG 205* 137   < > 196*   CHOLHDLRATIO  --   --   --  3.7    < > = values in this interval not displayed.       All pertinent notes, labs, and images personally reviewed by me.     Glucometer/ insulin pump (if applicable)/ CGM data (if applicable) downloaded, Personally reviewed and interpreted.  All Blood sugar data reviewed personally and discussed with pt.      A/P  Mr.Raymond COLETTE Fontana is a 66 year old here for the evaluation/management of diabetes:    1. DM2 - Under Fair control.  A1c 10.1%.  Diabetes complicated by microalbuminuria.  Plan:  Discussed diagnosis, pathophysiology, management and treatment options of condition with pt.  I also discussed importance of strict blood sugar control to prevent complications associated with uncontrolled diabetes.  I discussed with patient that A1c is high and need to consider insulin but he is resistant to that idea.  He is working as a  and would like to avoid insulin.  Continue current regimen for diabetes.  Metformin 1000 mg twice a day  Jardiance 25 mg once a day  Glipizide XL 10 mg twice a day  Actos 45 mg once a day  Victoza 1.8 mg once a day  Once done with Victoza then switch to Ozempic.  (He already has Ozempic at home)Start Ozempic after you are done Victoza.    Continue to use laurel 2- scan often.  Check if laurel 3 is covered and inform us if you need Rx.    Start Ozempic after you are done Victoza.  It is once a week.  The beginning dose id 0.25 mg for first 4 weeks  On week 5 increase  the dose to 0.5 mg  Follow up 4 weeks after that.  ( Can see Ana Maria Tovar at that time)    2. Hypertension - Under Good control. On hydrochlorothiazide 25 mg, amlodipine 10 mg and valsartan 160 mg.  + microabuminuria: on Valsartan.    3. Hyperlipidemia - Under Good control. On simvastatin 20 mg. LDL 43.    4. Prevention:  Opthalmology- no  Recommend annually.  New referral in place  ASA- 81 mg/day  Smoking- former smoker    Foot Exam: Yes: 05/15/23     Most Recent Immunizations   Administered Date(s) Administered     COVID-19 Bivalent 12+ (Pfizer) 09/28/2022     COVID-19 MONOVALENT 12+ (Pfizer) 10/30/2021     COVID-19 Monovalent 12+ (Pfizer 2022) 05/06/2022     Flu, Unspecified 10/05/2020     Influenza (IIV3) PF 08/23/2014     Influenza (intradermal) 10/29/2018     Influenza Vaccine 18-64 (Flublok) 10/05/2020     Influenza Vaccine 65+ (Fluzone HD) 10/02/2023     Influenza Vaccine >6 months,quad, PF 09/15/2016     Pneumococcal 20 valent Conjugate (Prevnar 20) 05/15/2023     Pneumococcal 23 valent 11/02/2006     TDAP (Adacel,Boostrix) 10/20/2008     TDAP Vaccine (Adacel) 10/20/2008     Td (Adult), Adsorbed 03/13/2019     Zoster recombinant adjuvanted (SHINGRIX) 12/12/2023       Recommend checking blood sugars before meals and at bedtime.    If Blood glucose are low more often-> 2-3 times/week- give us a call.  The patient is advised to Make better food choices: reduce carbs, Reduce portion size, weight loss and exercise 3-4 times a week.  Discussed hypoglycemia signs and symptoms as well as management in detail.    There is some variability among people, most will usually develop symptoms suggestive of hypoglycemia when blood glucose levels are lowered to the mid 60's. The first set of symptoms are called adrenergic. Patients may experience any of the following nervousness, sweating, intense hunger, trembling, weakness, palpitations, and difficulty speaking.   The acute management of hypoglycemia involves the rapid  delivery of a source of easily absorbed sugar. Regular soda, juice, lifesavers, table sugar, are good options. 15 grams of glucose is the dose that is given, followed by an assessment of symptoms and a blood glucose check if possible. If after 10 minutes there is no improvement, another 10-15 grams should be given. This can be repeated up to three times. The equivalency of 10-15 grams of glucose (approximate servings) are: Four lifesavers, 4 teaspoons of sugar, or 1/2 can of regular soda or juice.     Discussed indications, risks and benefits of all medications prescribed, and answered questions to patient's satisfaction.   The longitudinal plan of care for the condition(s) below were addressed during this visit. Due to the added complexity in care, I will continue to support Ray in the subsequent management of this condition(s) and with the ongoing continuity of care of this condition(s).    Problem List Items Addressed This Visit as of 2/7/2024         Endocrine Diagnoses    Hyperlipidemia LDL goal <70    Type 2 diabetes mellitus with diabetic nephropathy (H) - Primary       Other    Essential hypertension with goal blood pressure less than 140/90        All questions were answered.  The patient indicates understanding of the above issues and agrees with the plan set forth.     Follow-up:  6-7 months.    Maryam Doshi M.D  Endocrinology  [facility]  CC: Jagruti Figueredo    Disclaimer: This note consists of symbols derived from keyboarding, dictation and/or voice recognition software. As a result, there may be errors in the script that have gone undetected. Please consider this when interpreting information found in this chart.    Addendum to above note and clinic visit:    Labs reviewed.    See result note/telephone encounter.        Again, thank you for allowing me to participate in the care of your patient.        Sincerely,        Maryam Doshi MD

## 2024-02-07 NOTE — NURSING NOTE
"ENDOCRINOLOGY INTAKE FORM    Patient Name:  Francois Fontana  :  1955    Is patient Diabetic?   Yes: type 2  Does patient have non-diabetic or other endocrine issues?  Yes:    Morbid obesity (H)  Hyperlipidemia LDL goal <70       Vitals: There were no vitals taken for this visit.  BMI= There is no height or weight on file to calculate BMI.    Flu vaccine:  Yes:   Pneumonia vaccine:  Yes: 23 x 1, 20 x 1    Smoking and Alcohol use:  Social History     Tobacco Use    Smoking status: Former     Types: Cigarettes     Quit date: 1985     Years since quittin.5    Smokeless tobacco: Never    Tobacco comments:     quit    Vaping Use    Vaping Use: Never used   Substance Use Topics    Alcohol use: No     Alcohol/week: 0.0 standard drinks of alcohol     Comment: quit drinking age 30    Drug use: No       Foot Exam: Yes: 05/15/23  Eye Exam:  Yes: 23  Dental Exam:    Aspirin Use:  Yes: 81 mg    Lab Results   Component Value Date    A1C 8.5 2024    A1C 9.3 2023    A1C 10.1 2023    A1C 10.0 2023    A1C 8.5 2022    A1C 7.4 10/26/2021    A1C 8.6 06/10/2021    A1C 10.4 2021    A1C 12.0 2021    A1C 13.1 2021       Lab Results   Component Value Date    MICROL 74.7 2023    MICROL 91 2021     No results found for: \"MICROALBUMIN\"    Kimberlee Poole [facility] Endocrinology [place]  [phone]    "

## 2024-02-07 NOTE — PROGRESS NOTES
ENDOCRINOLOGY CLINIC NOTE:  Name: Francois Fontana  Seen for f/u of  Diabetes.  HPI:  Francois Fontana is a 68 year old male who presents for the evaluation/management of Diabetes.   has a past medical history of Bilateral incipient cataracts (08/14/2020), Diabetes mellitus, type 2 (H), Hyperlipidemia, Rosacea, Stenosis of left subclavian artery (H24), Type II or unspecified type diabetes mellitus without mention of complication, not stated as uncontrolled, and Unspecified essential hypertension.   Was seen by  and Sherri Mayer before.    He is not checking BG.  He is not going to gym.  He had shoulder surgery for rotator cuff injury. It is now better.  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    He is resistance to take insulin.  Victoza is not covered by insurance but wants to finish the supplies and then start Ozepmpic ( he has it at home)    Wt Readings from Last 2 Encounters:   02/07/24 127.3 kg (280 lb 11.2 oz)   01/05/24 127.5 kg (281 lb)       1. Type 2 DM:  Orginally diagnosed at the age of: Original diagnosis in 1990.   Current Regimen:   Metformin 1000 mg twice a day  Jardiance 25 mg once a day  Glipizide XL 10 mg twice a day  Actos 45 mg once a day  Victoza 1.8 mg once a day    yes:     Diabetes Medication(s)       Biguanides       metFORMIN (GLUCOPHAGE) 1000 MG tablet TAKE ONE TABLET BY MOUTH TWICE A DAY WITH MEALS       Sodium-Glucose Co-Transporter 2 (SGLT2) Inhibitors       JARDIANCE 25 MG TABS tablet TAKE ONE TABLET BY MOUTH ONCE DAILY       Sulfonylureas       glipiZIDE (GLUCOTROL XL) 10 MG 24 hr tablet TAKE 2 TABLETS (20 MG) BY MOUTH DAILY       Insulin Sensitizing Agents       pioglitazone (ACTOS) 45 MG tablet TAKE ONE TABLET BY MOUTH ONCE DAILY       Incretin Mimetic Agents       VICTOZA PEN 18 MG/3ML soln INJECT 1.8 MG UNDER THE SKIN DAILY     OZEMPIC, 0.25 OR 0.5 MG/DOSE, 2 MG/3ML pen      Patient not taking: Reported on 2/7/2024            BS checks: not checking BG-  it hurts  Average Meter Download: Patient is not checking BG. Without Blood sugar data it is difficult to make adjustment to medical regimen.     Exercise: Limited  Last A1c: 8.5%  Symptoms of hypoglycemia (low blood sugar):  Gets symptoms of hypoglycemia.  Episodes of hypoglycemia: Rare    DM Complications:   Complications:   Diabetes Complications  Description / Detail    Diabetic Retinopathy  No   CAD / PAD  No   Neuropathy  No   Nephropathy / Microalbuminuria     + microalbuminuria--on valsartan        Gastroparesis  No   Hypoglycemia [place]  No       2. Hypertension:    on medications  3. Hyperlipidemia: on medications    PMH/PSH:  Past Medical History:   Diagnosis Date    Bilateral incipient cataracts 2020    Diabetes mellitus, type 2 (H)     Hyperlipidemia     Rosacea     Stenosis of left subclavian artery (H24)     Type II or unspecified type diabetes mellitus without mention of complication, not stated as uncontrolled     Abstracted 02    Unspecified essential hypertension      Past Surgical History:   Procedure Laterality Date    HERNIA REPAIR, INGUINAL RT/LT      right     Family Hx:  Family History   Problem Relation Age of Onset    Cancer Father          of lung cancer    C.A.D. Father         Mult bypass operations    Diabetes Type 2  Father     Prostate Cancer Father     Respiratory Mother          of COPD    Gastrointestinal Disease Brother         Hemochromatosis    Gastrointestinal Disease Brother         Hemochromatosis    Gastrointestinal Disease Brother         Hemochromatosis ( in 3 bros)       Diabetes: Father    Social Hx:  Social History     Socioeconomic History    Marital status:      Spouse name: Not on file    Number of children: Not on file    Years of education: Not on file    Highest education level: Not on file   Occupational History    Not on file   Tobacco Use    Smoking status: Former     Types: Cigarettes     Quit date: 1985     Years since  quittin.5    Smokeless tobacco: Never    Tobacco comments:     quit    Vaping Use    Vaping Use: Never used   Substance and Sexual Activity    Alcohol use: No     Alcohol/week: 0.0 standard drinks of alcohol     Comment: quit drinking age 30    Drug use: No    Sexual activity: Yes     Partners: Female   Other Topics Concern    Parent/sibling w/ CABG, MI or angioplasty before 65F 55M? Yes   Social History Narrative    Not on file     Social Determinants of Health     Financial Resource Strain: Low Risk  (2023)    Financial Resource Strain     Within the past 12 months, have you or your family members you live with been unable to get utilities (heat, electricity) when it was really needed?: No   Food Insecurity: Low Risk  (2023)    Food Insecurity     Within the past 12 months, did you worry that your food would run out before you got money to buy more?: No     Within the past 12 months, did the food you bought just not last and you didn t have money to get more?: No   Transportation Needs: Low Risk  (2023)    Transportation Needs     Within the past 12 months, has lack of transportation kept you from medical appointments, getting your medicines, non-medical meetings or appointments, work, or from getting things that you need?: No   Physical Activity: Not on file   Stress: Not on file   Social Connections: Not on file   Interpersonal Safety: Low Risk  (2023)    Interpersonal Safety     Do you feel physically and emotionally safe where you currently live?: Yes     Within the past 12 months, have you been hit, slapped, kicked or otherwise physically hurt by someone?: No     Within the past 12 months, have you been humiliated or emotionally abused in other ways by your partner or ex-partner?: No   Housing Stability: Low Risk  (2023)    Housing Stability     Do you have housing? : Yes     Are you worried about losing your housing?: No          MEDICATIONS:  has a current medication  "list which includes the following prescription(s): accu-chek guide, alcohol swab, amlodipine, aspirin, blood glucose calibration, blood glucose monitoring, blood glucose monitoring, freestyle laurel 2 reader, freestyle laurel 2 sensor, doxycycline hyclate, glipizide, hydrochlorothiazide, b-d u/f, jardiance, metformin, order for dme, pioglitazone, simvastatin, valsartan, victoza pen, and ozempic (0.25 or 0.5 mg/dose).    ROS     ROS: 10 point ROS neg other than the symptoms noted above in the HPI.    Physical Exam   VS: /74 (BP Location: Left arm, Patient Position: Chair, Cuff Size: Adult Large)   Pulse 95   Temp 97.8  F (36.6  C) (Tympanic)   Resp 18   Ht 1.829 m (6' 0.01\")   Wt 127.3 kg (280 lb 11.2 oz)   SpO2 100%   BMI 38.06 kg/m    GENERAL: healthy, alert and no distress  EYES: Eyes grossly normal to inspection, conjunctivae and sclerae normal  ENT: no nose swelling, nasal discharge.  Thyroid: no apparent thyroid nodules.  Thyroid appears normal in size and nontender.  CV: RRR, no rubs, gallops, no murmurs  RESP: CTAB, no wheezes, rales, or ronchi  ABDO: +BS  EXTREMITIES: no hand tremors.  NEURO: Cranial nerves grossly intact, mentation intact and speech normal  SKIN: No apparent skin lesions, rash or edema seen   PSYCH: mentation appears normal, affect normal/bright, judgement and insight intact, normal speech and appearance well-groomed      LABS:  A1c:  Lab Results   Component Value Date    A1C 8.5 02/01/2024    A1C 9.3 09/11/2023    A1C 10.1 05/25/2023    A1C 10.0 02/27/2023    A1C 8.5 09/13/2022    A1C 7.4 10/26/2021    A1C 8.6 06/10/2021    A1C 10.4 05/19/2021    A1C 12.0 04/29/2021    A1C 13.1 04/08/2021       Creatinine:  Creatinine   Date Value Ref Range Status   05/25/2023 1.32 (H) 0.67 - 1.17 mg/dL Final   09/13/2022 1.34 (A) 0.60 - 1.30 mg/dL Final     Comment:     ran twice        Urine Micro:  Lab Results   Component Value Date    UMALCR 114.75 05/25/2023    UMALCR 68.50 06/28/2021     "   LFTs/Lipids:  Recent Labs   Lab Test 05/25/23  0932 10/26/21  1044 09/15/16  1043 11/17/15  1006   CHOL 122 102   < > 149   HDL 38* 43   < > 40*   LDL 43 32   < > 70   TRIG 205* 137   < > 196*   CHOLHDLRATIO  --   --   --  3.7    < > = values in this interval not displayed.       All pertinent notes, labs, and images personally reviewed by me.     Glucometer/ insulin pump (if applicable)/ CGM data (if applicable) downloaded, Personally reviewed and interpreted.  All Blood sugar data reviewed personally and discussed with pt.      A/P  Mr.Raymond COLETTE Fontana is a 66 year old here for the evaluation/management of diabetes:    1. DM2 - Under Fair control.  A1c 10.1%.  Diabetes complicated by microalbuminuria.  Plan:  Discussed diagnosis, pathophysiology, management and treatment options of condition with pt.  I also discussed importance of strict blood sugar control to prevent complications associated with uncontrolled diabetes.  I discussed with patient that A1c is high and need to consider insulin but he is resistant to that idea.  He is working as a  and would like to avoid insulin.  Continue current regimen for diabetes.  Metformin 1000 mg twice a day  Jardiance 25 mg once a day  Glipizide XL 10 mg twice a day  Actos 45 mg once a day  Victoza 1.8 mg once a day  Once done with Victoza then switch to Ozempic.  (He already has Ozempic at home)Start Ozempic after you are done Victoza.    Continue to use laurel 2- scan often.  Check if laurel 3 is covered and inform us if you need Rx.    Start Ozempic after you are done Victoza.  It is once a week.  The beginning dose id 0.25 mg for first 4 weeks  On week 5 increase the dose to 0.5 mg  Follow up 4 weeks after that.  ( Can see Ana Maria Tovar at that time)    2. Hypertension - Under Good control. On hydrochlorothiazide 25 mg, amlodipine 10 mg and valsartan 160 mg.  + microabuminuria: on Valsartan.    3. Hyperlipidemia - Under Good control. On simvastatin 20 mg. LDL  43.    4. Prevention:  Opthalmology- no  Recommend annually.  New referral in place  ASA- 81 mg/day  Smoking- former smoker    Foot Exam: Yes: 05/15/23     Most Recent Immunizations   Administered Date(s) Administered    COVID-19 Bivalent 12+ (Pfizer) 09/28/2022    COVID-19 MONOVALENT 12+ (Pfizer) 10/30/2021    COVID-19 Monovalent 12+ (Pfizer 2022) 05/06/2022    Flu, Unspecified 10/05/2020    Influenza (IIV3) PF 08/23/2014    Influenza (intradermal) 10/29/2018    Influenza Vaccine 18-64 (Flublok) 10/05/2020    Influenza Vaccine 65+ (Fluzone HD) 10/02/2023    Influenza Vaccine >6 months,quad, PF 09/15/2016    Pneumococcal 20 valent Conjugate (Prevnar 20) 05/15/2023    Pneumococcal 23 valent 11/02/2006    TDAP (Adacel,Boostrix) 10/20/2008    TDAP Vaccine (Adacel) 10/20/2008    Td (Adult), Adsorbed 03/13/2019    Zoster recombinant adjuvanted (SHINGRIX) 12/12/2023       Recommend checking blood sugars before meals and at bedtime.    If Blood glucose are low more often-> 2-3 times/week- give us a call.  The patient is advised to Make better food choices: reduce carbs, Reduce portion size, weight loss and exercise 3-4 times a week.  Discussed hypoglycemia signs and symptoms as well as management in detail.    There is some variability among people, most will usually develop symptoms suggestive of hypoglycemia when blood glucose levels are lowered to the mid 60's. The first set of symptoms are called adrenergic. Patients may experience any of the following nervousness, sweating, intense hunger, trembling, weakness, palpitations, and difficulty speaking.   The acute management of hypoglycemia involves the rapid delivery of a source of easily absorbed sugar. Regular soda, juice, lifesavers, table sugar, are good options. 15 grams of glucose is the dose that is given, followed by an assessment of symptoms and a blood glucose check if possible. If after 10 minutes there is no improvement, another 10-15 grams should be  given. This can be repeated up to three times. The equivalency of 10-15 grams of glucose (approximate servings) are: Four lifesavers, 4 teaspoons of sugar, or 1/2 can of regular soda or juice.     Discussed indications, risks and benefits of all medications prescribed, and answered questions to patient's satisfaction.   The longitudinal plan of care for the condition(s) below were addressed during this visit. Due to the added complexity in care, I will continue to support Ray in the subsequent management of this condition(s) and with the ongoing continuity of care of this condition(s).    Problem List Items Addressed This Visit as of 2/7/2024         Endocrine Diagnoses    Hyperlipidemia LDL goal <70    Type 2 diabetes mellitus with diabetic nephropathy (H) - Primary       Other    Essential hypertension with goal blood pressure less than 140/90        All questions were answered.  The patient indicates understanding of the above issues and agrees with the plan set forth.     Follow-up:  6-7 months.    Maryam Doshi M.D  Endocrinology  [facility]  CC: Danna Montoya, Jagruti Reynoso    Disclaimer: This note consists of symbols derived from keyboarding, dictation and/or voice recognition software. As a result, there may be errors in the script that have gone undetected. Please consider this when interpreting information found in this chart.    Addendum to above note and clinic visit:    Labs reviewed.    See result note/telephone encounter.

## 2024-02-07 NOTE — PATIENT INSTRUCTIONS
[facility]  Dr Doshi, Endocrinology Department    [facility]   [address] [facility]  [place] [address]  Appointment [phone]  Fax: [phone]  [place]: Monday - Thursday      Please check the cost coverage and copay with insurance before recommended tests, services and medications (especially if new medications are prescribed).     If ordered, please get blood work done 1 week prior to your next appointment so they will be available to Dr. Doshi at your visit.    To provide the best diabetic care, please bring your blood glucose meter to each and every visit with your  Endocrinologist. Your blood glucose CGM/meter/insulin pump will be downloaded at every appointment.  Please arrive 15 minutes before your scheduled appointment. This will allow for your blood glucose CGM/meter/insulin pump  to be downloaded.  If you are wearing DEXCOM please bring  or sharing code from the Dexcom Clarity Katie so that it can be downloaded.  If you are using FREESTYLE LAUREL personal sensors please bring the reader.    Continue current regimen for diabetes.  Metformin 1000 mg twice a day  Jardiance 25 mg once a day  Glipizide XL 10 mg twice a day  Actos 45 mg once a day  Victoza 1.8 mg once a day  Once done with Victoza then switch to Ozempic.  ( As noted below)    Continue to use laurel 2- scan often.  Check if laurel 3 is covered and inform us if you need Rx.    Start Ozempic after you are done Victoza.  It is once a week.  The beginning dose id 0.25 mg for first 4 weeks  On week 5 increase the dose to 0.5 mg  Follow up 4 weeks after that.  ( Can see Ana Maria Tovar at that time)    Byetta/exenatide (twice a day) , Buydureon/Exenatide (once a week), Ozempic/Semglutide (once a week), Trulicity/Dulaglutide (once a week), Victoza/Liraglutide (once a day)- these are alternatives. Please check cost with insurance.    Possible side effect profile of these class of  medication includes: Nausea, diarrhea, gastrointestinal intolerance, abdominal pain, upper respiratory tract infection, headache, increased heart rate, drop in blood sugar, injection site reaction.  Rare side effects include pancreatitis, kidney problems.  It has been associated with thyroid cancer in animal models.-    Recommend checking blood sugars before meals and at bedtime.    If Blood glucose are low more often-> 2-3 times/week- give us a call.  Make better food choices: reduce carbs, Reduce portion size, weight loss and exercise 3-4 times a week.    What is hypoglycemia:  Hypoglycemia is when blood sugar levels become too low - below 70 m/dl.      What causes hypoglycemia?  - using too much insulin  -taking too many diabetes pills  -not eating enough, or skipping meals or snacks  -not eating enough carbohydrate with meals  -changing your exercise routine  -drinking alcohol in excess    It is also possible to have hypoglycemia even when you are carefully managing your blood sugar levels.    What does it feel like when blood sugars get too low?  You may feel:  - anxious  -confused  -dizzy  -hungry  -light-headed  -nervous  -shaky  -sleepy  -sweaty    You may have  -blurred or cloudy vision  -heart palpitations (heart skips a beat or races)  -tingling or numbness around the mouth and tongue  -tremors    What to do if you have symptoms of hypoglycmemia:  If you think your blood sugar is too low, check it with a glucose meter.  If its below 70 mg/dl, consume one of the following:  Fruit juice (1/2 cup)  Glucose tablets (15 grams)  Hard candy (5 to 7 pieces)  Honey or sugar (2 teaspoons)  Milk (1/2 cup)  Soft drink (non-diet, 1/2 cup)    Wait 15 minutes and check your blood glucose again.  IF it is still below 70 mg/dl, have another food item listed above. Wait another 15 minutes and repeat the blood glucose test.  Have a small meal or snack that contains some carbohydrate after your blood glucose rises above 70  mg/dl.    If you are at risk of hypoglycemia, always carry with you glucose tablets or one of the foods listed above.      To prevent Hypoglycemia:  Avoid situations that may cause hypoglycemia  Before making any change to your diet or exercise routine, discuss them with your healthcare provider  Keep a record of your blood glucose levels.  Include the time of day, diabetes medications, when you had your last meal or snack, and what you were doing at the time (e.g. Watching TV, gardening, jogging, etc).    Talk to your healthcare provider if your blood glucose levels are often low        Patient guide on hypoglycemia    http://[email or web address].org/Resources/[email or web address]-guide-diagnosis-[email or web address]-management-[email or web address]

## 2024-02-08 DIAGNOSIS — E11.65 TYPE 2 DIABETES MELLITUS WITH HYPERGLYCEMIA, WITHOUT LONG-TERM CURRENT USE OF INSULIN (H): ICD-10-CM

## 2024-02-08 RX ORDER — BLOOD SUGAR DIAGNOSTIC
STRIP MISCELLANEOUS
Qty: 100 STRIP | Refills: 0 | OUTPATIENT
Start: 2024-02-08

## 2024-02-08 NOTE — TELEPHONE ENCOUNTER
"    Requested Prescriptions   Pending Prescriptions Disp Refills    ACCU-CHEK GUIDE test strip [Pharmacy Med Name: ACCU-CHEK GUIDE  STRP] 100 strip 0     Sig: USE TO TEST BLOOD SUGAR ONCE DAILY       Diabetic Supplies Protocol Passed - 2/8/2024 12:45 PM        Passed - Medication is active on med list        Passed - Medication indicated for associated diagnosis        Passed - Patient is 18 years of age or older        Passed - Recent (6 mo) or future (30 days) visit within the authorizing provider's specialty     Patient had office visit in the last 6 months or has a visit in the next 30 days with authorizing provider.  See \"Patient Info\" tab in inbasket, or \"Choose Columns\" in Meds & Orders section of the refill encounter.                 "

## 2024-02-14 DIAGNOSIS — E11.65 TYPE 2 DIABETES MELLITUS WITH HYPERGLYCEMIA, WITHOUT LONG-TERM CURRENT USE OF INSULIN (H): Primary | ICD-10-CM

## 2024-02-15 RX ORDER — SEMAGLUTIDE 0.68 MG/ML
INJECTION, SOLUTION SUBCUTANEOUS
Qty: 3 ML | Refills: 1 | Status: SHIPPED | OUTPATIENT
Start: 2024-02-15 | End: 2024-06-17

## 2024-03-06 DIAGNOSIS — E11.21 TYPE 2 DIABETES MELLITUS WITH DIABETIC NEPHROPATHY, WITHOUT LONG-TERM CURRENT USE OF INSULIN (H): ICD-10-CM

## 2024-03-06 DIAGNOSIS — E11.65 TYPE 2 DIABETES MELLITUS WITH HYPERGLYCEMIA, WITHOUT LONG-TERM CURRENT USE OF INSULIN (H): ICD-10-CM

## 2024-03-06 RX ORDER — GLIPIZIDE 10 MG/1
20 TABLET, FILM COATED, EXTENDED RELEASE ORAL DAILY
Qty: 60 TABLET | Refills: 2 | Status: SHIPPED | OUTPATIENT
Start: 2024-03-06 | End: 2024-04-04

## 2024-03-06 RX ORDER — EMPAGLIFLOZIN 25 MG/1
25 TABLET, FILM COATED ORAL DAILY
Qty: 30 TABLET | Refills: 2 | Status: SHIPPED | OUTPATIENT
Start: 2024-03-06 | End: 2024-05-28

## 2024-03-06 NOTE — TELEPHONE ENCOUNTER
Requested Prescriptions   Pending Prescriptions Disp Refills    JARDIANCE 25 MG TABS tablet [Pharmacy Med Name: JARDIANCE 25MG TABS] 30 tablet 0     Sig: TAKE ONE TABLET BY MOUTH ONCE DAILY       Sodium Glucose Co-Transport Inhibitor Agents Failed - 3/6/2024 11:59 AM        Failed - Has GFR on file in past 12 months and most recent value is normal        Passed - Patient has documented A1c within the specified period of time.     If HgbA1C is 8 or greater, it needs to be on file within the past 3 months.  If less than 8, must be on file within the past 6 months.     Recent Labs   Lab Test 02/01/24  0934   A1C 8.5*             Passed - Medication is active on med list        Passed - Recent (6 mo) or future (90 days) visit within the authorizing provider's specialty     The patient must have completed an in-person or virtual visit within the past 6 months or has a future visit scheduled within the next 90 days with the authorizing provider s specialty.  Urgent care and e-visits do not quality as an office visit for this protocol.          Passed - Medication indicated for associated diagnosis     Medication is associated with one or more of the following diagnoses:     Diabetic nephropathy, With Albuminuria - Type 2 diabetes mellitus     Disorder of cardiovascular system; Prophylaxis - Type 2 diabetes mellitus     Type 2 diabetes mellitus    Disorder of cardiovascular system; Prophylaxis - Heart failure   Chronic kidney disease, (At risk of progression) to reduce the risk of sustained   estimated GFR decline, end-stage kidney disease, cardiovascular death,   and hospitalization for heart failure     Heart failure, (NYHA class II to IV, reduced ejection fraction) to reduce risk of  cardiovascular death and hospitalization           Passed - Patient is age 18 or older        Passed - Patient has documented normal Potassium within the last 12 mos.     Recent Labs   Lab Test 05/25/23  0932   POTASSIUM 4.8                glipiZIDE (GLUCOTROL XL) 10 MG 24 hr tablet [Pharmacy Med Name: GLIPIZIDE ER 10MG TB24] 60 tablet 0     Sig: TAKE TWO TABLETS BY MOUTH ONCE DAILY       Sulfonylurea Agents Failed - 3/6/2024 11:59 AM        Failed - Has GFR on file in past 12 months and most recent value is normal        Failed - Patient has a recent creatinine (normal) within the past 12 mos.     Recent Labs   Lab Test 05/25/23  0932   CR 1.32*       Ok to refill medication if creatinine is low          Passed - Patient has documented A1c within the specified period of time.     If HgbA1C is 8 or greater, it needs to be on file within the past 3 months.  If less than 8, must be on file within the past 6 months.     Recent Labs   Lab Test 02/01/24  0934   A1C 8.5*             Passed - Medication is active on med list        Passed - Recent (6 mo) or future (90 days) visit within the authorizing provider's specialty     The patient must have completed an in-person or virtual visit within the past 6 months or has a future visit scheduled within the next 90 days with the authorizing provider s specialty.  Urgent care and e-visits do not quality as an office visit for this protocol.          Passed - Medication indicated for associated diagnosis     Medication is associated with one or more of the following diagnoses:  Maturity-onset diabetes of the young   Peripheral circulatory disorder due to type 2 diabetes mellitus   Type 2 diabetes mellitus           Passed - Patient is age 18 or older

## 2024-03-19 DIAGNOSIS — E11.65 TYPE 2 DIABETES MELLITUS WITH HYPERGLYCEMIA, WITHOUT LONG-TERM CURRENT USE OF INSULIN (H): ICD-10-CM

## 2024-03-19 DIAGNOSIS — E11.21 TYPE 2 DIABETES MELLITUS WITH DIABETIC NEPHROPATHY, WITHOUT LONG-TERM CURRENT USE OF INSULIN (H): ICD-10-CM

## 2024-03-19 RX ORDER — PIOGLITAZONEHYDROCHLORIDE 45 MG/1
45 TABLET ORAL DAILY
Qty: 30 TABLET | Refills: 2 | Status: SHIPPED | OUTPATIENT
Start: 2024-03-19 | End: 2024-06-17

## 2024-03-19 NOTE — TELEPHONE ENCOUNTER
Requested Prescriptions   Pending Prescriptions Disp Refills    pioglitazone (ACTOS) 45 MG tablet [Pharmacy Med Name: PIOGLITAZONE HCL 45MG TABS] 30 tablet 0     Sig: TAKE ONE TABLET BY MOUTH ONCE DAILY       Thiazolidinedione Agents (TZDs)  Failed - 3/19/2024  3:22 PM        Failed - Has GFR on file in past 12 months and most recent value is normal        Passed - Patient has a normal ALT within the past 12 mos.     Recent Labs   Lab Test 05/25/23  0932   ALT 26             Passed - Patient has a normal AST within the past 12 mos.      Recent Labs   Lab Test 05/25/23  0932   AST 20             Passed - Patient has documented A1c within the specified period of time.     If HgbA1C is 8 or greater, it needs to be on file within the past 3 months.  If less than 8, must be on file within the past 6 months.     Recent Labs   Lab Test 02/01/24  0934   A1C 8.5*             Passed - Diagnosis not CHF        Passed - Medication is active on med list        Passed - Recent (6 mo) or future (90 days) visit within the authorizing provider's specialty     The patient must have completed an in-person or virtual visit within the past 6 months or has a future visit scheduled within the next 90 days with the authorizing provider s specialty.  Urgent care and e-visits do not quality as an office visit for this protocol.          Passed - Medication indicated for associated diagnosis     Medication is associated with one or more of the following diagnoses:   - Type 2 diabetes mellitus          Passed - Patient is age 18 or older

## 2024-04-04 ENCOUNTER — TELEPHONE (OUTPATIENT)
Dept: INTERNAL MEDICINE | Facility: CLINIC | Age: 69
End: 2024-04-04
Payer: COMMERCIAL

## 2024-04-04 DIAGNOSIS — E11.21 TYPE 2 DIABETES MELLITUS WITH DIABETIC NEPHROPATHY, WITHOUT LONG-TERM CURRENT USE OF INSULIN (H): ICD-10-CM

## 2024-04-04 DIAGNOSIS — E11.65 TYPE 2 DIABETES MELLITUS WITH HYPERGLYCEMIA, WITHOUT LONG-TERM CURRENT USE OF INSULIN (H): ICD-10-CM

## 2024-04-04 NOTE — TELEPHONE ENCOUNTER
Patient requests 90 day supply of glipizide ER 10mg and metformin 1000mg. Please review and send new prescriptions for 90 day supply if appropriate.     Thank you,  Keaton Martinez, PharmD  [facility]

## 2024-04-05 NOTE — TELEPHONE ENCOUNTER
Requested Prescriptions   Pending Prescriptions Disp Refills    metFORMIN (GLUCOPHAGE) 1000 MG tablet [Pharmacy Med Name: METFORMIN HCL 1000MG TABS] 180 tablet 0     Sig: TAKE ONE TABLET BY MOUTH TWICE A DAY WITH MEALS       Biguanide Agents Failed - 4/4/2024  5:23 PM        Failed - Has GFR on file in past 12 months and most recent value is normal        Passed - Patient is age 10 or older        Passed - Patient has documented A1c within the specified period of time.     If HgbA1C is 8 or greater, it needs to be on file within the past 3 months.  If less than 8, must be on file within the past 6 months.     Recent Labs   Lab Test 02/01/24  0934   A1C 8.5*             Passed - Patient does NOT have a diagnosis of CHF.        Passed - Medication is active on med list        Passed - Medication indicated for associated diagnosis     Medication is associated with one or more of the following diagnoses:     Gestational diabetes mellitus     Hyperinsulinar obesity     Hypersecretion of ovarian androgens    Non-alcoholic fatty liver    Polycystic ovarian syndrome               Pre-diabetes (DM 2 prevention)    Type 2 diabetes mellitus     Weight gain, antipsychotic therapy-induced             Passed - Recent (6 mo) or future (90 days) visit within the authorizing provider's specialty     The patient must have completed an in-person or virtual visit within the past 6 months or has a future visit scheduled within the next 90 days with the authorizing provider s specialty.  Urgent care and e-visits do not quality as an office visit for this protocol.

## 2024-04-08 RX ORDER — GLIPIZIDE 10 MG/1
20 TABLET, FILM COATED, EXTENDED RELEASE ORAL DAILY
Qty: 180 TABLET | Refills: 0 | Status: SHIPPED | OUTPATIENT
Start: 2024-04-08 | End: 2024-06-24

## 2024-04-15 ENCOUNTER — PATIENT OUTREACH (OUTPATIENT)
Dept: CARE COORDINATION | Facility: CLINIC | Age: 69
End: 2024-04-15
Payer: COMMERCIAL

## 2024-04-15 ENCOUNTER — TELEPHONE (OUTPATIENT)
Dept: INTERNAL MEDICINE | Facility: CLINIC | Age: 69
End: 2024-04-15
Payer: COMMERCIAL

## 2024-04-15 DIAGNOSIS — E11.65 TYPE 2 DIABETES MELLITUS WITH HYPERGLYCEMIA, WITHOUT LONG-TERM CURRENT USE OF INSULIN (H): ICD-10-CM

## 2024-04-15 DIAGNOSIS — I10 ESSENTIAL HYPERTENSION WITH GOAL BLOOD PRESSURE LESS THAN 140/90: ICD-10-CM

## 2024-04-15 RX ORDER — VALSARTAN 160 MG/1
160 TABLET ORAL DAILY
Qty: 90 TABLET | Refills: 0 | Status: SHIPPED | OUTPATIENT
Start: 2024-04-15 | End: 2024-06-24

## 2024-04-15 NOTE — TELEPHONE ENCOUNTER
Called and spoke with patient. He made a medicare annual wellness appointment  on same day as his Endo appointment 6-.

## 2024-04-15 NOTE — TELEPHONE ENCOUNTER
Medication refilled at this time, patient needs to be seen every 6 months for chronic medical issues and refill on chronic medications and patient is due for appointment in 06/24.  Please advise patient and schedule on any of my same-day or approval only slot

## 2024-05-15 ENCOUNTER — OFFICE VISIT (OUTPATIENT)
Dept: SLEEP MEDICINE | Facility: CLINIC | Age: 69
End: 2024-05-15
Payer: COMMERCIAL

## 2024-05-15 VITALS
DIASTOLIC BLOOD PRESSURE: 76 MMHG | HEART RATE: 97 BPM | WEIGHT: 281.9 LBS | BODY MASS INDEX: 38.22 KG/M2 | SYSTOLIC BLOOD PRESSURE: 143 MMHG | OXYGEN SATURATION: 94 %

## 2024-05-15 DIAGNOSIS — G47.33 OSA (OBSTRUCTIVE SLEEP APNEA): Primary | ICD-10-CM

## 2024-05-15 PROCEDURE — 99213 OFFICE O/P EST LOW 20 MIN: CPT | Performed by: PHYSICIAN ASSISTANT

## 2024-05-15 NOTE — PROGRESS NOTES
[facility]   Outpatient Sleep Medicine  May 15, 2024       Name: Francois Fontana MRN# [id number]   Age: 68 year old YOB: 1955            Assessment and Plan:   1. JUSTIN (obstructive sleep apnea)  Patient's sleep apnea is adequately managed and well treated with current PAP settings [id number] with low residual AHI of 2.0 events per hour. Compliance is excellent, using nightly for an average of 6 hours.  No residual daytime sleepiness on CPAP therapy.  Continues to tolerate PAP well and will continue nightly use.  Still planning to go back to work as a , has not gotten a job yet but passed his DOT physical past March.  We agreed to have him return sometime mid February for annual CPAP follow-up visit so we can obtain download and clearance for DOT physical that he will have going forward every March.  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  - Comprehensive DME       Chief Complaint      Chief Complaint   Patient presents with    Sleep Problem     Cpap check          History of Present Illness:     Francois Fontana is a 68 year old male with obesity, T2DM, HTN, JUSTIN on CPAP who presents to the clinic for CPAP follow-up.     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 Split-night PSG completed 9/30/2015 (274.1#, BMI 36.3) revealing AHI 31.6, RDI 41.7, REM AHI-, supine AHI 70.3, oxygen obey 83%, 5.6 minutes spent less than 89%.  CPAP was titrated to 7 cm H2O and deemed optimal with residual AHI 2.2, though no REM supine. PLM's seen during study but not associated with cortical arousal.     Patient was last seen 1/5/2024 to reestablish care with our office.  Was doing very well on auto CPAP 6-12 cm H2O with low residual AHI of 2.5 events per hour and excellent compliance, but some air hunger and occasional snoring per wife so we agreed to increase  settings to 9-14 cm H2O.  Returns today to discuss progress on updated settings.    Doing very well today after pressure change.  Wife still comments on occasional snoring but mild and not bothersome.  No gasping arousals.  Pressure setting is comfortable, no air hunger.  Patient uses a nasal mask.  Mask is comfortable and is not leaking but he is requesting new mask today given that he has taped his together.    Bedtime is typically 12:00AM. Usually it takes few minutes to fall asleep with the mask on. Wake time is typically 5:30-6:00AM. Wakes once a night for restroom, no issues returning to sleep.     Respironics Dreamstation 2 Auto-PAP 9-14 cmH2O download 4/14/2024 - 5/13/2024):  30 total days of use. 0 nonuse days.  Average use 5 hours 56 minutes per day.  30 days with >4 hours use.  Large leak 0 sec per day average. CPAP 90% pressure 12.0cm. AHI 2.0    SCALES:   INSOMNIA: Insomnia Severity Score: 2   SLEEPINESS: [place] Sleepiness Score: 3    Past medical/surgical history, family history, social history, medications and allergies were reviewed.           Physical Examination:   BP (!) 143/76   Pulse 97   Wt 127.9 kg (281 lb 14.4 oz)   SpO2 94%   BMI 38.22 kg/m    General appearance: Awake, alert, cooperative. Well groomed. Sitting comfortably in chair. In no apparent distress.  HEENT: Head: Normocephalic, atraumatic. Eyes:Conjunctiva clear. Sclera normal. Nose: External appearance without deformity.   Pulmonary:  Able to speak easily in full sentences. No cough or wheeze.   Skin:  No rashes or significant lesions on visible skin.   Neurologic: Alert, oriented x3.   Psychiatric: Mood euthymic. Affect congruent with full range and intensity.      CC:  Delano Murray PA-C  May 15, 2024     [facility]  [address] , [place] [address]     [facility]  [address] [address]    Chart  documentation was completed, in part, with [employer] voice-recognition software. Even though reviewed, some grammatical, spelling, and word errors may remain.

## 2024-05-15 NOTE — NURSING NOTE
"Chief Complaint   Patient presents with    Sleep Problem     Cpap check       Initial Wt 127.9 kg (281 lb 14.4 oz)   BMI 38.22 kg/m   Estimated body mass index is 38.22 kg/m  as calculated from the following:    Height as of 2/7/24: 1.829 m (6' 0.01\").    Weight as of this encounter: 127.9 kg (281 lb 14.4 oz).    Medication Reconciliation: complete    ESS 3    ROSY 2    DME: MANUEL Byers CMA ([facility]AMA)  "

## 2024-05-28 DIAGNOSIS — E11.21 TYPE 2 DIABETES MELLITUS WITH DIABETIC NEPHROPATHY, WITHOUT LONG-TERM CURRENT USE OF INSULIN (H): ICD-10-CM

## 2024-05-28 RX ORDER — EMPAGLIFLOZIN 25 MG/1
25 TABLET, FILM COATED ORAL DAILY
Qty: 30 TABLET | Refills: 0 | Status: SHIPPED | OUTPATIENT
Start: 2024-05-28 | End: 2024-06-24

## 2024-05-28 NOTE — TELEPHONE ENCOUNTER
Requested Prescriptions   Pending Prescriptions Disp Refills    JARDIANCE 25 MG TABS tablet [Pharmacy Med Name: JARDIANCE 25MG TABS] 30 tablet 2     Sig: TAKE ONE TABLET BY MOUTH ONCE DAILY       Sodium Glucose Co-Transport Inhibitor Agents Failed - 5/28/2024 10:45 AM        Failed - Patient has documented A1c within the specified period of time.     If HgbA1C is 8 or greater, it needs to be on file within the past 3 months.  If less than 8, must be on file within the past 6 months.     Recent Labs   Lab Test 02/01/24  0934   A1C 8.5*             Failed - Has GFR on file in past 12 months and most recent value is normal        Failed - Patient has documented normal Potassium within the last 12 mos.     Recent Labs   Lab Test 05/25/23  0932   POTASSIUM 4.8             Passed - Medication is active on med list        Passed - Recent (6 mo) or future (90 days) visit within the authorizing provider's specialty     The patient must have completed an in-person or virtual visit within the past 6 months or has a future visit scheduled within the next 90 days with the authorizing provider s specialty.  Urgent care and e-visits do not quality as an office visit for this protocol.          Passed - Medication indicated for associated diagnosis     Medication is associated with one or more of the following diagnoses:     Diabetic nephropathy, With Albuminuria - Type 2 diabetes mellitus     Disorder of cardiovascular system; Prophylaxis - Type 2 diabetes mellitus     Type 2 diabetes mellitus    Disorder of cardiovascular system; Prophylaxis - Heart failure   Chronic kidney disease, (At risk of progression) to reduce the risk of sustained   estimated GFR decline, end-stage kidney disease, cardiovascular death,   and hospitalization for heart failure     Heart failure, (NYHA class II to IV, reduced ejection fraction) to reduce risk of  cardiovascular death and hospitalization           Passed - Patient is age 18 or older

## 2024-06-17 ENCOUNTER — TELEPHONE (OUTPATIENT)
Dept: ENDOCRINOLOGY | Facility: CLINIC | Age: 69
End: 2024-06-17

## 2024-06-17 ENCOUNTER — TELEPHONE (OUTPATIENT)
Dept: INTERNAL MEDICINE | Facility: CLINIC | Age: 69
End: 2024-06-17

## 2024-06-17 ENCOUNTER — LAB (OUTPATIENT)
Dept: LAB | Facility: CLINIC | Age: 69
End: 2024-06-17
Payer: COMMERCIAL

## 2024-06-17 DIAGNOSIS — E11.21 TYPE 2 DIABETES MELLITUS WITH DIABETIC NEPHROPATHY, WITHOUT LONG-TERM CURRENT USE OF INSULIN (H): ICD-10-CM

## 2024-06-17 DIAGNOSIS — E78.5 HYPERLIPIDEMIA LDL GOAL <70: ICD-10-CM

## 2024-06-17 DIAGNOSIS — I10 ESSENTIAL HYPERTENSION WITH GOAL BLOOD PRESSURE LESS THAN 140/90: ICD-10-CM

## 2024-06-17 PROBLEM — Z76.89 HEALTH CARE HOME: Status: RESOLVED | Noted: 2022-09-13 | Resolved: 2024-06-17

## 2024-06-17 LAB — HBA1C MFR BLD: 8.3 % (ref 0–5.6)

## 2024-06-17 PROCEDURE — 82565 ASSAY OF CREATININE: CPT

## 2024-06-17 PROCEDURE — 36415 COLL VENOUS BLD VENIPUNCTURE: CPT

## 2024-06-17 PROCEDURE — 83036 HEMOGLOBIN GLYCOSYLATED A1C: CPT

## 2024-06-17 PROCEDURE — 82570 ASSAY OF URINE CREATININE: CPT

## 2024-06-17 PROCEDURE — 82043 UR ALBUMIN QUANTITATIVE: CPT

## 2024-06-17 NOTE — TELEPHONE ENCOUNTER
FREE DRUG APPLICATION INITIATED    Medication: OZEMPIC (0.25 OR 0.5 MG/DOSE) 2 MG/3ML SC SOPN  Free Drug Program Name:  [employer]  Date Submitted: 6/17/2024 11:32 AM  Phone #: [phone]  Fax #: [phone]  Additional Information: emailed and mail pt portion.  Attached MD portion to the media tab.  If you prefer email let me know who to send it too

## 2024-06-17 NOTE — LETTER
June 18, 2024      Eric Fontana  [address] [id number]        Dear ,    We are writing to inform you of your test results.    Labs/ Imaging studies done with endocrinology are attached.     Follow-up in endocrinology Clinic as scheduled 8/28/24.     Resulted Orders   Albumin Random Urine Quantitative with Creat Ratio   Result Value Ref Range    Creatinine Urine mg/dL 80.6 mg/dL      Comment:      The reference ranges have not been established in urine creatinine. The results should be integrated into the clinical context for interpretation.    Albumin Urine mg/L 163.0 mg/L      Comment:      The reference ranges have not been established in urine albumin. The results should be integrated into the clinical context for interpretation.    Albumin Urine mg/g Cr 202.23 (H) 0.00 - 17.00 mg/g Cr      Comment:      Microalbuminuria is defined as an albumin:creatinine ratio of 17 to 299 for males and 25 to 299 for females. A ratio of albumin:creatinine of 300 or higher is indicative of overt proteinuria.  Due to biologic variability, positive results should be confirmed by a second, first-morning random or 24-hour timed urine specimen. If there is discrepancy, a third specimen is recommended. When 2 out of 3 results are in the microalbuminuria range, this is evidence for incipient nephropathy and warrants increased efforts at glucose control, blood pressure control, and institution of therapy with an angiotensin-converting-enzyme (ACE) inhibitor (if the patient can tolerate it).     Creatinine   Result Value Ref Range    Creatinine 1.45 (H) 0.67 - 1.17 mg/dL    GFR Estimate 53 (L) >60 mL/min/1.73m2      Comment:      eGFR calculated using 2021 CKD-EPI equation.   Hemoglobin A1c   Result Value Ref Range    Hemoglobin A1C 8.3 (H) 0.0 - 5.6 %      Comment:      Normal <5.7%   Prediabetes 5.7-6.4%    Diabetes 6.5% or higher     Note: Adopted from ADA consensus guidelines.    Narrative    Results  consistent with previous, repeat testing unnecessary         If you have any questions or concerns, please call the clinic at the number listed above.       Sincerely,      Maryam Doshi MD

## 2024-06-17 NOTE — TELEPHONE ENCOUNTER
Patient has appointment in 1 week, please check with patient if he is completely out of medications then we can refill for 1 week until his appointment

## 2024-06-18 LAB
CREAT SERPL-MCNC: 1.45 MG/DL (ref 0.67–1.17)
CREAT UR-MCNC: 80.6 MG/DL
EGFRCR SERPLBLD CKD-EPI 2021: 53 ML/MIN/1.73M2
MICROALBUMIN UR-MCNC: 163 MG/L
MICROALBUMIN/CREAT UR: 202.23 MG/G CR (ref 0–17)

## 2024-06-18 NOTE — RESULT ENCOUNTER NOTE
Francois    Labs/ Imaging studies done with endocrinology are attached.    Follow-up in endocrinology Clinic as scheduled 8/28/24.    Please call endocrinology clinic if questions.    Please send a letter with above lab/test results and above comments.    Thank you.    Maryam Doshi MD

## 2024-06-18 NOTE — TELEPHONE ENCOUNTER
Left voicemail for patient to call back.  Please see other encounter (Dr Mason) on this same date.    Upon callback please relay provider message and verify for both encounters

## 2024-06-18 NOTE — TELEPHONE ENCOUNTER
[employer] Patient Assistance Program Application for Ozempic    LAST OFFICE/VIRTUAL VISIT:  02/07/24    FUTURE OFFICE/VIRTUAL VISIT:  06/24/24    Lab Results   Component Value Date    A1C 8.3 06/17/2024    A1C 8.5 02/01/2024    A1C 9.3 09/11/2023    A1C 10.1 05/25/2023    A1C 10.0 02/27/2023    A1C 8.5 09/13/2022    A1C 8.6 06/10/2021    A1C 10.4 05/19/2021    A1C 12.0 04/29/2021    A1C 13.1 04/08/2021           Kimberlee Poole [facility] [place]  [phone]

## 2024-06-18 NOTE — TELEPHONE ENCOUNTER
Left voicemail for patient to call back.  Please see other encounter (Dr Murray) on this same date.    Upon callback please relay provider message and verify for both encounters

## 2024-06-19 NOTE — TELEPHONE ENCOUNTER
Form was emailed and sent to scanning.      Kimberlee Poole, [facility]  Gerardo/Ranjit

## 2024-06-20 DIAGNOSIS — E11.65 TYPE 2 DIABETES MELLITUS WITH HYPERGLYCEMIA, WITHOUT LONG-TERM CURRENT USE OF INSULIN (H): ICD-10-CM

## 2024-06-20 RX ORDER — SIMVASTATIN 20 MG
20 TABLET ORAL AT BEDTIME
Qty: 90 TABLET | Refills: 1 | OUTPATIENT
Start: 2024-06-20

## 2024-06-20 RX ORDER — HYDROCHLOROTHIAZIDE 25 MG/1
25 TABLET ORAL DAILY
Qty: 30 TABLET | Refills: 0 | Status: SHIPPED | OUTPATIENT
Start: 2024-06-20 | End: 2024-06-24

## 2024-06-20 NOTE — TELEPHONE ENCOUNTER
Requested Prescriptions   Pending Prescriptions Disp Refills    Continuous Glucose Sensor (FREESTYLE JANESSA 2 SENSOR) MISC [Pharmacy Med Name: FREESTYLE JANESSA 2 SENSOR  MISC]  0     Sig: CHANGE SENSOR EVERY 14 DAYS       There is no refill protocol information for this order

## 2024-06-20 NOTE — TELEPHONE ENCOUNTER
I just spoke with Eric and he is out of his hydrochlorothiazide if you can renew that medication for now.    Thank you,  Miriam OliveiraD  [facility] [place]

## 2024-06-21 DIAGNOSIS — E78.5 HYPERLIPIDEMIA LDL GOAL <70: ICD-10-CM

## 2024-06-21 RX ORDER — SIMVASTATIN 20 MG
20 TABLET ORAL AT BEDTIME
Qty: 90 TABLET | Refills: 1 | OUTPATIENT
Start: 2024-06-21

## 2024-06-21 NOTE — TELEPHONE ENCOUNTER
Refill request arrived via fax from [facility] for    Simvastatin 20 mg 1x daily at bedtime    Last refilled 1/23/2024  Last appointment with provider 2/7/2024    Please refill as appropriate

## 2024-06-24 ENCOUNTER — OFFICE VISIT (OUTPATIENT)
Dept: INTERNAL MEDICINE | Facility: CLINIC | Age: 69
End: 2024-06-24
Payer: COMMERCIAL

## 2024-06-24 ENCOUNTER — OFFICE VISIT (OUTPATIENT)
Dept: ENDOCRINOLOGY | Facility: CLINIC | Age: 69
End: 2024-06-24
Payer: COMMERCIAL

## 2024-06-24 VITALS
HEIGHT: 72 IN | HEART RATE: 96 BPM | RESPIRATION RATE: 18 BRPM | SYSTOLIC BLOOD PRESSURE: 142 MMHG | DIASTOLIC BLOOD PRESSURE: 79 MMHG | BODY MASS INDEX: 37.48 KG/M2 | WEIGHT: 276.7 LBS | OXYGEN SATURATION: 97 % | TEMPERATURE: 98.1 F

## 2024-06-24 VITALS
DIASTOLIC BLOOD PRESSURE: 72 MMHG | TEMPERATURE: 98 F | SYSTOLIC BLOOD PRESSURE: 120 MMHG | OXYGEN SATURATION: 97 % | BODY MASS INDEX: 37.25 KG/M2 | WEIGHT: 275 LBS | HEIGHT: 72 IN | HEART RATE: 96 BPM | RESPIRATION RATE: 18 BRPM

## 2024-06-24 DIAGNOSIS — N18.31 STAGE 3A CHRONIC KIDNEY DISEASE (H): ICD-10-CM

## 2024-06-24 DIAGNOSIS — E11.21 TYPE 2 DIABETES MELLITUS WITH DIABETIC NEPHROPATHY, WITHOUT LONG-TERM CURRENT USE OF INSULIN (H): ICD-10-CM

## 2024-06-24 DIAGNOSIS — Z00.00 ENCOUNTER FOR MEDICARE ANNUAL WELLNESS EXAM: ICD-10-CM

## 2024-06-24 DIAGNOSIS — Z12.11 SCREENING FOR COLON CANCER: Primary | ICD-10-CM

## 2024-06-24 DIAGNOSIS — E11.65 TYPE 2 DIABETES MELLITUS WITH HYPERGLYCEMIA, WITHOUT LONG-TERM CURRENT USE OF INSULIN (H): ICD-10-CM

## 2024-06-24 DIAGNOSIS — Z12.5 SCREENING FOR PROSTATE CANCER: ICD-10-CM

## 2024-06-24 DIAGNOSIS — E66.01 MORBID OBESITY (H): Primary | ICD-10-CM

## 2024-06-24 DIAGNOSIS — E78.5 HYPERLIPIDEMIA LDL GOAL <70: ICD-10-CM

## 2024-06-24 DIAGNOSIS — I10 ESSENTIAL HYPERTENSION WITH GOAL BLOOD PRESSURE LESS THAN 140/90: ICD-10-CM

## 2024-06-24 LAB
ALBUMIN SERPL BCG-MCNC: 4.3 G/DL (ref 3.5–5.2)
ALP SERPL-CCNC: 63 U/L (ref 40–150)
ALT SERPL W P-5'-P-CCNC: 24 U/L (ref 0–70)
ANION GAP SERPL CALCULATED.3IONS-SCNC: 11 MMOL/L (ref 7–15)
AST SERPL W P-5'-P-CCNC: 22 U/L (ref 0–45)
BILIRUB SERPL-MCNC: 0.3 MG/DL
BUN SERPL-MCNC: 29.8 MG/DL (ref 8–23)
CALCIUM SERPL-MCNC: 9.2 MG/DL (ref 8.8–10.2)
CHLORIDE SERPL-SCNC: 105 MMOL/L (ref 98–107)
CHOLEST SERPL-MCNC: 117 MG/DL
CREAT SERPL-MCNC: 1.24 MG/DL (ref 0.67–1.17)
DEPRECATED HCO3 PLAS-SCNC: 20 MMOL/L (ref 22–29)
EGFRCR SERPLBLD CKD-EPI 2021: 64 ML/MIN/1.73M2
FASTING STATUS PATIENT QL REPORTED: YES
FASTING STATUS PATIENT QL REPORTED: YES
GLUCOSE SERPL-MCNC: 113 MG/DL (ref 70–99)
HDLC SERPL-MCNC: 41 MG/DL
LDLC SERPL CALC-MCNC: 45 MG/DL
NONHDLC SERPL-MCNC: 76 MG/DL
POTASSIUM SERPL-SCNC: 5.3 MMOL/L (ref 3.4–5.3)
PROT SERPL-MCNC: 7.2 G/DL (ref 6.4–8.3)
PSA SERPL DL<=0.01 NG/ML-MCNC: 0.29 NG/ML (ref 0–4.5)
SODIUM SERPL-SCNC: 136 MMOL/L (ref 135–145)
TRIGL SERPL-MCNC: 155 MG/DL

## 2024-06-24 PROCEDURE — 80053 COMPREHEN METABOLIC PANEL: CPT | Performed by: INTERNAL MEDICINE

## 2024-06-24 PROCEDURE — 36415 COLL VENOUS BLD VENIPUNCTURE: CPT | Performed by: INTERNAL MEDICINE

## 2024-06-24 PROCEDURE — G0402 INITIAL PREVENTIVE EXAM: HCPCS | Performed by: INTERNAL MEDICINE

## 2024-06-24 PROCEDURE — 99214 OFFICE O/P EST MOD 30 MIN: CPT | Mod: 25 | Performed by: INTERNAL MEDICINE

## 2024-06-24 PROCEDURE — 99207 PR FOOT EXAM NO CHARGE: CPT | Performed by: PHYSICIAN ASSISTANT

## 2024-06-24 PROCEDURE — 99214 OFFICE O/P EST MOD 30 MIN: CPT | Performed by: PHYSICIAN ASSISTANT

## 2024-06-24 PROCEDURE — G0103 PSA SCREENING: HCPCS | Performed by: INTERNAL MEDICINE

## 2024-06-24 PROCEDURE — 80061 LIPID PANEL: CPT | Performed by: INTERNAL MEDICINE

## 2024-06-24 RX ORDER — PIOGLITAZONEHYDROCHLORIDE 45 MG/1
45 TABLET ORAL DAILY
Qty: 30 TABLET | Refills: 0 | Status: SHIPPED | OUTPATIENT
Start: 2024-06-24 | End: 2024-08-22

## 2024-06-24 RX ORDER — VALSARTAN 160 MG/1
160 TABLET ORAL DAILY
Qty: 90 TABLET | Refills: 1 | Status: SHIPPED | OUTPATIENT
Start: 2024-06-24

## 2024-06-24 RX ORDER — PIOGLITAZONEHYDROCHLORIDE 45 MG/1
45 TABLET ORAL DAILY
Qty: 90 TABLET | Refills: 1 | Status: CANCELLED | OUTPATIENT
Start: 2024-06-24

## 2024-06-24 RX ORDER — HYDROCHLOROTHIAZIDE 25 MG/1
25 TABLET ORAL DAILY
Qty: 90 TABLET | Refills: 1 | Status: SHIPPED | OUTPATIENT
Start: 2024-06-24

## 2024-06-24 RX ORDER — GLIPIZIDE 10 MG/1
20 TABLET, FILM COATED, EXTENDED RELEASE ORAL DAILY
Qty: 180 TABLET | Refills: 1 | Status: CANCELLED | OUTPATIENT
Start: 2024-06-24

## 2024-06-24 RX ORDER — GLIPIZIDE 10 MG/1
20 TABLET, FILM COATED, EXTENDED RELEASE ORAL DAILY
Qty: 180 TABLET | Refills: 0 | Status: SHIPPED | OUTPATIENT
Start: 2024-06-24

## 2024-06-24 RX ORDER — SIMVASTATIN 20 MG
20 TABLET ORAL AT BEDTIME
Qty: 90 TABLET | Refills: 1 | Status: SHIPPED | OUTPATIENT
Start: 2024-06-24

## 2024-06-24 RX ORDER — AMLODIPINE BESYLATE 10 MG/1
10 TABLET ORAL DAILY
Qty: 90 TABLET | Refills: 1 | Status: SHIPPED | OUTPATIENT
Start: 2024-06-24

## 2024-06-24 SDOH — HEALTH STABILITY: PHYSICAL HEALTH: ON AVERAGE, HOW MANY DAYS PER WEEK DO YOU ENGAGE IN MODERATE TO STRENUOUS EXERCISE (LIKE A BRISK WALK)?: 3 DAYS

## 2024-06-24 SDOH — HEALTH STABILITY: PHYSICAL HEALTH: ON AVERAGE, HOW MANY MINUTES DO YOU ENGAGE IN EXERCISE AT THIS LEVEL?: 30 MIN

## 2024-06-24 ASSESSMENT — SOCIAL DETERMINANTS OF HEALTH (SDOH): HOW OFTEN DO YOU GET TOGETHER WITH FRIENDS OR RELATIVES?: NEVER

## 2024-06-24 NOTE — PROGRESS NOTES
Preventive Care Visit  [facility]  Delano Murray MD, Internal Medicine  Jun 24, 2024      Assessment & Plan       (Z00.00) Encounter for Medicare annual wellness exam  Plan:   immunizations reviewed, patient declined colonoscopy, FIT test ordered    (I10) Essential hypertension with goal blood pressure less than 140/90  Comment: Blood pressure stable  Plan: amLODIPine (NORVASC) 10 MG tablet, hydrochlorothiazide (HYDRODIURIL) 25 MG tablet,        valsartan (DIOVAN) 160 MG tablet refilled as directed.explained clearly about the medication,insructions and side effects.  Advised to follow low salt diet and exercise and f/u in 6 mths.        (E78.5) Hyperlipidemia LDL goal <70  Plan: simvastatin (ZOCOR) 20 MG tablet refilled.explained clearly about the medication,insructions and side effects.  Check Lipid panel  reflex to direct LDL Fasting, Comprehensive         metabolic panel            (Z12.5) Screening for prostate cancer  Plan: Prostate Specific Antigen Screen          (N18.31) Stage 3a chronic kidney disease (H)  Plan: Elevated creatinine, advised to avoid nephrotoxins(ibuprofen/Advil/Aleve products) will continue to monitor creatinine    (Z12.11) Screening for colon cancer     Plan: Fecal colorectal cancer screen (FIT)            Patient has been advised of split billing requirements and indicates understanding: Yes        BMI  Estimated body mass index is 37.3 kg/m  as calculated from the following:    Height as of this encounter: 1.829 m (6').    Weight as of this encounter: 124.7 kg (275 lb).   Weight management plan: Discussed healthy diet and exercise guidelines    Counseling  Appropriate preventive services were discussed with this patient, including applicable screening as appropriate for fall prevention, nutrition, physical activity,  weight loss .          Adrián Reyes is a 68 year old, presenting for the following:  Wellness Visit         Health Care  Directive  Patient does not have a Health Care Directive or Living Will: Patient states has Advance Directive and will bring in a copy to clinic.    HPI     Hyperlipidemia Follow-Up    Are you regularly taking any medication or supplement to lower your cholesterol?   Yes- simvastatin   Are you having muscle aches or other side effects that you think could be caused by your cholesterol lowering medication?  No    Hypertension Follow-up    Do you check your blood pressure regularly outside of the clinic? No   Are you following a low salt diet? Yes  Are your blood pressures ever more than 140 on the top number (systolic) OR more   than 90 on the bottom number (diastolic), for example 140/90? Yes    Chronic Kidney Disease Follow-up  Do you take any over the counter pain medicine?: No    Has history of stenosis of left subclavian artery, has been evaluated by vascular in 12/16/2010 and patient was advised- other than controlling the vascular risk factors  no further current evaluation or treatment necessary for asymptomatic left subclavian artery stenosis.    Follows up with endocrinologist for diabetes          6/24/2024   General Health   How would you rate your overall physical health? Good   Feel stress (tense, anxious, or unable to sleep) Not at all            6/24/2024   Nutrition   Diet: Low salt    Diabetic    Vegetarian/vegan       Multiple values from one day are sorted in reverse-chronological order         6/24/2024   Exercise   Days per week of moderate/strenous exercise 3 days   Average minutes spent exercising at this level 30 min            6/24/2024   Social Factors   Frequency of gathering with friends or relatives Never   Worry food won't last until get money to buy more No   Food not last or not have enough money for food? No   Do you have housing? (Housing is defined as stable permanent housing and does not include staying ouside in a car, in a tent, in an abandoned building, in an overnight shelter,  or couch-surfing.) Yes   Are you worried about losing your housing? No   Lack of transportation? No   Unable to get utilities (heat,electricity)? No   Want help with housing or utility concern? No             2024   Fall Risk   Fallen 2 or more times in the past year? No    No   Trouble with walking or balance? No    No       Multiple values from one day are sorted in reverse-chronological order          2024   Activities of Daily Living- Home Safety   Needs help with the following daily activites None of the above   Safety concerns in the home None of the above            2024   Dental   Dentist two times every year? (!) NO            2024   Hearing Screening   Hearing concerns? None of the above            2024   Driving Risk Screening   Patient/family members have concerns about driving No            2024   General Alertness/Fatigue Screening   Have you been more tired than usual lately? No            2024   Urinary Incontinence Screening   Bothered by leaking urine in past 6 months No            2024   TB Screening   Were you born outside of the [place]? No            Today's PHQ-2 Score:       2024    11:02 AM   PHQ-2 (  [employer])   Q1: Little interest or pleasure in doing things 1   Q2: Feeling down, depressed or hopeless 0   PHQ-2 Score 1   Q1: Little interest or pleasure in doing things Several days   Q2: Feeling down, depressed or hopeless Not at all   PHQ-2 Score 1           2024   Substance Use   Alcohol more than 3/day or more than 7/wk Not Applicable   Do you have a current opioid prescription? No   How severe/bad is pain from 1 to 10? 0/10 (No Pain)   Do you use any other substances recreationally? No        Social History     Tobacco Use    Smoking status: Former     Current packs/day: 0.00     Types: Cigarettes     Quit date: 1985     Years since quittin.9    Smokeless tobacco: Never    Tobacco comments:     quit    Vaping Use    Vaping  status: Never Used   Substance Use Topics    Alcohol use: No     Alcohol/week: 0.0 standard drinks of alcohol     Comment: quit drinking age 30    Drug use: No       Last PSA:   PSA   Date Value Ref Range Status   04/29/2021 0.32 0 - 4 ug/L Final     Comment:     Assay Method:  Chemiluminescence using Siemens Vista analyzer     Prostate Specific Antigen Screen   Date Value Ref Range Status   02/27/2023 0.19 0.00 - 4.50 ng/mL Final              Reviewed and updated as needed this visit by Provider                    Past Medical History:   Diagnosis Date    Bilateral incipient cataracts 08/14/2020    Diabetes mellitus, type 2 (H)     Hyperlipidemia     Rosacea     Stenosis of left subclavian artery (H24)     Type II or unspecified type diabetes mellitus without mention of complication, not stated as uncontrolled     Abstracted 07/18/02    Unspecified essential hypertension      Past Surgical History:   Procedure Laterality Date    HERNIA REPAIR, INGUINAL RT/LT  1993    right     Current Outpatient Medications   Medication Sig Dispense Refill    ACCU-CHEK GUIDE test strip USE TO TEST BLOOD SUGAR ONCE DAILY 100 strip 0    alcohol swab prep pads Use to swab area of injection/jorge as directed. 100 each 3    amLODIPine (NORVASC) 10 MG tablet Take 1 tablet (10 mg) by mouth daily 90 tablet 1    aspirin 81 MG EC tablet Take 81 mg by mouth daily      blood glucose calibration (NO BRAND SPECIFIED) solution Use to calibrate blood glucose monitor as needed as directed. 1 each 0    blood glucose monitoring (NO BRAND SPECIFIED) meter device kit Use to test blood sugar 1 times daily. 1 kit 0    blood glucose monitoring (SOFTCLIX) lancets USE TO TEST BLOOD SUGAR ONCE DAILY 100 each 0    Continuous Blood Gluc  (FREESTYLE JANESSA 2 READER) KADY Use to read blood sugars as per 's instructions. 1 each 0    Continuous Glucose Sensor (FREESTYLE JANESSA 2 SENSOR) MISC CHANGE SENSOR EVERY 14 DAYS 6 each 0    empagliflozin  (JARDIANCE) 25 MG TABS tablet Take 1 tablet (25 mg) by mouth daily 30 tablet 0    glipiZIDE (GLUCOTROL XL) 10 MG 24 hr tablet Take 2 tablets (20 mg) by mouth daily 180 tablet 0    hydrochlorothiazide (HYDRODIURIL) 25 MG tablet Take 1 tablet (25 mg) by mouth daily 90 tablet 1    insulin pen needle (B-D U/F) 31G X 5 MM miscellaneous USE 1 PEN NEEDLE DAILY OR AS DIRECTED. 100 each 0    metFORMIN (GLUCOPHAGE) 1000 MG tablet Take 1 tablet (1,000 mg) by mouth 2 times daily (with meals) 180 tablet 0    order for DME Patient Francois Fontana was set up at [place] on September 23, 2015. Patient received a Alay[employer] Resp[employer] DreamStation Auto CPAP Auto. Pressures were set at Auto 6 - 12 cm H2O.   Patient s ramp is 5 cm H2O for 30 min and FLEX/EPR is A Flex.  Patient received a Alayna Respironics Mask name: Wisp  Nasal mask Size Small, Medium, Large, heated tubing and heated humidifier.  Patient is enrolled in the STM Program and does need to meet compliance. Patient has a follow up on 11/4/15 with Bennett Goltz, PA.     Myra Lim (entered by Nitin Wilson)      pioglitazone (ACTOS) 45 MG tablet Take 1 tablet (45 mg) by mouth daily 30 tablet 0    Semaglutide, 1 MG/DOSE, (OZEMPIC) 4 MG/3ML pen Inject 1 mg Subcutaneous every 7 days 3 mL 3    simvastatin (ZOCOR) 20 MG tablet Take 1 tablet (20 mg) by mouth at bedtime 90 tablet 1    valsartan (DIOVAN) 160 MG tablet Take 1 tablet (160 mg) by mouth daily 90 tablet 1    doxycycline hyclate (VIBRAMYCIN) 100 MG capsule Take 1 capsule (100 mg) by mouth 2 times daily 180 capsule 0     Current providers sharing in care for this patient include:  Patient Care Team:  Delano Murray MD as PCP - General (Internal Medicine)  Ana Maria Boyle, GURINDER as Personal Advocate & Liaison (PAL) (Family Medicine)  Delano Murray MD as Assigned PCP  Mica Askew OD as MD (Ophthalmology)  Mica Askew OD as Assigned Surgical Provider  Maranda Potter  GAVIN Kunz as Assigned Sleep Provider  Maryam Doshi MD as Assigned Endocrinology Provider    The following health maintenance items are reviewed in Epic and correct as of today:  Health Maintenance   Topic Date Due    RSV VACCINE (Pregnancy & 60+) (1 - 1-dose 60+ series) Never done    COLORECTAL CANCER SCREENING  08/19/2018    AORTIC ANEURYSM SCREENING (SYSTEM ASSIGNED)  Never done    ANNUAL REVIEW OF HM ORDERS  04/08/2022    COVID-19 Vaccine (6 - 2023-24 season) 09/01/2023    PSA  02/27/2024    MEDICARE ANNUAL WELLNESS VISIT  05/15/2024    BMP  05/25/2024    LIPID  05/25/2024    HEMOGLOBIN  05/25/2024    EYE EXAM  08/31/2024    A1C  09/17/2024    MICROALBUMIN  06/17/2025    DIABETIC FOOT EXAM  06/24/2025    FALL RISK ASSESSMENT  06/24/2025    DTAP/TDAP/TD IMMUNIZATION (4 - Td or Tdap) 03/13/2029    ADVANCE CARE PLANNING  06/24/2029    HEPATITIS C SCREENING  Completed    PHQ-2 (once per calendar year)  Completed    INFLUENZA VACCINE  Completed    Pneumococcal Vaccine: 65+ Years  Completed    URINALYSIS  Completed    ZOSTER IMMUNIZATION  Completed    IPV IMMUNIZATION  Aged Out    HPV IMMUNIZATION  Aged Out    MENINGITIS IMMUNIZATION  Aged Out    RSV MONOCLONAL ANTIBODY  Aged Out            Objective    Exam  /72 (BP Location: Right arm, Patient Position: Right side, Cuff Size: Adult Large)   Pulse 96   Temp 98  F (36.7  C)   Resp 18   Ht 1.829 m (6')   Wt 124.7 kg (275 lb)   SpO2 97%   BMI 37.30 kg/m     Estimated body mass index is 37.3 kg/m  as calculated from the following:    Height as of this encounter: 1.829 m (6').    Weight as of this encounter: 124.7 kg (275 lb).    Physical Exam  GENERAL: alert and no distress  EYES: Eyes grossly normal to inspection, PERRL and conjunctivae and sclerae normal  HENT: ear canals and TM's normal, nose and mouth without ulcers or lesions  RESP: lungs clear to auscultation - no rales, rhonchi or wheezes  CV: regular rate and rhythm, normal S1 S2,     ABDOMEN: soft, nontender, and bowel sounds normal  MS: no gross musculoskeletal defects noted, no edema  NEURO: Normal strength and tone, mentation intact and speech normal  PSYCH: mentation appears normal, affect normal/bright        6/24/2024   Mini Cog   Clock Draw Score 2 Normal    2 Normal   3 Item Recall 3 objects recalled    3 objects recalled   Mini Cog Total Score 5    5       Multiple values from one day are sorted in reverse-chronological order         Vision Screen  Reason Vision Screen Not Completed: Parent/Patient declined - Had recent screening      Signed Electronically by: Delano Murray MD

## 2024-06-24 NOTE — LETTER
6/24/2024      Francois Fontana  [address] [place] [address]      Dear Colleague,    Thank you for referring your patient, Francois Fontana, to the [facility]. Please see a copy of my visit note below.    Assessment/Plan :   Type 2 DM. Eric is doing better. He was disappointed that his A1C was not under 8% but it is improved from previous. We reviewed his current CGMS report and he needs to scan the sensor more often. I encouraged him to scan the sensor whenever he eats. We also reviewed his current medications and I would like him to try increasing Ozempic to 1 mg weekly. This should help to lower his blood sugar and it may contribute to weight loss. He will continue to work on diet and exercise as well. He has a follow-up with Dr. Doshi in August.       I have independently reviewed and interpreted labs, imaging as indicated.      Chief complaint:  Francois is a 68 year old male who returns for follow-up of Type 2 DM.    I have reviewed Care Everywhere including [facility], [facility], [facility], [facility] , [facility], [place] lab reports, imaging reports and provider notes as indicated.      HISTORY OF PRESENT ILLNESS  Eric continues to work on managing his blood sugars. He was hopeful that his A1C would be under 8%. He has been taking his medications daily and he has been trying to make healthier dietary changes. He currently takes Ozempic 0.5 mg weekly, pioglitazone 45 mg daily, metformin 2000 mg daily, and Jardiance 25 mg daily. He monitors his blood sugars with the FreeStyle Tre sensor.     Eric has not had any problems with severe hyperglycemia and/or hypoglycemia. He continues to struggle with post-prandial elevations. He relies on the food pantry and he struggles to find healthy options. He also has not been able to exercise as much as he would like due to the rain. Eric is hoping to return to work within the next month, which  will also help with overall blood sugar control. Eric has not had any problems with blurred vision or any increase in numbness/tingling in his feet.    Eric has had diabetes since 1990. 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 He has taken a variety of oral medications over the years. He feels like he had much better control over his blood sugars before long term. Since being home, he gets bored and tends to snack. He has no previous hospitalizations for DKA or hypoglycemia. However, he states that he feels like his blood sugars are either too high or too low, consistently. His diabetes is complicated by obesity, hyperlipidemia, HTN, stage 3 CKD, and JUSTIN.     Endocrine relevant labs are as follows:   Latest Reference Range & Units 06/17/24 10:16   Hemoglobin A1C 0.0 - 5.6 % 8.3 (H)   (H): Data is abnormally high   Latest Reference Range & Units 06/17/24 10:21   Albumin Urine mg/g Cr 0.00 - 17.00 mg/g Cr 202.23 (H)   (H): Data is abnormally high   Latest Reference Range & Units 06/17/24 10:21   Albumin Urine mg/L mg/L 163.0      Latest Reference Range & Units 02/01/24 09:34   Hemoglobin A1C 0.0 - 5.6 % 8.5 (H)   (H): Data is abnormally high    REVIEW OF SYSTEMS    Endocrine: positive for diabetes  Skin: positive for pigmentation, rash  Eyes: negative for, visual blurring, redness, tearing  Ears/Nose/Throat: negative  Respiratory: No shortness of breath, dyspnea on exertion, cough, or hemoptysis  Cardiovascular: negative for, chest pain, dyspnea on exertion, lower extremity edema, and exercise intolerance  Gastrointestinal: positive for nausea and excessive gas or bloating, negative for, vomiting, constipation, and diarrhea  Genitourinary: negative for, nocturia, dysuria, frequency, and urgency  Musculoskeletal: negative for, muscular weakness, nocturnal cramping, and foot pain  Neurologic: negative for, local weakness, numbness or tingling of hands, and  numbness or tingling of feet  Psychiatric: negative  Hematologic/Lymphatic/Immunologic: negative    Past Medical History  Past Medical History:   Diagnosis Date     Bilateral incipient cataracts 08/14/2020     Diabetes mellitus, type 2 (H)      Hyperlipidemia      Rosacea      Stenosis of left subclavian artery (H24)      Type II or unspecified type diabetes mellitus without mention of complication, not stated as uncontrolled     Abstracted 07/18/02     Unspecified essential hypertension        Medications  Current Outpatient Medications   Medication Sig Dispense Refill     ACCU-CHEK GUIDE test strip USE TO TEST BLOOD SUGAR ONCE DAILY 100 strip 0     alcohol swab prep pads Use to swab area of injection/jorge as directed. 100 each 3     amLODIPine (NORVASC) 10 MG tablet Take 1 tablet (10 mg) by mouth daily 90 tablet 1     aspirin 81 MG EC tablet Take 81 mg by mouth daily       blood glucose calibration (NO BRAND SPECIFIED) solution Use to calibrate blood glucose monitor as needed as directed. 1 each 0     blood glucose monitoring (NO BRAND SPECIFIED) meter device kit Use to test blood sugar 1 times daily. 1 kit 0     blood glucose monitoring (SOFTCLIX) lancets USE TO TEST BLOOD SUGAR ONCE DAILY 100 each 0     Continuous Blood Gluc  (FREESTYLE JANESSA 2 READER) KADY Use to read blood sugars as per 's instructions. 1 each 0     Continuous Glucose Sensor (FREESTYLE JANESSA 2 SENSOR) MISC CHANGE SENSOR EVERY 14 DAYS 6 each 0     doxycycline hyclate (VIBRAMYCIN) 100 MG capsule Take 1 capsule (100 mg) by mouth 2 times daily 180 capsule 0     empagliflozin (JARDIANCE) 25 MG TABS tablet TAKE ONE TABLET BY MOUTH ONCE DAILY 30 tablet 0     glipiZIDE (GLUCOTROL XL) 10 MG 24 hr tablet Take 2 tablets (20 mg) by mouth daily 180 tablet 0     hydrochlorothiazide (HYDRODIURIL) 25 MG tablet TAKE ONE TABLET BY MOUTH ONCE DAILY 30 tablet 0     insulin pen needle (B-D U/F) 31G X 5 MM miscellaneous USE 1 PEN NEEDLE DAILY  OR AS DIRECTED. 100 each 0     metFORMIN (GLUCOPHAGE) 1000 MG tablet TAKE ONE TABLET BY MOUTH TWICE A DAY WITH MEALS 180 tablet 0     order for DME Patient Francois Fontana was set up at [place] on 2015. Patient received a Alayna Respironics DreamStation Auto CPAP Auto. Pressures were set at Auto 6 - 12 cm H2O.   Patient s ramp is 5 cm H2O for 30 min and FLEX/EPR is A Flex.  Patient received a Alayna Respironics Mask name: Wisp  Nasal mask Size Small, Medium, Large, heated tubing and heated humidifier.  Patient is enrolled in the STM Program and does need to meet compliance. Patient has a follow up on 11/4/15 with Bennett Goltz, PA.     Myra Lim (entered by Nitin Wilson)       pioglitazone (ACTOS) 45 MG tablet TAKE ONE TABLET BY MOUTH ONCE DAILY 30 tablet 0     semaglutide (OZEMPIC, 0.25 OR 0.5 MG/DOSE,) 2 MG/3ML pen INJECT 0.5 MG UNDER THE SKIN EVERY 7 DAYS 3 mL 0     simvastatin (ZOCOR) 20 MG tablet TAKE 1 TABLET (20 MG) BY MOUTH AT BEDTIME 90 tablet 1     valsartan (DIOVAN) 160 MG tablet TAKE 1 TABLET (160 MG) BY MOUTH DAILY 90 tablet 0       Allergies  No Known Allergies      Family History  family history includes C.A.D. in his father; Cancer in his father; Diabetes Type 2  in his father; Gastrointestinal Disease in his brother, brother, and brother; Prostate Cancer in his father; Respiratory in his mother.    Social History  Social History     Tobacco Use     Smoking status: Former     Current packs/day: 0.00     Types: Cigarettes     Quit date: 1985     Years since quittin.9     Smokeless tobacco: Never     Tobacco comments:     quit    Vaping Use     Vaping status: Never Used   Substance Use Topics     Alcohol use: No     Alcohol/week: 0.0 standard drinks of alcohol     Comment: quit drinking age 30     Drug use: No       Physical Exam  BP (!) 142/79 (BP Location: Left arm, Patient Position: Chair, Cuff Size: Adult Large)   Pulse 96   Temp 98.1  F (36.7  C)  "(Tympanic)   Resp 18   Ht 1.829 m (6' 0.01\")   Wt 125.5 kg (276 lb 11.2 oz)   SpO2 97%   BMI 37.52 kg/m    Body mass index is 37.52 kg/m .  GENERAL :  In no apparent distress  SKIN: Normal color, normal temperature, texture.  No hirsutism, alopecia or purple striae.     EYES: PERRL, EOMI, No scleral icterus,  No proptosis, conjunctival redness, stare, retraction  RESP: Lungs clear to auscultation bilaterally  CARDIAC: Regular rate and rhythm, normal S1 S2, without murmurs, rubs or gallops        NEURO: awake, alert, responds appropriately to questions.  Cranial nerves intact.   Moves all extremities; Gait normal.  No tremor of the outstretched hand.    EXTREMITIES: No clubbing, cyanosis or edema.    DATA REVIEW  FreeStyle LibreView  Time in target range 58%  High 31%, Very High 11%  Current Ave  mg/dl  Time CGM Active: 28%        Again, thank you for allowing me to participate in the care of your patient.        Sincerely,        Ana Maria Tovar PA-C  "

## 2024-06-24 NOTE — PROGRESS NOTES
Assessment/Plan :   Type 2 DM. Eric is doing better. He was disappointed that his A1C was not under 8% but it is improved from previous. We reviewed his current CGMS report and he needs to scan the sensor more often. I encouraged him to scan the sensor whenever he eats. We also reviewed his current medications and I would like him to try increasing Ozempic to 1 mg weekly. This should help to lower his blood sugar and it may contribute to weight loss. He will continue to work on diet and exercise as well. He has a follow-up with Dr. Doshi in August.       I have independently reviewed and interpreted labs, imaging as indicated.      Chief complaint:  Francois is a 68 year old male who returns for follow-up of Type 2 DM.    I have reviewed Care Everywhere including [facility], [facility], [facility], [facility], [facility] , [facility], [facility] lab reports, imaging reports and provider notes as indicated.      HISTORY OF PRESENT ILLNESS  Eric continues to work on managing his blood sugars. He was hopeful that his A1C would be under 8%. He has been taking his medications daily and he has been trying to make healthier dietary changes. He currently takes Ozempic 0.5 mg weekly, pioglitazone 45 mg daily, metformin 2000 mg daily, and Jardiance 25 mg daily. He monitors his blood sugars with the FreeStyle Tre sensor.     Eric has not had any problems with severe hyperglycemia and/or hypoglycemia. He continues to struggle with post-prandial elevations. He relies on the food pantry and he struggles to find healthy options. He also has not been able to exercise as much as he would like due to the rain. Eric is hoping to return to work within the next month, which will also help with overall blood sugar control. Eric has not had any problems with blurred vision or any increase in numbness/tingling in his feet.    Eric has had diabetes since 1990. 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 He has taken a variety of oral medications over the years. He feels like he had much better control over his blood sugars before [facility]. Since being home, he gets bored and tends to snack. He has no previous hospitalizations for DKA or hypoglycemia. However, he states that he feels like his blood sugars are either too high or too low, consistently. His diabetes is complicated by obesity, hyperlipidemia, HTN, stage 3 CKD, and JUSTIN.     Endocrine relevant labs are as follows:   Latest Reference Range & Units 06/17/24 10:16   Hemoglobin A1C 0.0 - 5.6 % 8.3 (H)   (H): Data is abnormally high   Latest Reference Range & Units 06/17/24 10:21   Albumin Urine mg/g Cr 0.00 - 17.00 mg/g Cr 202.23 (H)   (H): Data is abnormally high   Latest Reference Range & Units 06/17/24 10:21   Albumin Urine mg/L mg/L 163.0      Latest Reference Range & Units 02/01/24 09:34   Hemoglobin A1C 0.0 - 5.6 % 8.5 (H)   (H): Data is abnormally high    REVIEW OF SYSTEMS    Endocrine: positive for diabetes  Skin: positive for pigmentation, rash  Eyes: negative for, visual blurring, redness, tearing  Ears/Nose/Throat: negative  Respiratory: No shortness of breath, dyspnea on exertion, cough, or hemoptysis  Cardiovascular: negative for, chest pain, dyspnea on exertion, lower extremity edema, and exercise intolerance  Gastrointestinal: positive for nausea and excessive gas or bloating, negative for, vomiting, constipation, and diarrhea  Genitourinary: negative for, nocturia, dysuria, frequency, and urgency  Musculoskeletal: negative for, muscular weakness, nocturnal cramping, and foot pain  Neurologic: negative for, local weakness, numbness or tingling of hands, and numbness or tingling of feet  Psychiatric: negative  Hematologic/Lymphatic/Immunologic: negative    Past Medical History  Past Medical History:   Diagnosis Date    Bilateral incipient cataracts 08/14/2020    Diabetes mellitus, type 2 (H)      Hyperlipidemia     Rosacea     Stenosis of left subclavian artery (H24)     Type II or unspecified type diabetes mellitus without mention of complication, not stated as uncontrolled     Abstracted 07/18/02    Unspecified essential hypertension        Medications  Current Outpatient Medications   Medication Sig Dispense Refill    ACCU-CHEK GUIDE test strip USE TO TEST BLOOD SUGAR ONCE DAILY 100 strip 0    alcohol swab prep pads Use to swab area of injection/jorge as directed. 100 each 3    amLODIPine (NORVASC) 10 MG tablet Take 1 tablet (10 mg) by mouth daily 90 tablet 1    aspirin 81 MG EC tablet Take 81 mg by mouth daily      blood glucose calibration (NO BRAND SPECIFIED) solution Use to calibrate blood glucose monitor as needed as directed. 1 each 0    blood glucose monitoring (NO BRAND SPECIFIED) meter device kit Use to test blood sugar 1 times daily. 1 kit 0    blood glucose monitoring (SOFTCLIX) lancets USE TO TEST BLOOD SUGAR ONCE DAILY 100 each 0    Continuous Blood Gluc  (FREESTYLE JANESSA 2 READER) KADY Use to read blood sugars as per 's instructions. 1 each 0    Continuous Glucose Sensor (FREESTYLE JANESSA 2 SENSOR) MISC CHANGE SENSOR EVERY 14 DAYS 6 each 0    doxycycline hyclate (VIBRAMYCIN) 100 MG capsule Take 1 capsule (100 mg) by mouth 2 times daily 180 capsule 0    empagliflozin (JARDIANCE) 25 MG TABS tablet TAKE ONE TABLET BY MOUTH ONCE DAILY 30 tablet 0    glipiZIDE (GLUCOTROL XL) 10 MG 24 hr tablet Take 2 tablets (20 mg) by mouth daily 180 tablet 0    hydrochlorothiazide (HYDRODIURIL) 25 MG tablet TAKE ONE TABLET BY MOUTH ONCE DAILY 30 tablet 0    insulin pen needle (B-D U/F) 31G X 5 MM miscellaneous USE 1 PEN NEEDLE DAILY OR AS DIRECTED. 100 each 0    metFORMIN (GLUCOPHAGE) 1000 MG tablet TAKE ONE TABLET BY MOUTH TWICE A DAY WITH MEALS 180 tablet 0    order for DME Patient Francois Fontana was set up at [facility] on September 23, 2015. Patient received a [employer]  "DreamStation Auto CPAP Auto. Pressures were set at Auto 6 - 12 cm H2O.   Patient s ramp is 5 cm H2O for 30 min and FLEX/EPR is A Flex.  Patient received a [employer] Mask name: Wisp  Nasal mask Size Small, Medium, Large, heated tubing and heated humidifier.  Patient is enrolled in the STM Program and does need to meet compliance. Patient has a follow up on 11/4/15 with Bennett Goltz, PA.     Myra Lim (entered by Nitin Wilson)      pioglitazone (ACTOS) 45 MG tablet TAKE ONE TABLET BY MOUTH ONCE DAILY 30 tablet 0    semaglutide (OZEMPIC, 0.25 OR 0.5 MG/DOSE,) 2 MG/3ML pen INJECT 0.5 MG UNDER THE SKIN EVERY 7 DAYS 3 mL 0    simvastatin (ZOCOR) 20 MG tablet TAKE 1 TABLET (20 MG) BY MOUTH AT BEDTIME 90 tablet 1    valsartan (DIOVAN) 160 MG tablet TAKE 1 TABLET (160 MG) BY MOUTH DAILY 90 tablet 0       Allergies  No Known Allergies      Family History  family history includes C.A.D. in his father; Cancer in his father; Diabetes Type 2  in his father; Gastrointestinal Disease in his brother, brother, and brother; Prostate Cancer in his father; Respiratory in his mother.    Social History  Social History     Tobacco Use    Smoking status: Former     Current packs/day: 0.00     Types: Cigarettes     Quit date: 1985     Years since quittin.9    Smokeless tobacco: Never    Tobacco comments:     quit    Vaping Use    Vaping status: Never Used   Substance Use Topics    Alcohol use: No     Alcohol/week: 0.0 standard drinks of alcohol     Comment: quit drinking age 30    Drug use: No       Physical Exam  BP (!) 142/79 (BP Location: Left arm, Patient Position: Chair, Cuff Size: Adult Large)   Pulse 96   Temp 98.1  F (36.7  C) (Tympanic)   Resp 18   Ht 1.829 m (6' 0.01\")   Wt 125.5 kg (276 lb 11.2 oz)   SpO2 97%   BMI 37.52 kg/m    Body mass index is 37.52 kg/m .  GENERAL :  In no apparent distress  SKIN: Normal color, normal temperature, texture.  No hirsutism, alopecia or purple striae.   "   EYES: PERRL, EOMI, No scleral icterus,  No proptosis, conjunctival redness, stare, retraction  RESP: Lungs clear to auscultation bilaterally  CARDIAC: Regular rate and rhythm, normal S1 S2, without murmurs, rubs or gallops        NEURO: awake, alert, responds appropriately to questions.  Cranial nerves intact.   Moves all extremities; Gait normal.  No tremor of the outstretched hand.    EXTREMITIES: No clubbing, cyanosis or edema.    DATA REVIEW  FreeStyle LibreView  Time in target range 58%  High 31%, Very High 11%  Current Ave  mg/dl  Time CGM Active: 28%

## 2024-06-24 NOTE — LETTER
July 9, 2024      Eric Fontana  [address] [facility] [address]        Dear ,    We are writing to inform you of your test results.    I have reviewed all lab results which are normal or stable except slightly above normal triglycerides, continue simvastatin.  Please follow healthy diet and regular exercise.   Kidney test creatinine has improved since before, please hydrate well will continue to monitor.     Resulted Orders   Lipid panel reflex to direct LDL Fasting   Result Value Ref Range    Cholesterol 117 <200 mg/dL    Triglycerides 155 (H) <150 mg/dL    Direct Measure HDL 41 >=40 mg/dL    LDL Cholesterol Calculated 45 <=100 mg/dL    Non HDL Cholesterol 76 <130 mg/dL    Patient Fasting > 8hrs? Yes     Narrative    Cholesterol  Desirable:  <200 mg/dL    Triglycerides  Normal:  Less than 150 mg/dL  Borderline High:  150-199 mg/dL  High:  200-499 mg/dL  Very High:  Greater than or equal to 500 mg/dL    Direct Measure HDL  Female:  Greater than or equal to 50 mg/dL   Male:  Greater than or equal to 40 mg/dL    LDL Cholesterol  Desirable:  <100mg/dL  Above Desirable:  100-129 mg/dL   Borderline High:  130-159 mg/dL   High:  160-189 mg/dL   Very High:  >= 190 mg/dL    Non HDL Cholesterol  Desirable:  130 mg/dL  Above Desirable:  130-159 mg/dL  Borderline High:  160-189 mg/dL  High:  190-219 mg/dL  Very High:  Greater than or equal to 220 mg/dL   Comprehensive metabolic panel   Result Value Ref Range    Sodium 136 135 - 145 mmol/L      Comment:      Reference intervals for this test were updated on 09/26/2023 to more accurately reflect our healthy population. There may be differences in the flagging of prior results with similar values performed with this method. Interpretation of those prior results can be made in the context of the updated reference intervals.     Potassium 5.3 3.4 - 5.3 mmol/L    Carbon Dioxide (CO2) 20 (L) 22 - 29 mmol/L    Anion Gap 11 7 - 15 mmol/L    Urea Nitrogen 29.8 (H)  8.0 - 23.0 mg/dL    Creatinine 1.24 (H) 0.67 - 1.17 mg/dL    GFR Estimate 64 >60 mL/min/1.73m2      Comment:      eGFR calculated using 2021 CKD-EPI equation.    Calcium 9.2 8.8 - 10.2 mg/dL    Chloride 105 98 - 107 mmol/L    Glucose 113 (H) 70 - 99 mg/dL    Alkaline Phosphatase 63 40 - 150 U/L    AST 22 0 - 45 U/L      Comment:      Reference intervals for this test were updated on 6/12/2023 to more accurately reflect our healthy population. There may be differences in the flagging of prior results with similar values performed with this method. Interpretation of those prior results can be made in the context of the updated reference intervals.    ALT 24 0 - 70 U/L      Comment:      Reference intervals for this test were updated on 6/12/2023 to more accurately reflect our healthy population. There may be differences in the flagging of prior results with similar values performed with this method. Interpretation of those prior results can be made in the context of the updated reference intervals.      Protein Total 7.2 6.4 - 8.3 g/dL    Albumin 4.3 3.5 - 5.2 g/dL    Bilirubin Total 0.3 <=1.2 mg/dL    Patient Fasting > 8hrs? Yes    Prostate Specific Antigen Screen   Result Value Ref Range    Prostate Specific Antigen Screen 0.29 0.00 - 4.50 ng/mL    Narrative    This result is obtained using the Roche Elecsys total PSA method on the missy [id number] immunoassay analyzer. Results obtained with different assay methods or kits cannot be used interchangeably.   Fecal colorectal cancer screen (FIT)   Result Value Ref Range    Occult Blood Screen FIT Negative Negative       If you have any questions or concerns, please call the clinic at the number listed above.       Sincerely,      Delano Murray MD

## 2024-06-24 NOTE — COMMUNITY RESOURCES LIST (ENGLISH)
June 24, 2024           YOUR PERSONALIZED LIST OF SERVICES & PROGRAMS           & SHELTER    Housing      [facility] - Shelter for individuals  [address] [place] [address] (Distance: 5.1 miles)  Language: English, Icelandic  Fee: Free      [facility]  [address] [address] (Distance: 7.0 miles)  Phone: [phone]  Website: http://www.[facility]  Language: English  Fee: Free, Sliding scale  Accessibility: Ada accessible      [facility] [place]  Phone: [phone]  Website: https://www.[employer]/  Language: English, Hmong, Oromo, [place] citizen, Icelandic  Hours: Mon 9:00 AM - 5:00 PM Tue 9:00 AM - 5:00 PM Wed 9:00 AM - 5:00 PM Thu 9:00 AM - 5:00 PM Fri 9:00 AM - 5:00 PM  Fee: Insurance  Accessibility: Blind accommodation, Deaf or hard of hearing, Translation services  Transportation Options: Free transportation    Case Management      [employer] - Housing search assistance  [address] [facility] [address] [place] [address] (Distance: 3.3 miles)  Phone: [phone]  Language: English  Fee: Sliding scale, Self pay      [facility]  [address] [facility] [address] [facility] [address]  [place] [address] (Distance: 6.4 miles)  Phone: [phone]  Website: https://[email or web address].[place]./HealthFamily/Disabilities  Language: English  Fee: Insurance, Self pay  Accessibility: Ada Voxxter      [employer] - Housing Stabilization Services  Phone: [phone]  Website: https://homebasemn.com/  Language: English  Hours: Mon 8:00 AM - 4:00 PM Tue 8:00 AM - 4:00 PM Wed 8:00 AM - 4:00 PM Thu 8:00 AM - 4:00 PM Fri 8:00 AM - 4:00 PM  Fee: Free  Accessibility: Blind accommodation, Deaf or hard of hearing  Transportation Options: Free transportation    Drop-In Services      [employer] [facility] [employer]  [address] [place] [address] [address] [place]  [address] (Distance: 16.5 miles)  Phone: [phone]  Language: English  Fee: Free      [facility] [place] [facility]  [address] [place] [address] (Distance: 8.5 miles)  Website: https://www.amn.info/elders  Language: English      [facility] [facility] - MAIL SERVICE FOR THE HOMELESS  Phone: [phone]) [phone]  Website: https://www.[employer]               IMPORTANT NUMBERS & WEBSITES        Emergency Services  911  .   United Way  211 http://211unitedway.org  .   Poison Control  (181) 531-3031 http://[place]poison.org http://wis[place]poison.org  .     Suicide and Crisis Lifeline  988 http://988HighGroundline.org  .   Childhelp [place] Child Abuse Hotline  855.392.7006 http://Childhelphotline.org   .   [place] Sexual Assault Hotline  (906) 244-5982 (HOPE) http://[employer].Renrendai   .     [place] Runaway Safeline  (345) 818-4255 (RUNAWAY) http://[employer].Renrendai  .   Pregnancy & Postpartum Support  Call/text 636-338-0399  [place]: http://ppsupport[place].org  [place]: http://Sarenza.com/wi  .   Substance Abuse National Helpline ([facility])  241-543-HELP (2174) http://Findtreatment.gov   .                DISCLAIMER: These resources have been generated via the [employer] Platform. [employer]  does not endorse any service providers mentioned in this resource list. [employer] does not guarantee that the services mentioned in this resource list will be available to you or will improve your health or wellness.    [facility]

## 2024-06-24 NOTE — PATIENT INSTRUCTIONS
[facility]  Dr Doshi, Endocrinology Department    [address] [facility]  [place] [address]  Appointment [phone]  Fax: [phone]  [place]: Monday - Thursday

## 2024-06-24 NOTE — PROGRESS NOTES
Preventive Care Visit  [facility]  Delano Murray MD, Internal Medicine  Jun 24, 2024  {Provider  Link to SmartSet :208260}    {PROVIDER CHARTING PREFERENCE:046436}    Adrián Reyes is a 68 year old, presenting for the following:  Wellness Visit    {(!) Visit Details have not yet been documented.  Please enter Visit Details and then use this list to pull in documentation. (Optional):700877}     Health Care Directive  Patient does not have a Health Care Directive or Living Will: Patient states has Advance Directive and will bring in a copy to clinic.    HPI     Hyperlipidemia Follow-Up    Are you regularly taking any medication or supplement to lower your cholesterol?   { :584210}  Are you having muscle aches or other side effects that you think could be caused by your cholesterol lowering medication?  { :058661}    Hypertension Follow-up    Do you check your blood pressure regularly outside of the clinic? { :810663}   Are you following a low salt diet? { :212681}  Are your blood pressures ever more than 140 on the top number (systolic) OR more   than 90 on the bottom number (diastolic), for example 140/90? { :247848}    Chronic Kidney Disease Follow-up  {Provider  Link to CKD SmartSet :641275}  Do you take any over the counter pain medicine?: {Yes/No:688318}        6/24/2024   General Health   How would you rate your overall physical health? Good   Feel stress (tense, anxious, or unable to sleep) Not at all            6/24/2024   Nutrition   Diet: Low salt    Diabetic    Vegetarian/vegan       Multiple values from one day are sorted in reverse-chronological order         6/24/2024   Exercise   Days per week of moderate/strenous exercise 3 days   Average minutes spent exercising at this level 30 min            6/24/2024   Social Factors   Frequency of gathering with friends or relatives Never   Worry food won't last until get money to buy more No   Food not last or not have  enough money for food? No   Do you have housing? (Housing is defined as stable permanent housing and does not include staying ouside in a car, in a tent, in an abandoned building, in an overnight shelter, or couch-surfing.) No   Are you worried about losing your housing? No   Lack of transportation? No   Unable to get utilities (heat,electricity)? No   Want help with housing or utility concern? No      (!) HOUSING CONCERN PRESENT(!) SOCIAL CONNECTIONS CONCERN      6/24/2024   Fall Risk   Fallen 2 or more times in the past year? No    No   Trouble with walking or balance? No    No       Multiple values from one day are sorted in reverse-chronological order          6/24/2024   Activities of Daily Living- Home Safety   Needs help with the following daily activites None of the above   Safety concerns in the home None of the above            6/24/2024   Dental   Dentist two times every year? (!) NO            6/24/2024   Hearing Screening   Hearing concerns? None of the above            6/24/2024   Driving Risk Screening   Patient/family members have concerns about driving No            6/24/2024   General Alertness/Fatigue Screening   Have you been more tired than usual lately? No            6/24/2024   Urinary Incontinence Screening   Bothered by leaking urine in past 6 months No            6/24/2024   TB Screening   Were you born outside of the [place]? No            Today's PHQ-2 Score:       6/24/2024    11:02 AM   PHQ-2 ( 1999 [employer])   Q1: Little interest or pleasure in doing things 1   Q2: Feeling down, depressed or hopeless 0   PHQ-2 Score 1   Q1: Little interest or pleasure in doing things Several days   Q2: Feeling down, depressed or hopeless Not at all   PHQ-2 Score 1           6/24/2024   Substance Use   Alcohol more than 3/day or more than 7/wk Not Applicable   Do you have a current opioid prescription? No   How severe/bad is pain from 1 to 10? 0/10 (No Pain)   Do you use any other substances recreationally? No         Social History     Tobacco Use    Smoking status: Former     Current packs/day: 0.00     Types: Cigarettes     Quit date: 1985     Years since quittin.9    Smokeless tobacco: Never    Tobacco comments:     quit    Vaping Use    Vaping status: Never Used   Substance Use Topics    Alcohol use: No     Alcohol/week: 0.0 standard drinks of alcohol     Comment: quit drinking age 30    Drug use: No     {Provider  If there are gaps in the social history shown above, please follow the link to update and then refresh the note Link to Social and Substance History :803466}  Last PSA:   PSA   Date Value Ref Range Status   2021 0.32 0 - 4 ug/L Final     Comment:     Assay Method:  Chemiluminescence using Siemens Vista analyzer     Prostate Specific Antigen Screen   Date Value Ref Range Status   2023 0.19 0.00 - 4.50 ng/mL Final     ASCVD Risk   The ASCVD Risk score (Jo LAKHANI, et al., 2019) failed to calculate for the following reasons:    The valid total cholesterol range is 130 to 320 mg/dL    {Link to Fracture Risk Assessment Tool (Optional):165620}    {Provider  REQUIRED FOR AWV Use the storyboard to review patient history, after sections have been marked as reviewed, refresh note to capture documentation:092386}  {Provider   REQUIRED AWV use this link to review and update sexual activity history  after section has been marked as reviewed, refresh note to capture documentation:789900}  Reviewed and updated as needed this visit by Provider                    {HISTORY OPTIONS (Optional):298456}  Current providers sharing in care for this patient include:  Patient Care Team:  Delano Murray MD as PCP - General (Internal Medicine)  Ana Maria Boyle, GURINDER as Personal Advocate & Liaison (PAL) (Family Medicine)  Delano Murray MD as Assigned PCP  Mica Askew OD as MD (Ophthalmology)  Mica Askew OD as Assigned Surgical Provider  Tariq  Maranda Kunz PA-C as Assigned Sleep Provider  Maryam Doshi MD as Assigned Endocrinology Provider    The following health maintenance items are reviewed in Epic and correct as of today:  Health Maintenance   Topic Date Due    RSV VACCINE (Pregnancy & 60+) (1 - 1-dose 60+ series) Never done    COLORECTAL CANCER SCREENING  08/19/2018    AORTIC ANEURYSM SCREENING (SYSTEM ASSIGNED)  Never done    ANNUAL REVIEW OF HM ORDERS  04/08/2022    COVID-19 Vaccine (6 - 2023-24 season) 09/01/2023    PSA  02/27/2024    MEDICARE ANNUAL WELLNESS VISIT  05/15/2024    BMP  05/25/2024    LIPID  05/25/2024    HEMOGLOBIN  05/25/2024    EYE EXAM  08/31/2024    A1C  09/17/2024    MICROALBUMIN  06/17/2025    DIABETIC FOOT EXAM  06/24/2025    FALL RISK ASSESSMENT  06/24/2025    ADVANCE CARE PLANNING  05/16/2028    DTAP/TDAP/TD IMMUNIZATION (4 - Td or Tdap) 03/13/2029    HEPATITIS C SCREENING  Completed    PHQ-2 (once per calendar year)  Completed    INFLUENZA VACCINE  Completed    Pneumococcal Vaccine: 65+ Years  Completed    URINALYSIS  Completed    ZOSTER IMMUNIZATION  Completed    IPV IMMUNIZATION  Aged Out    HPV IMMUNIZATION  Aged Out    MENINGITIS IMMUNIZATION  Aged Out    RSV MONOCLONAL ANTIBODY  Aged Out       {ROS Picklists (Optional):457789}     Objective    Exam  /72 (BP Location: Right arm, Patient Position: Right side, Cuff Size: Adult Large)   Pulse 96   Temp 98  F (36.7  C)   Resp 18   Ht 1.829 m (6')   Wt 124.7 kg (275 lb)   SpO2 97%   BMI 37.30 kg/m     Estimated body mass index is 37.3 kg/m  as calculated from the following:    Height as of this encounter: 1.829 m (6').    Weight as of this encounter: 124.7 kg (275 lb).    Physical Exam  {Exam Choices (Optional):721038}  {Provider  The Mini-Cog is incomplete, use link to complete and refresh note Link to Mini-Cog :834651}       No data to display              {A Mini-Cog total score of 0-2 suggests the possibility of dementia, score of 3-5 suggests  no dementia:869031}    Vision Screen     {Provider  Link to Vision and Hearing Results :304222}    Signed Electronically by: Delano Murray MD  {Email feedback regarding this note to [email or web address]   :900256}

## 2024-06-25 RX ORDER — VALSARTAN 160 MG/1
160 TABLET ORAL DAILY
Qty: 90 TABLET | Refills: 0 | OUTPATIENT
Start: 2024-06-25

## 2024-06-25 RX ORDER — AMLODIPINE BESYLATE 10 MG/1
10 TABLET ORAL DAILY
Qty: 90 TABLET | Refills: 1 | OUTPATIENT
Start: 2024-06-25

## 2024-06-25 NOTE — TELEPHONE ENCOUNTER
Hello,    It seems the patient has been approved for Patient Assistance through [employer] for Ozempic through 09/14/2024.    Thank You!    Neno Valverde [facility] Liaison  [employer] Jerry [email or web address]  Phone: [phone]  Fax: [phone]

## 2024-06-27 RX ORDER — DOXYCYCLINE HYCLATE 100 MG
100 TABLET ORAL 2 TIMES DAILY
Qty: 180 TABLET | Refills: 0 | OUTPATIENT
Start: 2024-06-27

## 2024-07-04 PROCEDURE — 82274 ASSAY TEST FOR BLOOD FECAL: CPT | Performed by: INTERNAL MEDICINE

## 2024-07-08 LAB — HEMOCCULT STL QL IA: NEGATIVE

## 2024-07-15 DIAGNOSIS — E11.21 TYPE 2 DIABETES MELLITUS WITH DIABETIC NEPHROPATHY, WITHOUT LONG-TERM CURRENT USE OF INSULIN (H): ICD-10-CM

## 2024-07-16 RX ORDER — EMPAGLIFLOZIN 25 MG/1
25 TABLET, FILM COATED ORAL DAILY
Qty: 30 TABLET | Refills: 1 | Status: SHIPPED | OUTPATIENT
Start: 2024-07-16 | End: 2024-09-23

## 2024-07-16 NOTE — TELEPHONE ENCOUNTER
Requested Prescriptions   Pending Prescriptions Disp Refills    JARDIANCE 25 MG TABS tablet [Pharmacy Med Name: JARDIANCE 25MG TABS] 30 tablet 0     Sig: TAKE ONE TABLET BY MOUTH ONCE DAILY       Sodium Glucose Co-Transport Inhibitor Agents Passed - 7/16/2024 10:35 AM        Passed - Patient has documented A1c within the specified period of time.     If HgbA1C is 8 or greater, it needs to be on file within the past 3 months.  If less than 8, must be on file within the past 6 months.     Recent Labs   Lab Test 06/17/24  1016   A1C 8.3*             Passed - Medication is active on med list        Passed - Has GFR on file in past 12 months and most recent value is normal        Passed - Recent (6 mo) or future (90 days) visit within the authorizing provider's specialty     The patient must have completed an in-person or virtual visit within the past 6 months or has a future visit scheduled within the next 90 days with the authorizing provider s specialty.  Urgent care and e-visits do not quality as an office visit for this protocol.          Passed - Medication indicated for associated diagnosis     Medication is associated with one or more of the following diagnoses:     Diabetic nephropathy, With Albuminuria - Type 2 diabetes mellitus     Disorder of cardiovascular system; Prophylaxis - Type 2 diabetes mellitus     Type 2 diabetes mellitus    Disorder of cardiovascular system; Prophylaxis - Heart failure   Chronic kidney disease, (At risk of progression) to reduce the risk of sustained   estimated GFR decline, end-stage kidney disease, cardiovascular death,   and hospitalization for heart failure     Heart failure, (NYHA class II to IV, reduced ejection fraction) to reduce risk of  cardiovascular death and hospitalization           Passed - Patient is age 18 or older        Passed - Patient has documented normal Potassium within the last 12 mos.     Recent Labs   Lab Test 06/24/24  1142   POTASSIUM 5.3

## 2024-07-17 ENCOUNTER — TELEPHONE (OUTPATIENT)
Dept: INTERNAL MEDICINE | Facility: CLINIC | Age: 69
End: 2024-07-17
Payer: COMMERCIAL

## 2024-07-17 NOTE — TELEPHONE ENCOUNTER
July 15, I have approved Ray for up to $500 of the Pharmacy Assistance Fund to be filled at the [facility].    Eric and the [facility] have been informed.    This brooklyn must be used within 30 days of this approval notice.    Faith James  Prescription Assistance Supervisor  Pharmacy Assistance

## 2024-07-19 ENCOUNTER — TELEPHONE (OUTPATIENT)
Dept: ENDOCRINOLOGY | Facility: CLINIC | Age: 69
End: 2024-07-19
Payer: COMMERCIAL

## 2024-07-19 NOTE — TELEPHONE ENCOUNTER
Spoke to pt- pt was wanting to know if he could use 2 injections of the 0.5 injections to equal the 1mg dosage. Pt states he already spoke to the pharmacy and was told he could. Pt states he could not ge the 1 mg yet as he had other things going on. Pt will call if he still can not get the 1 mg still after next week

## 2024-07-19 NOTE — TELEPHONE ENCOUNTER
[facility] Call Center    Phone Message    May a detailed message be left on voicemail: yes     Reason for Call: Other: Pt has questions regarding his Ozempic medication. Please review and call pt to discuss. Pt states he needs a call back before 12pm today.     Action Taken: Other: Endo    Travel Screening: Not Applicable     Date of Service:

## 2024-07-29 ENCOUNTER — TELEPHONE (OUTPATIENT)
Dept: INTERNAL MEDICINE | Facility: CLINIC | Age: 69
End: 2024-07-29
Payer: COMMERCIAL

## 2024-07-29 NOTE — TELEPHONE ENCOUNTER
July 29, 2024 I spoke with Eric, he is in need of financial assistance for medication.    We reviewed the Pharmacy Assistance Fund $500, the Prescription Assistance Program for manfacturer assistance programs, gross income, insurance and Rx list.    Eric has already been approved and picked up his medication through the Pharmacy Assistance Fund.     assistance applications will be completed for: Ozempic & Jardiance.    It appears [facility] Stack handled the Ozempic application which was approved, but Eric has not gotten a call from the Clinic to pick this up. Please locate Eric's Ozempic and call him to pick this up. I do not know the strength as David did not list it on the Provider section that was scanned into the media tab. Ozempic was only approved until Sept 14, 2024.    I will begin a new Ozempic application at the 1mg strength for enrollment through December 2024.    When approved, Eric will receive this medication at no cost through December 2024.    Thanks so much for your help!    Faith James  Prescription Assistance Supervisor  Pharmacy Assistance

## 2024-07-29 NOTE — TELEPHONE ENCOUNTER
"Hello,    I called [employer] and Rep Trevino gave me a [employer] Shipping number [id number] and told me Ozempic was delivered on 7/11/24 at 10:07AM and received by \"Lionel\" at the [address] [place] [address] address.    Thank You!    Neno Valverde [facility] Jerry [email or web address]  Phone: [phone]  Fax: [phone]      "

## 2024-07-30 ENCOUNTER — TELEPHONE (OUTPATIENT)
Dept: INTERNAL MEDICINE | Facility: CLINIC | Age: 69
End: 2024-07-30
Payer: COMMERCIAL

## 2024-07-30 NOTE — TELEPHONE ENCOUNTER
Thanks Neno!    I'll get in touch with the [facility] with this info.    Apparently Ray already has had dose increases, I'll take over from here.    Faith James  Prescription Assistance Supervisor  Pharmacy Assistance

## 2024-07-30 NOTE — TELEPHONE ENCOUNTER
"Eric has not been contacted to  his Ozempic delivery.    This was delivered to the [facility] on July 11, 2024.Please call Eric to pick this up.    See Neno's encounter dated July 29, 2024 with delivery information.    \"Paulette ADVIZE and Rep Trevino gave me a [employer] Shipping number [id number] and told me Ozempic was delivered on 7/11/24 at 10:07AM and received by \"Lionle\" at the [address] [place] [address] address \"    I will be handling Rays  assistance programs going forward.    Please inform me of any dose changes for Ozempic. I will begin the process to apply for his Jardiance.    Thanks so much for your help!  Thank you Neno!    Faith James  Prescription Assistance Supervisor  Pharmacy Assistance   "

## 2024-07-30 NOTE — TELEPHONE ENCOUNTER
I LM on 07/11 but I spoke to him today and he will pick it up today.    Kimberlee Poole [facility]  [phone]

## 2024-08-07 ENCOUNTER — DOCUMENTATION ONLY (OUTPATIENT)
Dept: LAB | Facility: CLINIC | Age: 69
End: 2024-08-07
Payer: COMMERCIAL

## 2024-08-07 NOTE — PROGRESS NOTES
Francois Fontana has an upcoming lab appointment:    Future Appointments   Date Time Provider Department Center   8/21/2024 10:15 AM [facility] LAB [facility]   8/28/2024  1:30 PM Maryam Doshi MD [facility]   12/30/2024 11:00 AM Delano Murray MD [facility]         There is no order available. Please review and place either future orders or HMPO (Review of Health Maintenance Protocol Orders), as appropriate.    Health Maintenance Due   Topic    ANNUAL REVIEW OF HM ORDERS     SYMONE Melchor

## 2024-08-07 NOTE — PROGRESS NOTES
Labs completed 6/17/24. Attempted to call pt to notify that lab appt can be cancelled but mailbox is full.

## 2024-08-12 ENCOUNTER — TELEPHONE (OUTPATIENT)
Dept: ENDOCRINOLOGY | Facility: CLINIC | Age: 69
End: 2024-08-12
Payer: COMMERCIAL

## 2024-08-12 NOTE — TELEPHONE ENCOUNTER
Aug 12, 2024 I have interoffice mailed to the [facility] for Eric Brasher's Jardiance assistance application. He was seen at the [facility].    Please review, complete, sign and return to me, I have included an addressed return interoffice envelope.    I have sent to Eric his section of this application.    Thanks so much for your help!    Faith James  Prescription Assistance Supervisor  Pharmacy Assistance

## 2024-08-15 NOTE — TELEPHONE ENCOUNTER
Form received from [facility] Patient Assistance Program Application for Jardiance.    Form placed in provider's in box.    Kimberlee Poole [facility]  [phone]

## 2024-08-19 ENCOUNTER — TELEPHONE (OUTPATIENT)
Dept: ENDOCRINOLOGY | Facility: CLINIC | Age: 69
End: 2024-08-19
Payer: COMMERCIAL

## 2024-08-19 DIAGNOSIS — E11.65 TYPE 2 DIABETES MELLITUS WITH HYPERGLYCEMIA, WITHOUT LONG-TERM CURRENT USE OF INSULIN (H): ICD-10-CM

## 2024-08-20 DIAGNOSIS — E11.21 TYPE 2 DIABETES MELLITUS WITH DIABETIC NEPHROPATHY, WITHOUT LONG-TERM CURRENT USE OF INSULIN (H): ICD-10-CM

## 2024-08-20 DIAGNOSIS — E11.65 TYPE 2 DIABETES MELLITUS WITH HYPERGLYCEMIA, WITHOUT LONG-TERM CURRENT USE OF INSULIN (H): ICD-10-CM

## 2024-08-20 NOTE — TELEPHONE ENCOUNTER
[facility] At this time there is 1 opening on 12.23.2024. Please schedule if still available.

## 2024-08-20 NOTE — TELEPHONE ENCOUNTER
Form was interofficed and sent to scanning.      Kimberlee Poole, HARLAN  [facility]  Gerardo/Ranjit

## 2024-08-20 NOTE — TELEPHONE ENCOUNTER
Requested Prescriptions   Pending Prescriptions Disp Refills    metFORMIN (GLUCOPHAGE) 1000 MG tablet [Pharmacy Med Name: METFORMIN HCL 1000MG TABS] 180 tablet 0     Sig: TAKE ONE TABLET BY MOUTH TWICE A DAY WITH MEALS       Biguanide Agents Passed - 8/20/2024  9:42 AM        Passed - Patient is age 10 or older        Passed - Patient has documented A1c within the specified period of time.     If HgbA1C is 8 or greater, it needs to be on file within the past 3 months.  If less than 8, must be on file within the past 6 months.     Recent Labs   Lab Test 06/17/24  1016   A1C 8.3*             Passed - Patient does NOT have a diagnosis of CHF.        Passed - Medication is active on med list        Passed - Medication indicated for associated diagnosis     Medication is associated with one or more of the following diagnoses:     Gestational diabetes mellitus     Hyperinsulinar obesity     Hypersecretion of ovarian androgens    Non-alcoholic fatty liver    Polycystic ovarian syndrome               Pre-diabetes (DM 2 prevention)    Type 2 diabetes mellitus     Weight gain, antipsychotic therapy-induced    Impaired fasting glucose          Passed - Has GFR on file in past 12 months and most recent value is normal        Passed - Recent (6 mo) or future (90 days) visit within the authorizing provider's specialty     The patient must have completed an in-person or virtual visit within the past 6 months or has a future visit scheduled within the next 90 days with the authorizing provider s specialty.  Urgent care and e-visits do not quality as an office visit for this protocol.

## 2024-08-22 DIAGNOSIS — E11.65 TYPE 2 DIABETES MELLITUS WITH HYPERGLYCEMIA, WITHOUT LONG-TERM CURRENT USE OF INSULIN (H): ICD-10-CM

## 2024-08-22 RX ORDER — PIOGLITAZONEHYDROCHLORIDE 45 MG/1
45 TABLET ORAL DAILY
Qty: 90 TABLET | Refills: 0 | Status: SHIPPED | OUTPATIENT
Start: 2024-08-22

## 2024-08-22 NOTE — TELEPHONE ENCOUNTER
Requested Prescriptions   Pending Prescriptions Disp Refills    pioglitazone (ACTOS) 45 MG tablet [Pharmacy Med Name: PIOGLITAZONE HCL 45MG TABS] 30 tablet 0     Sig: TAKE 1 TABLET (45 MG) BY MOUTH DAILY       Thiazolidinedione Agents (TZDs)  Passed - 8/22/2024  5:07 AM        Passed - Patient has a normal ALT within the past 12 mos.     Recent Labs   Lab Test 06/24/24  1142   ALT 24             Passed - Patient has a normal AST within the past 12 mos.      Recent Labs   Lab Test 06/24/24  1142   AST 22             Passed - Patient has documented A1c within the specified period of time.     If HgbA1C is 8 or greater, it needs to be on file within the past 3 months.  If less than 8, must be on file within the past 6 months.     Recent Labs   Lab Test 06/17/24  1016   A1C 8.3*             Passed - Diagnosis not CHF        Passed - Medication is active on med list        Passed - Has GFR on file in past 12 months and most recent value is normal        Passed - Recent (6 mo) or future (90 days) visit within the authorizing provider's specialty     The patient must have completed an in-person or virtual visit within the past 6 months or has a future visit scheduled within the next 90 days with the authorizing provider s specialty.  Urgent care and e-visits do not quality as an office visit for this protocol.          Passed - Medication indicated for associated diagnosis     Medication is associated with one or more of the following diagnoses:   - Type 2 diabetes mellitus          Passed - Patient is age 18 or older

## 2024-08-22 NOTE — TELEPHONE ENCOUNTER
Pending Prescriptions:                       Disp   Refills    metFORMIN (GLUCOPHAGE) 1000 MG tablet     180 ta*0            Sig: Take 1 tablet (1,000 mg) by mouth 2 times daily           (with meals).    Last filled 6-24-24      Patient says he was just seen and does not understand why he can't get refills    Thank you,  Mone Holt [facility]  [phone]

## 2024-08-22 NOTE — TELEPHONE ENCOUNTER
[facility] please help schedule into the open spot on 12/02 if still available

## 2024-09-04 NOTE — TELEPHONE ENCOUNTER
Hello,    It seems that the application for Ozempic through [employer] is going to  on 2024. Please renew patient's application.    Thank You!    Neno Valverde [facility] Liaison  [facility] Jerry [email or web address]  Phone: [phone]  Fax: [phone]

## 2024-09-20 DIAGNOSIS — E11.21 TYPE 2 DIABETES MELLITUS WITH DIABETIC NEPHROPATHY, WITHOUT LONG-TERM CURRENT USE OF INSULIN (H): ICD-10-CM

## 2024-09-20 NOTE — TELEPHONE ENCOUNTER
"Patient called and scheduled first available which is 03/18/2025. Writer stated that Ana Maria day was working him in to get him seen sooner in Nov and Dec and because he didn't answer or call us back, now we have to schedule first available. Patient stated \"maybe they can try again\". He prefers afternoon appts. Please call and advise.   "

## 2024-09-23 RX ORDER — EMPAGLIFLOZIN 25 MG/1
25 TABLET, FILM COATED ORAL DAILY
Qty: 30 TABLET | Refills: 2 | Status: SHIPPED | OUTPATIENT
Start: 2024-09-23 | End: 2024-09-26

## 2024-09-26 DIAGNOSIS — E11.21 TYPE 2 DIABETES MELLITUS WITH DIABETIC NEPHROPATHY, WITHOUT LONG-TERM CURRENT USE OF INSULIN (H): ICD-10-CM

## 2024-09-26 NOTE — TELEPHONE ENCOUNTER
Ana Maria Tovar printed and signed Hard copy of prescription for Jardiance 25 mg as LUPIS.       Placed in interoffice envelope and place in interoffice outgoing mail.       Lin Alvarez, HARLAN  Adult Endocrinology   [facility]

## 2024-09-26 NOTE — TELEPHONE ENCOUNTER
I am in process of applying to the Jardiance 25mg assistance program. [facility] requires a hand signed, brand name, hard copy script be submitted with their application.    Please hand sign a BRAND name, hard copy script for:    JARDIANCE 25mg    Please send the HARD COPY script to ME at--   I must submit this script with the assistance application.    via interoffice mail  FPS Faith Mancia     or via [place] mail  at:   [facility]  Faith James  [address] [place]  [address]    Thanks so much for your help!    Faith James  Prescription Assistance Supervisor  Pharmacy Assistance

## 2024-09-30 ENCOUNTER — TELEPHONE (OUTPATIENT)
Dept: ENDOCRINOLOGY | Facility: CLINIC | Age: 69
End: 2024-09-30
Payer: COMMERCIAL

## 2024-09-30 NOTE — TELEPHONE ENCOUNTER
Eric's Jardiance assistance application has been submitted to the [facility] assistance program.    We will note Epic when we receive a decision.    Thanks so much for your help!    Faith James  Prescription Assistance Supervisor  Pharmacy Assistance

## 2024-10-20 DIAGNOSIS — E11.21 TYPE 2 DIABETES MELLITUS WITH DIABETIC NEPHROPATHY, WITHOUT LONG-TERM CURRENT USE OF INSULIN (H): ICD-10-CM

## 2024-10-20 DIAGNOSIS — E11.65 TYPE 2 DIABETES MELLITUS WITH HYPERGLYCEMIA, WITHOUT LONG-TERM CURRENT USE OF INSULIN (H): ICD-10-CM

## 2024-10-21 RX ORDER — GLIPIZIDE 10 MG/1
20 TABLET, FILM COATED, EXTENDED RELEASE ORAL DAILY
Qty: 180 TABLET | Refills: 0 | Status: SHIPPED | OUTPATIENT
Start: 2024-10-21

## 2024-10-21 NOTE — TELEPHONE ENCOUNTER
Requested Prescriptions   Pending Prescriptions Disp Refills    glipiZIDE (GLUCOTROL XL) 10 MG 24 hr tablet [Pharmacy Med Name: GLIPIZIDE ER 10MG TB24] 180 tablet 0     Sig: TAKE TWO TABLETS BY MOUTH ONCE DAILY       Sulfonylurea Agents Failed - 10/21/2024  6:27 AM        Failed - Patient has documented A1c within the specified period of time.     If HgbA1C is 8 or greater, it needs to be on file within the past 3 months.  If less than 8, must be on file within the past 6 months.     Recent Labs   Lab Test 06/17/24  1016   A1C 8.3*             Failed - Patient has a recent creatinine (normal) within the past 12 mos.     Recent Labs   Lab Test 06/24/24  1142   CR 1.24*                 Passed - Medication is active on med list        Passed - Has GFR on file in past 12 months and most recent value is normal        Passed - Recent (6 mo) or future (90 days) visit within the authorizing provider's specialty     The patient must have completed an in-person or virtual visit within the past 6 months or has a future visit scheduled within the next 90 days with the authorizing provider s specialty.  Urgent care and e-visits do not quality as an office visit for this protocol.          Passed - Medication indicated for associated diagnosis     Medication is associated with one or more of the following diagnoses:  Maturity-onset diabetes of the young   Peripheral circulatory disorder due to type 2 diabetes mellitus   Type 2 diabetes mellitus           Passed - Patient is age 18 or older

## 2024-11-03 ENCOUNTER — HEALTH MAINTENANCE LETTER (OUTPATIENT)
Age: 69
End: 2024-11-03

## 2024-11-11 ENCOUNTER — TELEPHONE (OUTPATIENT)
Dept: ENDOCRINOLOGY | Facility: CLINIC | Age: 69
End: 2024-11-11
Payer: COMMERCIAL

## 2024-11-11 DIAGNOSIS — E11.21 TYPE 2 DIABETES MELLITUS WITH DIABETIC NEPHROPATHY, WITHOUT LONG-TERM CURRENT USE OF INSULIN (H): Primary | ICD-10-CM

## 2024-11-11 NOTE — TELEPHONE ENCOUNTER
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  Please reach out to patient once placed     Date needed: asap  Provider name: Guy    Action Taken: Other: endo    Travel Screening: Not Applicable     Date of Service:

## 2024-11-12 ENCOUNTER — TELEPHONE (OUTPATIENT)
Dept: ENDOCRINOLOGY | Facility: CLINIC | Age: 69
End: 2024-11-12

## 2024-11-12 ENCOUNTER — LAB (OUTPATIENT)
Dept: LAB | Facility: CLINIC | Age: 69
End: 2024-11-12
Payer: COMMERCIAL

## 2024-11-12 DIAGNOSIS — E11.65 TYPE 2 DIABETES MELLITUS WITH HYPERGLYCEMIA, WITHOUT LONG-TERM CURRENT USE OF INSULIN (H): ICD-10-CM

## 2024-11-12 DIAGNOSIS — E11.21 TYPE 2 DIABETES MELLITUS WITH DIABETIC NEPHROPATHY, WITHOUT LONG-TERM CURRENT USE OF INSULIN (H): ICD-10-CM

## 2024-11-12 LAB
EST. AVERAGE GLUCOSE BLD GHB EST-MCNC: 151 MG/DL
HBA1C MFR BLD: 6.9 % (ref 0–5.6)

## 2024-11-12 PROCEDURE — 36415 COLL VENOUS BLD VENIPUNCTURE: CPT

## 2024-11-12 PROCEDURE — 83036 HEMOGLOBIN GLYCOSYLATED A1C: CPT

## 2024-11-12 NOTE — TELEPHONE ENCOUNTER
November 12, 2024 I interoffice mailed to Ana Maria Tovar PAC, Jardiance and Ozempic assistance applications. Please review, complete, sign and return to us in the enclosed, addressed interoffice envelope.    Applications have been sent to Eric for review, signature and required documents.    Thanks so much for your help!    Faith James  Prescription Assistance Supervisor  Pharmacy Assistance   Pool: RxAssist

## 2024-11-14 NOTE — TELEPHONE ENCOUNTER
Fax received from [facility] for Jardiance.    Form placed in provider's in box.    Kimberlee Poole CMA  [facility] [place]  [phone]

## 2024-11-18 NOTE — TELEPHONE ENCOUNTER
Form was interofficed and sent to scanning.      Kimberlee Poole, HARLAN  [facility]  Gerardo/Ranjit

## 2024-11-19 ENCOUNTER — TELEPHONE (OUTPATIENT)
Dept: SLEEP MEDICINE | Facility: CLINIC | Age: 69
End: 2024-11-19
Payer: COMMERCIAL

## 2024-11-19 NOTE — TELEPHONE ENCOUNTER
General Call    Contacts       Contact Date/Time Type Contact Phone/Fax    11/19/2024 11:38 AM CST Phone (Incoming) Eric Fontana (Self) [phone] (M)          Reason for Call:  cpap     What are your questions or concerns:  pt needs help getting readings for his cpap through an ann, needs this prior to march     Date of last appointment with provider: 05/15/2024    Could we send this information to you in [employer] or would you prefer to receive a phone call?:   No preference   Okay to leave a detailed message?: Yes at Cell number on file:    Telephone Information:   Mobile [phone]

## 2024-11-20 ENCOUNTER — MYC REFILL (OUTPATIENT)
Dept: ENDOCRINOLOGY | Facility: CLINIC | Age: 69
End: 2024-11-20
Payer: COMMERCIAL

## 2024-11-20 DIAGNOSIS — E11.21 TYPE 2 DIABETES MELLITUS WITH DIABETIC NEPHROPATHY, WITHOUT LONG-TERM CURRENT USE OF INSULIN (H): ICD-10-CM

## 2024-11-20 DIAGNOSIS — E11.65 TYPE 2 DIABETES MELLITUS WITH HYPERGLYCEMIA, WITHOUT LONG-TERM CURRENT USE OF INSULIN (H): ICD-10-CM

## 2024-11-20 RX ORDER — LANCETS
EACH MISCELLANEOUS
Qty: 100 EACH | Refills: 0 | Status: SHIPPED | OUTPATIENT
Start: 2024-11-20

## 2024-11-20 RX ORDER — PIOGLITAZONE 45 MG/1
45 TABLET ORAL DAILY
Qty: 90 TABLET | Refills: 0 | Status: SHIPPED | OUTPATIENT
Start: 2024-11-20

## 2024-11-20 RX ORDER — GLIPIZIDE 10 MG/1
20 TABLET, FILM COATED, EXTENDED RELEASE ORAL DAILY
Qty: 180 TABLET | Refills: 0 | OUTPATIENT
Start: 2024-11-20

## 2024-11-20 NOTE — TELEPHONE ENCOUNTER
Requested Prescriptions   Pending Prescriptions Disp Refills    blood glucose monitoring (SOFTCLIX) lancets 100 each 0     Sig: Use to test blood sugar daily times daily or as directed.       Diabetic Supplies Protocol Passed - 11/20/2024 12:50 PM        Passed - Medication is active on med list        Passed - Recent (12 month) or future (90 days) visit with authorizing provider s specialty     The patient must have completed an in-person or virtual visit within the past 12 months or has a future visit scheduled within the next 90 days with the authorizing provider s specialty.  Urgent care and e-visits do not qualify as an office visit for this protocol.          Passed - Medication indicated for associated diagnosis        Passed - Patient is 18 years of age or older

## 2024-11-20 NOTE — TELEPHONE ENCOUNTER
Requested Prescriptions   Pending Prescriptions Disp Refills    pioglitazone (ACTOS) 45 MG tablet 90 tablet 0     Sig: Take 1 tablet (45 mg) by mouth daily.       Thiazolidinedione Agents (TZDs)  Passed - 11/20/2024  2:11 PM        Passed - Patient has a normal ALT within the past 12 mos.     Recent Labs   Lab Test 06/24/24  1142   ALT 24             Passed - Patient has a normal AST within the past 12 mos.      Recent Labs   Lab Test 06/24/24  1142   AST 22             Passed - Patient has documented A1c within the specified period of time.     If HgbA1C is 8 or greater, it needs to be on file within the past 3 months.  If less than 8, must be on file within the past 6 months.     Recent Labs   Lab Test 11/12/24  0954   A1C 6.9*             Passed - Diagnosis not CHF        Passed - Medication is active on med list        Passed - Has GFR on file in past 12 months and most recent value is normal        Passed - Recent (6 mo) or future (90 days) visit within the authorizing provider's specialty     The patient must have completed an in-person or virtual visit within the past 6 months or has a future visit scheduled within the next 90 days with the authorizing provider s specialty.  Urgent care and e-visits do not quality as an office visit for this protocol.          Passed - Medication indicated for associated diagnosis     Medication is associated with one or more of the following diagnoses:   - Type 2 diabetes mellitus          Passed - Patient is age 18 or older

## 2024-11-23 DIAGNOSIS — E11.65 TYPE 2 DIABETES MELLITUS WITH HYPERGLYCEMIA, WITHOUT LONG-TERM CURRENT USE OF INSULIN (H): ICD-10-CM

## 2024-11-23 NOTE — TELEPHONE ENCOUNTER
Hello,     Patient is requesting a new Accu-chek Guide meter/kit due to misplacing his lancet device.    Thank You,    Radha Welsh, [facility] ] Department   [facility]  [email or web address]

## 2024-12-18 DIAGNOSIS — I10 ESSENTIAL HYPERTENSION WITH GOAL BLOOD PRESSURE LESS THAN 140/90: ICD-10-CM

## 2024-12-18 RX ORDER — AMLODIPINE BESYLATE 10 MG/1
10 TABLET ORAL DAILY
Qty: 90 TABLET | Refills: 1 | Status: SHIPPED | OUTPATIENT
Start: 2024-12-18

## 2024-12-30 ENCOUNTER — OFFICE VISIT (OUTPATIENT)
Dept: INTERNAL MEDICINE | Facility: CLINIC | Age: 69
End: 2024-12-30
Payer: COMMERCIAL

## 2024-12-30 VITALS
DIASTOLIC BLOOD PRESSURE: 82 MMHG | TEMPERATURE: 97.7 F | BODY MASS INDEX: 37.8 KG/M2 | HEIGHT: 72 IN | OXYGEN SATURATION: 94 % | SYSTOLIC BLOOD PRESSURE: 136 MMHG | WEIGHT: 279.1 LBS | RESPIRATION RATE: 18 BRPM | HEART RATE: 101 BPM

## 2024-12-30 DIAGNOSIS — N18.31 STAGE 3A CHRONIC KIDNEY DISEASE (H): Primary | ICD-10-CM

## 2024-12-30 DIAGNOSIS — E66.01 MORBID OBESITY (H): ICD-10-CM

## 2024-12-30 DIAGNOSIS — E11.65 TYPE 2 DIABETES MELLITUS WITH HYPERGLYCEMIA, WITHOUT LONG-TERM CURRENT USE OF INSULIN (H): ICD-10-CM

## 2024-12-30 DIAGNOSIS — E78.5 HYPERLIPIDEMIA LDL GOAL <70: ICD-10-CM

## 2024-12-30 DIAGNOSIS — I10 ESSENTIAL HYPERTENSION WITH GOAL BLOOD PRESSURE LESS THAN 140/90: ICD-10-CM

## 2024-12-30 DIAGNOSIS — L71.9 ROSACEA: ICD-10-CM

## 2024-12-30 DIAGNOSIS — E11.21 TYPE 2 DIABETES MELLITUS WITH DIABETIC NEPHROPATHY, WITHOUT LONG-TERM CURRENT USE OF INSULIN (H): ICD-10-CM

## 2024-12-30 LAB
ALBUMIN SERPL BCG-MCNC: 3.9 G/DL (ref 3.5–5.2)
ALP SERPL-CCNC: 72 U/L (ref 40–150)
ALT SERPL W P-5'-P-CCNC: 32 U/L (ref 0–70)
ANION GAP SERPL CALCULATED.3IONS-SCNC: 10 MMOL/L (ref 7–15)
AST SERPL W P-5'-P-CCNC: 28 U/L (ref 0–45)
BILIRUB SERPL-MCNC: 0.3 MG/DL
BUN SERPL-MCNC: 19.1 MG/DL (ref 8–23)
CALCIUM SERPL-MCNC: 9.3 MG/DL (ref 8.8–10.4)
CHLORIDE SERPL-SCNC: 103 MMOL/L (ref 98–107)
CREAT SERPL-MCNC: 1.16 MG/DL (ref 0.67–1.17)
EGFRCR SERPLBLD CKD-EPI 2021: 69 ML/MIN/1.73M2
GLUCOSE SERPL-MCNC: 122 MG/DL (ref 70–99)
HCO3 SERPL-SCNC: 24 MMOL/L (ref 22–29)
POTASSIUM SERPL-SCNC: 4.9 MMOL/L (ref 3.4–5.3)
PROT SERPL-MCNC: 6.6 G/DL (ref 6.4–8.3)
SODIUM SERPL-SCNC: 137 MMOL/L (ref 135–145)

## 2024-12-30 PROCEDURE — G2211 COMPLEX E/M VISIT ADD ON: HCPCS | Performed by: INTERNAL MEDICINE

## 2024-12-30 PROCEDURE — 36415 COLL VENOUS BLD VENIPUNCTURE: CPT | Performed by: INTERNAL MEDICINE

## 2024-12-30 PROCEDURE — 80053 COMPREHEN METABOLIC PANEL: CPT | Performed by: INTERNAL MEDICINE

## 2024-12-30 PROCEDURE — 99214 OFFICE O/P EST MOD 30 MIN: CPT | Performed by: INTERNAL MEDICINE

## 2024-12-30 RX ORDER — GLIPIZIDE 10 MG/1
20 TABLET, FILM COATED, EXTENDED RELEASE ORAL DAILY
Qty: 180 TABLET | Refills: 0 | Status: SHIPPED | OUTPATIENT
Start: 2024-12-30

## 2024-12-30 RX ORDER — HYDROCHLOROTHIAZIDE 25 MG/1
25 TABLET ORAL DAILY
Qty: 90 TABLET | Refills: 1 | Status: SHIPPED | OUTPATIENT
Start: 2024-12-30

## 2024-12-30 RX ORDER — DOXYCYCLINE 100 MG/1
100 CAPSULE ORAL 2 TIMES DAILY
Qty: 180 CAPSULE | Refills: 0 | Status: SHIPPED | OUTPATIENT
Start: 2024-12-30

## 2024-12-30 RX ORDER — VALSARTAN 160 MG/1
160 TABLET ORAL DAILY
Qty: 90 TABLET | Refills: 1 | Status: SHIPPED | OUTPATIENT
Start: 2024-12-30

## 2024-12-30 RX ORDER — PIOGLITAZONE 45 MG/1
45 TABLET ORAL DAILY
Qty: 90 TABLET | Refills: 0 | OUTPATIENT
Start: 2024-12-30

## 2024-12-30 RX ORDER — SIMVASTATIN 20 MG
20 TABLET ORAL AT BEDTIME
Qty: 90 TABLET | Refills: 1 | Status: SHIPPED | OUTPATIENT
Start: 2024-12-30

## 2024-12-30 NOTE — PROGRESS NOTES
Assessment & Plan     (I10) Essential hypertension with goal blood pressure less than 140/90  Comment: BP stable  Plan: refilled hydrochlorothiazide (HYDRODIURIL) 25 MG tablet, valsartan (DIOVAN) 160 MG tablet, as directed.explained clearly about the medication,insructions and side effects.  Rpt Comprehensive  metabolic panel          (E78.5) Hyperlipidemia LDL goal <70  Plan: simvastatin (ZOCOR) 20 MG tablet refilled.explained clearly about the medication,insructions and side effects.            (N18.31) Stage 3a chronic kidney disease (H)    Plan: monitor Creatinine, avoid nephrotoxins, check comprehensive metabolic panel         (L71.9) Rosacea  Plan: doxycycline hyclate (VIBRAMYCIN) 100 MG capsule refilled as directed .explained clearly about the medication,insructions and side effects.            (E66.01) Morbid obesity (H)  Plan: Discussed in detail about Diet,calorie intake,and importance of regular exercise.    Follows up with Endocrinologist for diabetes .    BMI  Estimated body mass index is 37.85 kg/m  as calculated from the following:    Height as of this encounter: 1.829 m (6').    Weight as of this encounter: 126.6 kg (279 lb 1.6 oz).   Weight management plan: Discussed healthy diet and exercise guidelines      The longitudinal plan of care for the diagnosis(es)/condition(s) as documented were addressed during this visit. Due to the added complexity in care, I will continue to support Eric in the subsequent management and with ongoing continuity of care.    Subjective   Eric is a 69 year old, presenting for the following health issues:  Diabetes, Lipids, and Hypertension      12/30/2024    10:50 AM   Additional Questions   Roomed by norm   Accompanied by self         12/30/2024    10:50 AM   Patient Reported Additional Medications   Patient reports taking the following new medications none     HPI        Hyperlipidemia Follow-Up    Are you regularly taking any medication or supplement to lower your  cholesterol?   Yes- simvastatin  Are you having muscle aches or other side effects that you think could be caused by your cholesterol lowering medication?  No      Hypertension Follow-up    Do you check your blood pressure regularly outside of the clinic? No   Are you following a low salt diet? Yes  Are your blood pressures ever more than 140 on the top number (systolic) OR more   than 90 on the bottom number (diastolic), for example 140/90? Yes    BP Readings from Last 2 Encounters:   06/24/24 120/72   06/24/24 (!) 142/79     Hemoglobin A1C (%)   Date Value   11/12/2024 6.9 (H)   06/17/2024 8.3 (H)   09/13/2022 8.5 (A)   06/10/2021 8.6 (H)     LDL Cholesterol Calculated (mg/dL)   Date Value   06/24/2024 45   05/25/2023 43   07/22/2020 32   01/13/2017 28       Chronic Kidney Disease Follow-up  Do you take any over the counter pain medicine?: No      Rosacea follow up; on prn doxycyline and requesting refills      How many servings of fruits and vegetables do you eat daily?  2-3  On average, how many sweetened beverages do you drink each day (Examples: soda, juice, sweet tea, etc.  Do NOT count diet or artificially sweetened beverages)?   2  How many days per week do you exercise enough to make your heart beat faster? 3 or less  How many minutes a day do you exercise enough to make your heart beat faster? 9 or less  How many days per week do you miss taking your medication? 0      Past Medical History:   Diagnosis Date    Bilateral incipient cataracts 08/14/2020    Diabetes mellitus, type 2 (H)     Hyperlipidemia     Rosacea     Stenosis of left subclavian artery (H)     Type II or unspecified type diabetes mellitus without mention of complication, not stated as uncontrolled     Abstracted 07/18/02    Unspecified essential hypertension        Current Outpatient Medications   Medication Sig Dispense Refill    ACCU-CHEK GUIDE test strip USE TO TEST BLOOD SUGAR ONCE DAILY 100 strip 0    alcohol swab prep pads Use to  swab area of injection/jorge as directed. 100 each 3    amLODIPine (NORVASC) 10 MG tablet TAKE ONE TABLET BY MOUTH ONCE DAILY 90 tablet 1    blood glucose calibration (NO BRAND SPECIFIED) solution Use to calibrate blood glucose monitor as needed as directed. 1 each 0    blood glucose monitoring (NO BRAND SPECIFIED) meter device kit Use to test blood sugar 1 times daily. 1 kit 0    blood glucose monitoring (SOFTCLIX) lancets Use to test blood sugar daily times daily or as directed. 100 each 0    Continuous Blood Gluc  (FREESTYLE JANESSA 2 READER) KADY Use to read blood sugars as per 's instructions. 1 each 0    Continuous Glucose Sensor (FREESTYLE JANESSA 2 SENSOR) MISC CHANGE SENSOR EVERY 14 DAYS 6 each 0    doxycycline hyclate (VIBRAMYCIN) 100 MG capsule Take 1 capsule (100 mg) by mouth 2 times daily. 180 capsule 0    empagliflozin (JARDIANCE) 25 MG TABS tablet Take 1 tablet (25 mg) by mouth daily. 90 tablet 1    hydrochlorothiazide (HYDRODIURIL) 25 MG tablet Take 1 tablet (25 mg) by mouth daily. 90 tablet 1    insulin pen needle (B-D U/F) 31G X 5 MM miscellaneous USE 1 PEN NEEDLE DAILY OR AS DIRECTED. 100 each 0    metFORMIN (GLUCOPHAGE) 1000 MG tablet Take 1 tablet (1,000 mg) by mouth 2 times daily (with meals). 180 tablet 1    order for DME Patient Francois Fontana was set up at [facility] on September 23, 2015. Patient received a [employer] DreamStation Auto CPAP Auto. Pressures were set at Auto 6 - 12 cm H2O.   Patient s ramp is 5 cm H2O for 30 min and FLEX/EPR is A Flex.  Patient received a [employer] Mask name: Wisp  Nasal mask Size Small, Medium, Large, heated tubing and heated humidifier.  Patient is enrolled in the STM Program and does need to meet compliance. Patient has a follow up on 11/4/15 with Bennett Goltz, PA.     Myra Lim (entered by Nitin Wilson)      pioglitazone (ACTOS) 45 MG tablet Take 1 tablet (45 mg) by mouth daily. 90 tablet 0    simvastatin  (ZOCOR) 20 MG tablet Take 1 tablet (20 mg) by mouth at bedtime. 90 tablet 1    valsartan (DIOVAN) 160 MG tablet Take 1 tablet (160 mg) by mouth daily. 90 tablet 1    aspirin 81 MG EC tablet Take 81 mg by mouth daily (Patient not taking: Reported on 12/30/2024)      glipiZIDE (GLUCOTROL XL) 10 MG 24 hr tablet Take 2 tablets (20 mg) by mouth daily. 180 tablet 0    Semaglutide, 1 MG/DOSE, (OZEMPIC) 4 MG/3ML pen Inject 1 mg subcutaneously every 7 days. 3 mL 2           Review of Systems  CONSTITUTIONAL: NEGATIVE for fever, chills,   ENT/MOUTH: NEGATIVE for ear, mouth and throat problems  RESP: NEGATIVE for significant cough or SOB  CV: NEGATIVE for chest pain, palpitations or peripheral edema  NEURO: NEGATIVE for weakness, dizziness or paresthesias  PSYCHIATRIC: NEGATIVE for changes in mood or affect        Objective    /82   Pulse 101   Temp 97.7  F (36.5  C) (Tympanic)   Resp 18   Ht 1.829 m (6')   Wt 126.6 kg (279 lb 1.6 oz)   SpO2 94%   BMI 37.85 kg/m    Body mass index is 37.85 kg/m .  Physical Exam   GENERAL: alert and no distress  EYES: Eyes grossly normal to inspection, PERRL and conjunctivae and sclerae normal  RESP: lungs clear to auscultation - no rales, rhonchi or wheezes  CV: regular rate and rhythm, normal S1 S2,   MS: trace LE edema, no calf tenderness   NEURO: Normal strength and tone, mentation intact and speech normal          Signed Electronically by: Delano Murray MD

## 2024-12-30 NOTE — TELEPHONE ENCOUNTER
Requested Prescriptions   Pending Prescriptions Disp Refills    Semaglutide (1 MG/DOSE) (OZEMPIC) 4 MG/3ML pen 3 mL 2     Sig: Inject 1 mg subcutaneously every 7 days.       GLP-1 Agonists Protocol Failed - 12/30/2024 11:42 AM        Failed - Recent (6 mo) or future (90 days) visit within the authorizing provider's specialty     The patient must have completed an in-person or virtual visit within the past 6 months or has a future visit scheduled within the next 90 days with the authorizing provider s specialty.  Urgent care and e-visits do not quality as an office visit for this protocol.          Passed - HgbA1C in past 3 or 6 months     If HgbA1C is 8 or greater, it needs to be on file within the past 3 months.  If less than 8, must be on file within the past 6 months.     Recent Labs   Lab Test 11/12/24  0954   A1C 6.9*             Passed - Medication is active on med list        Passed - Has GFR on file in past 12 months and most recent value is normal        Passed - Medication indicated for associated diagnosis     Medication is associated with one or more of the following diagnoses:     Type 2 diabetes mellitus           Passed - Patient is age 18 or older          glipiZIDE (GLUCOTROL XL) 10 MG 24 hr tablet 180 tablet 0     Sig: Take 2 tablets (20 mg) by mouth daily.       Sulfonylurea Agents Failed - 12/30/2024 11:42 AM        Failed - Recent (6 mo) or future (90 days) visit within the authorizing provider's specialty     The patient must have completed an in-person or virtual visit within the past 6 months or has a future visit scheduled within the next 90 days with the authorizing provider s specialty.  Urgent care and e-visits do not quality as an office visit for this protocol.          Failed - Patient has a recent creatinine (normal) within the past 12 mos.     Recent Labs   Lab Test 06/24/24  1142   CR 1.24*                 Passed - Patient has documented A1c within the specified period of time.      If HgbA1C is 8 or greater, it needs to be on file within the past 3 months.  If less than 8, must be on file within the past 6 months.     Recent Labs   Lab Test 11/12/24  0954   A1C 6.9*             Passed - Medication is active on med list        Passed - Has GFR on file in past 12 months and most recent value is normal        Passed - Medication indicated for associated diagnosis     Medication is associated with one or more of the following diagnoses:  Maturity-onset diabetes of the young   Peripheral circulatory disorder due to type 2 diabetes mellitus   Type 2 diabetes mellitus           Passed - Patient is age 18 or older         Refused Prescriptions Disp Refills    pioglitazone (ACTOS) 45 MG tablet 90 tablet 0     Sig: Take 1 tablet (45 mg) by mouth daily.       Thiazolidinedione Agents (TZDs)  Failed - 12/30/2024 11:42 AM        Failed - Recent (6 mo) or future (90 days) visit within the authorizing provider's specialty     The patient must have completed an in-person or virtual visit within the past 6 months or has a future visit scheduled within the next 90 days with the authorizing provider s specialty.  Urgent care and e-visits do not quality as an office visit for this protocol.          Passed - Patient has a normal ALT within the past 12 mos.     Recent Labs   Lab Test 06/24/24  1142   ALT 24             Passed - Patient has a normal AST within the past 12 mos.      Recent Labs   Lab Test 06/24/24  1142   AST 22             Passed - Patient has documented A1c within the specified period of time.     If HgbA1C is 8 or greater, it needs to be on file within the past 3 months.  If less than 8, must be on file within the past 6 months.     Recent Labs   Lab Test 11/12/24  0954   A1C 6.9*             Passed - Diagnosis not CHF        Passed - Medication is active on med list        Passed - Has GFR on file in past 12 months and most recent value is normal        Passed - Medication indicated for  associated diagnosis     Medication is associated with one or more of the following diagnoses:   - Type 2 diabetes mellitus          Passed - Patient is age 18 or older

## 2024-12-30 NOTE — TELEPHONE ENCOUNTER
Patient saw Dr. Murray today and needs refills on:    Glipizide  Actos  Ozempic - ran out    He also said he can't afford Jardiance and hasn't been taking it.    Kimberlee Poole [facility] [place]  [phone]

## 2024-12-30 NOTE — NURSING NOTE
/82   Pulse 101   Temp 97.7  F (36.5  C) (Tympanic)   Resp 18   Ht 1.829 m (6')   Wt 126.6 kg (279 lb 1.6 oz)   SpO2 94%   BMI 37.85 kg/m

## 2025-01-11 DIAGNOSIS — E11.21 TYPE 2 DIABETES MELLITUS WITH DIABETIC NEPHROPATHY, WITHOUT LONG-TERM CURRENT USE OF INSULIN (H): ICD-10-CM

## 2025-01-14 ENCOUNTER — MYC REFILL (OUTPATIENT)
Dept: INTERNAL MEDICINE | Facility: CLINIC | Age: 70
End: 2025-01-14
Payer: COMMERCIAL

## 2025-01-14 ENCOUNTER — MYC REFILL (OUTPATIENT)
Dept: ENDOCRINOLOGY | Facility: CLINIC | Age: 70
End: 2025-01-14
Payer: COMMERCIAL

## 2025-01-14 DIAGNOSIS — E11.21 TYPE 2 DIABETES MELLITUS WITH DIABETIC NEPHROPATHY, WITHOUT LONG-TERM CURRENT USE OF INSULIN (H): ICD-10-CM

## 2025-01-14 DIAGNOSIS — E11.65 TYPE 2 DIABETES MELLITUS WITH HYPERGLYCEMIA, WITHOUT LONG-TERM CURRENT USE OF INSULIN (H): ICD-10-CM

## 2025-01-14 DIAGNOSIS — I10 ESSENTIAL HYPERTENSION WITH GOAL BLOOD PRESSURE LESS THAN 140/90: ICD-10-CM

## 2025-01-14 RX ORDER — HYDROCHLOROTHIAZIDE 25 MG/1
25 TABLET ORAL DAILY
Qty: 90 TABLET | Refills: 3 | Status: SHIPPED | OUTPATIENT
Start: 2025-01-14

## 2025-01-16 RX ORDER — EMPAGLIFLOZIN 25 MG/1
25 TABLET, FILM COATED ORAL DAILY
Qty: 30 TABLET | Refills: 2 | OUTPATIENT
Start: 2025-01-16

## 2025-01-27 DIAGNOSIS — E11.65 TYPE 2 DIABETES MELLITUS WITH HYPERGLYCEMIA, WITHOUT LONG-TERM CURRENT USE OF INSULIN (H): ICD-10-CM

## 2025-02-12 DIAGNOSIS — E11.21 TYPE 2 DIABETES MELLITUS WITH DIABETIC NEPHROPATHY, WITHOUT LONG-TERM CURRENT USE OF INSULIN (H): ICD-10-CM

## 2025-02-12 DIAGNOSIS — E11.65 TYPE 2 DIABETES MELLITUS WITH HYPERGLYCEMIA, WITHOUT LONG-TERM CURRENT USE OF INSULIN (H): ICD-10-CM

## 2025-02-13 RX ORDER — PIOGLITAZONE 45 MG/1
45 TABLET ORAL DAILY
Qty: 90 TABLET | Refills: 0 | Status: SHIPPED | OUTPATIENT
Start: 2025-02-13

## 2025-02-13 NOTE — TELEPHONE ENCOUNTER
Requested Prescriptions   Pending Prescriptions Disp Refills    pioglitazone (ACTOS) 45 MG tablet [Pharmacy Med Name: PIOGLITAZONE HCL 45MG TABS] 90 tablet 0     Sig: TAKE 1 TABLET (45 MG) BY MOUTH DAILY.       Thiazolidinedione Agents (TZDs)  Passed - 2/13/2025  8:29 AM        Passed - Patient has a normal ALT within the past 12 mos.     Recent Labs   Lab Test 12/30/24  1154   ALT 32             Passed - Patient has a normal AST within the past 12 mos.      Recent Labs   Lab Test 12/30/24  1154   AST 28             Passed - Patient has documented A1c within the specified period of time.     If HgbA1C is 8 or greater, it needs to be on file within the past 3 months.  If less than 8, must be on file within the past 6 months.     Recent Labs   Lab Test 11/12/24  0954   A1C 6.9*             Passed - Diagnosis not CHF        Passed - Medication is active on med list and the sig matches. RN to manually verify dose and sig if red X/fail.     If the protocol passes (green check), you do not need to verify med dose and sig.    A prescription matches if they are the same clinical intention.    For Example: once daily and every morning are the same.    For all fails (red x), verify dose and sig.    If the refill does match what is on file, the RN can still proceed to approve the refill request.     If they do not match, route to the appropriate provider.             Passed - Has GFR on file in past 12 months and most recent value is normal        Passed - Recent (6 mo) or future (90 days) visit within the authorizing provider's specialty     The patient must have completed an in-person or virtual visit within the past 6 months or has a future visit scheduled within the next 90 days with the authorizing provider s specialty.  Urgent care and e-visits do not quality as an office visit for this protocol.          Passed - Medication indicated for associated diagnosis     Medication is associated with one or more of the following  diagnoses:   - Type 2 diabetes mellitus          Passed - Patient is age 18 or older

## 2025-02-24 ENCOUNTER — TELEPHONE (OUTPATIENT)
Dept: ENDOCRINOLOGY | Facility: CLINIC | Age: 70
End: 2025-02-24
Payer: COMMERCIAL

## 2025-02-24 NOTE — TELEPHONE ENCOUNTER
February 24, 2025 I interoffice mailed to MARLEEN Silva, the Ozempic Provider section of the [employer] application.    Allow a few days for this to reach the [facility].    Please review, complete, sign and return to us in the enclosed, addressed interoffice envelope.    Thanks so much for your help!    Faith James  Prescription Assistance Supervisor  Pharmacy Assistance   Pool: RxAssist

## 2025-02-26 NOTE — TELEPHONE ENCOUNTER
Fax received from  Pharmacy for Ozempic AND Jardiance PA Program.    Form placed in provider's in box.    Kimberlee Poole [facility]  [phone]

## 2025-03-02 ENCOUNTER — HEALTH MAINTENANCE LETTER (OUTPATIENT)
Age: 70
End: 2025-03-02

## 2025-03-18 ENCOUNTER — OFFICE VISIT (OUTPATIENT)
Dept: ENDOCRINOLOGY | Facility: CLINIC | Age: 70
End: 2025-03-18
Payer: COMMERCIAL

## 2025-03-18 VITALS
HEIGHT: 72 IN | DIASTOLIC BLOOD PRESSURE: 73 MMHG | OXYGEN SATURATION: 97 % | TEMPERATURE: 96.9 F | HEART RATE: 102 BPM | RESPIRATION RATE: 18 BRPM | SYSTOLIC BLOOD PRESSURE: 131 MMHG | WEIGHT: 270.5 LBS | BODY MASS INDEX: 36.64 KG/M2

## 2025-03-18 DIAGNOSIS — I10 ESSENTIAL HYPERTENSION WITH GOAL BLOOD PRESSURE LESS THAN 140/90: ICD-10-CM

## 2025-03-18 DIAGNOSIS — E11.65 TYPE 2 DIABETES MELLITUS WITH HYPERGLYCEMIA, WITHOUT LONG-TERM CURRENT USE OF INSULIN (H): Primary | ICD-10-CM

## 2025-03-18 DIAGNOSIS — E11.21 TYPE 2 DIABETES MELLITUS WITH DIABETIC NEPHROPATHY, WITHOUT LONG-TERM CURRENT USE OF INSULIN (H): ICD-10-CM

## 2025-03-18 DIAGNOSIS — N18.31 STAGE 3A CHRONIC KIDNEY DISEASE (H): ICD-10-CM

## 2025-03-18 DIAGNOSIS — E66.01 MORBID OBESITY (H): ICD-10-CM

## 2025-03-18 PROCEDURE — 3078F DIAST BP <80 MM HG: CPT | Performed by: PHYSICIAN ASSISTANT

## 2025-03-18 PROCEDURE — 3075F SYST BP GE 130 - 139MM HG: CPT | Performed by: PHYSICIAN ASSISTANT

## 2025-03-18 PROCEDURE — 99214 OFFICE O/P EST MOD 30 MIN: CPT | Performed by: PHYSICIAN ASSISTANT

## 2025-03-18 RX ORDER — AMLODIPINE BESYLATE 10 MG/1
10 TABLET ORAL DAILY
Qty: 90 TABLET | Refills: 1 | Status: SHIPPED | OUTPATIENT
Start: 2025-03-18

## 2025-03-18 NOTE — LETTER
3/18/2025      Francois Fontana  [address] [facility] [address]      Dear Colleague,    Thank you for referring your patient, Francois Fontana, to the [facility]. Please see a copy of my visit note below.    Assessment/Plan :   Type 2 DM. Eric is doing well. He continues to take his medication daily, as directed. He has not had any issues with medication adverse effects. He forgot his Tre sensor, so we were unable to review data but we did discuss his recent laboratory results. His hemoglobin A1C has improved significantly from previous. We will continue with his current medications and I encouraged him to keep up the good work with his diet and exercise. I will place a referral to an ophthalmologist and I also gave him the number to call for scheduling. We will follow-up in 6 mos.       I have independently reviewed and interpreted labs, imaging as indicated.      Chief complaint:  Francois is a 69 year old male who returns for follow-up of type 2 diabetes.    I have reviewed Care Everywhere including [facility], [facility], [facility], [facility] , [facility], [place] lab reports, imaging reports and provider notes as indicated.      HISTORY OF PRESENT ILLNESS  Eric feels like things are going well. 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 He continues to take Ozempic 1 mg weekly along with Jardiance 25 mg daily, metformin 2000 mg daily, and pioglitazone 45 mg daily. He has also been trying to set time aside to exercise every day. He will get on his [employer] exercise machine and go as hard as possible for about 15 min and then rest for 15 min. He does this a few times every day. He has also been trying to make better dietary decisions. He has been able to lose some weight and his blood sugars have continued to improve.    Eric has continued to monitor his blood sugars with the FreeStyle Tre 2 sensor. He has not had any problems with  severe hyperglycemia and/or hypoglycemia. He does like using the sensor. It makes it easy to keep a close eye on his numbers while he is driving. Since our last visit he has also not had any issues with his vision or worsening numbness/tingling in his feet. He is due for his routine eye exam and he would like us to place a referral.     Eric has had diabetes since 1990. 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 He has taken a variety of oral medications over the years. He feels like he had much better control over his blood sugars before [facility]. Since being home, he gets bored and tends to snack. He has no previous hospitalizations for DKA or hypoglycemia. However, he states that he feels like his blood sugars are either too high or too low, consistently. His diabetes is complicated by obesity, hyperlipidemia, HTN, stage 3 CKD, and JUSTIN.     Endocrine relevant labs are as follows:   Latest Reference Range & Units 11/12/24 09:54   Hemoglobin A1C 0.0 - 5.6 % 6.9 (H)   (H): Data is abnormally high   Latest Reference Range & Units 06/17/24 10:16   Hemoglobin A1C 0.0 - 5.6 % 8.3 (H)   (H): Data is abnormally high   Latest Reference Range & Units 06/17/24 10:21   Albumin Urine mg/g Cr 0.00 - 17.00 mg/g Cr 202.23 (H)   (H): Data is abnormally high   Latest Reference Range & Units 06/17/24 10:21   Albumin Urine mg/L mg/L 163.0     REVIEW OF SYSTEMS    Endocrine: positive for diabetes  Skin: negative  Eyes: negative for, visual blurring, redness, tearing  Ears/Nose/Throat: negative  Respiratory: No shortness of breath, dyspnea on exertion, cough, or hemoptysis  Cardiovascular: negative for, chest pain, dyspnea on exertion, lower extremity edema, and exercise intolerance  Gastrointestinal: negative for, nausea, vomiting, constipation, and diarrhea  Genitourinary: negative for, nocturia, dysuria, frequency, and urgency  Musculoskeletal: negative for, muscular weakness,  nocturnal cramping, and foot pain  Neurologic: negative for, local weakness, numbness or tingling of hands, and numbness or tingling of feet  Psychiatric: negative  Hematologic/Lymphatic/Immunologic: negative    Past Medical History  Past Medical History:   Diagnosis Date     Bilateral incipient cataracts 08/14/2020     Diabetes mellitus, type 2 (H)      Hyperlipidemia      Rosacea      Stenosis of left subclavian artery      Type II or unspecified type diabetes mellitus without mention of complication, not stated as uncontrolled     Abstracted 07/18/02     Unspecified essential hypertension        Medications  Current Outpatient Medications   Medication Sig Dispense Refill     ACCU-CHEK GUIDE test strip USE TO TEST BLOOD SUGAR ONCE DAILY 100 strip 0     alcohol swab prep pads Use to swab area of injection/jorge as directed. 100 each 3     amLODIPine (NORVASC) 10 MG tablet TAKE ONE TABLET BY MOUTH ONCE DAILY 90 tablet 1     aspirin 81 MG EC tablet Take 81 mg by mouth daily (Patient not taking: Reported on 12/30/2024)       blood glucose calibration (NO BRAND SPECIFIED) solution Use to calibrate blood glucose monitor as needed as directed. 1 each 0     blood glucose monitoring (NO BRAND SPECIFIED) meter device kit Use to test blood sugar 1 times daily. 1 kit 0     blood glucose monitoring (SOFTCLIX) lancets Use to test blood sugar daily times daily or as directed. 100 each 0     Continuous Blood Gluc  (FREESTYLE JANESSA 2 READER) KADY Use to read blood sugars as per 's instructions. 1 each 0     Continuous Glucose Sensor (FREESTYLE JANESSA 2 SENSOR) MISC CHANGE SENSOR EVERY 14 DAYS 6 each 0     doxycycline hyclate (VIBRAMYCIN) 100 MG capsule Take 1 capsule (100 mg) by mouth 2 times daily. 180 capsule 0     empagliflozin (JARDIANCE) 25 MG TABS tablet Take 1 tablet (25 mg) by mouth daily. 90 tablet 1     glipiZIDE (GLUCOTROL XL) 10 MG 24 hr tablet Take 2 tablets (20 mg) by mouth daily. 180 tablet 0      hydrochlorothiazide (HYDRODIURIL) 25 MG tablet Take 1 tablet (25 mg) by mouth daily. 90 tablet 3     insulin pen needle (B-D U/F) 31G X 5 MM miscellaneous USE 1 PEN NEEDLE DAILY OR AS DIRECTED. 100 each 0     metFORMIN (GLUCOPHAGE) 1000 MG tablet Take 1 tablet (1,000 mg) by mouth 2 times daily (with meals). 180 tablet 1     order for DME Patient Francois Fontana was set up at [place] on 2015. Patient received a Alayna [employer] DreamStation Auto CPAP Auto. Pressures were set at Auto 6 - 12 cm H2O.   Patient s ramp is 5 cm H2O for 30 min and FLEX/EPR is A Flex.  Patient received a Alayna Respironics Mask name: Wisp  Nasal mask Size Small, Medium, Large, heated tubing and heated humidifier.  Patient is enrolled in the STM Program and does need to meet compliance. Patient has a follow up on 11/4/15 with Bennett Goltz, PA.     Myra Lim (entered by Nitin Wilson)       pioglitazone (ACTOS) 45 MG tablet TAKE 1 TABLET (45 MG) BY MOUTH DAILY. 90 tablet 0     Semaglutide, 1 MG/DOSE, (OZEMPIC) 4 MG/3ML pen Inject 1 mg subcutaneously every 7 days. 3 mL 2     simvastatin (ZOCOR) 20 MG tablet Take 1 tablet (20 mg) by mouth at bedtime. 90 tablet 1     valsartan (DIOVAN) 160 MG tablet Take 1 tablet (160 mg) by mouth daily. 90 tablet 1       Allergies  No Known Allergies      Family History  family history includes C.A.D. in his father; Cancer in his father; Diabetes Type 2  in his father; Gastrointestinal Disease in his brother, brother, and brother; Prostate Cancer in his father; Respiratory in his mother.    Social History  Social History     Tobacco Use     Smoking status: Former     Current packs/day: 0.00     Types: Cigarettes     Quit date: 1985     Years since quittin.6     Smokeless tobacco: Never     Tobacco comments:     quit    Vaping Use     Vaping status: Never Used   Substance Use Topics     Alcohol use: No     Alcohol/week: 0.0 standard drinks of alcohol     Comment: quit  "drinking age 30     Drug use: No       Physical Exam  /73 (BP Location: Left arm, Patient Position: Chair, Cuff Size: Adult Large)   Pulse 102   Temp 96.9  F (36.1  C) (Tympanic)   Resp 18   Ht 1.829 m (6' 0.01\")   Wt 122.7 kg (270 lb 8 oz)   SpO2 97%   BMI 36.68 kg/m    Body mass index is 36.68 kg/m .  GENERAL :  In no apparent distress  SKIN: Normal color, normal temperature, texture.  No hirsutism, alopecia or purple striae.     EYES: PERRL, EOMI, No scleral icterus,  No proptosis, conjunctival redness, stare, retraction  RESP: Lungs clear to auscultation bilaterally  CARDIAC: Regular rate and rhythm, normal S1 S2, without murmurs, rubs or gallops    NEURO: awake, alert, responds appropriately to questions.  Cranial nerves intact.   Moves all extremities; Gait normal.  No tremor of the outstretched hand.    EXTREMITIES: No clubbing, cyanosis or edema.    DATA REVIEW  Forgot his Tre sensor so we are unable to upload        Again, thank you for allowing me to participate in the care of your patient.        Sincerely,        Ana Maria Tovar PA-C    Electronically signed"

## 2025-03-18 NOTE — PROGRESS NOTES
Assessment/Plan :   Type 2 DM. Eric is doing well. He continues to take his medication daily, as directed. He has not had any issues with medication adverse effects. He forgot his Tre sensor, so we were unable to review data but we did discuss his recent laboratory results. His hemoglobin A1C has improved significantly from previous. We will continue with his current medications and I encouraged him to keep up the good work with his diet and exercise. I will place a referral to an ophthalmologist and I also gave him the number to call for scheduling. We will follow-up in 6 mos.   HTN. His blood pressure is very stable today. He is in need of a refill for the amlodipine. I will send in a refill today.       I have independently reviewed and interpreted labs, imaging as indicated.      Chief complaint:  Francois is a 69 year old male who returns for follow-up of type 2 diabetes.    I have reviewed Care Everywhere including [facility], [facility], [facility], [place], [facility], [facility] , [facility], [place] lab reports, imaging reports and provider notes as indicated.      HISTORY OF PRESENT ILLNESS  Eric feels like things are going well. 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 He continues to take Ozempic 1 mg weekly along with Jardiance 25 mg daily, metformin 2000 mg daily, and pioglitazone 45 mg daily. He has also been trying to set time aside to exercise every day. He will get on his [employer] exercise machine and go as hard as possible for about 15 min and then rest for 15 min. He does this a few times every day. He has also been trying to make better dietary decisions. He has been able to lose some weight and his blood sugars have continued to improve.    Eric has continued to monitor his blood sugars with the FreeStyle Tre 2 sensor. He has not had any problems with severe hyperglycemia and/or hypoglycemia. He does like using the sensor. It makes it easy to keep a close eye on his numbers  while he is driving. Since our last visit he has also not had any issues with his vision or worsening numbness/tingling in his feet. He is due for his routine eye exam and he would like us to place a referral.     Eric has had diabetes since 1990. 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 He has taken a variety of oral medications over the years. He feels like he had much better control over his blood sugars before custodial. Since being home, he gets bored and tends to snack. He has no previous hospitalizations for DKA or hypoglycemia. However, he states that he feels like his blood sugars are either too high or too low, consistently. His diabetes is complicated by obesity, hyperlipidemia, HTN, stage 3 CKD, and JUSTIN.     Endocrine relevant labs are as follows:   Latest Reference Range & Units 11/12/24 09:54   Hemoglobin A1C 0.0 - 5.6 % 6.9 (H)   (H): Data is abnormally high   Latest Reference Range & Units 06/17/24 10:16   Hemoglobin A1C 0.0 - 5.6 % 8.3 (H)   (H): Data is abnormally high   Latest Reference Range & Units 06/17/24 10:21   Albumin Urine mg/g Cr 0.00 - 17.00 mg/g Cr 202.23 (H)   (H): Data is abnormally high   Latest Reference Range & Units 06/17/24 10:21   Albumin Urine mg/L mg/L 163.0     REVIEW OF SYSTEMS    Endocrine: positive for diabetes  Skin: negative  Eyes: negative for, visual blurring, redness, tearing  Ears/Nose/Throat: negative  Respiratory: No shortness of breath, dyspnea on exertion, cough, or hemoptysis  Cardiovascular: negative for, chest pain, dyspnea on exertion, lower extremity edema, and exercise intolerance  Gastrointestinal: negative for, nausea, vomiting, constipation, and diarrhea  Genitourinary: negative for, nocturia, dysuria, frequency, and urgency  Musculoskeletal: negative for, muscular weakness, nocturnal cramping, and foot pain  Neurologic: negative for, local weakness, numbness or tingling of hands, and numbness or  tingling of feet  Psychiatric: negative  Hematologic/Lymphatic/Immunologic: negative    Past Medical History  Past Medical History:   Diagnosis Date    Bilateral incipient cataracts 08/14/2020    Diabetes mellitus, type 2 (H)     Hyperlipidemia     Rosacea     Stenosis of left subclavian artery     Type II or unspecified type diabetes mellitus without mention of complication, not stated as uncontrolled     Abstracted 07/18/02    Unspecified essential hypertension        Medications  Current Outpatient Medications   Medication Sig Dispense Refill    ACCU-CHEK GUIDE test strip USE TO TEST BLOOD SUGAR ONCE DAILY 100 strip 0    alcohol swab prep pads Use to swab area of injection/jorge as directed. 100 each 3    amLODIPine (NORVASC) 10 MG tablet TAKE ONE TABLET BY MOUTH ONCE DAILY 90 tablet 1    aspirin 81 MG EC tablet Take 81 mg by mouth daily (Patient not taking: Reported on 12/30/2024)      blood glucose calibration (NO BRAND SPECIFIED) solution Use to calibrate blood glucose monitor as needed as directed. 1 each 0    blood glucose monitoring (NO BRAND SPECIFIED) meter device kit Use to test blood sugar 1 times daily. 1 kit 0    blood glucose monitoring (SOFTCLIX) lancets Use to test blood sugar daily times daily or as directed. 100 each 0    Continuous Blood Gluc  (FREESTYLE JANESSA 2 READER) KADY Use to read blood sugars as per 's instructions. 1 each 0    Continuous Glucose Sensor (FREESTYLE JANESSA 2 SENSOR) MISC CHANGE SENSOR EVERY 14 DAYS 6 each 0    doxycycline hyclate (VIBRAMYCIN) 100 MG capsule Take 1 capsule (100 mg) by mouth 2 times daily. 180 capsule 0    empagliflozin (JARDIANCE) 25 MG TABS tablet Take 1 tablet (25 mg) by mouth daily. 90 tablet 1    glipiZIDE (GLUCOTROL XL) 10 MG 24 hr tablet Take 2 tablets (20 mg) by mouth daily. 180 tablet 0    hydrochlorothiazide (HYDRODIURIL) 25 MG tablet Take 1 tablet (25 mg) by mouth daily. 90 tablet 3    insulin pen needle (B-D U/F) 31G X 5 MM  miscellaneous USE 1 PEN NEEDLE DAILY OR AS DIRECTED. 100 each 0    metFORMIN (GLUCOPHAGE) 1000 MG tablet Take 1 tablet (1,000 mg) by mouth 2 times daily (with meals). 180 tablet 1    order for DME Patient Francois Fontana was set up at [place] on 2015. Patient received a Alayna Respironics DreamStation Auto CPAP Auto. Pressures were set at Auto 6 - 12 cm H2O.   Patient s ramp is 5 cm H2O for 30 min and FLEX/EPR is A Flex.  Patient received a Alayna Respironics Mask name: Wisp  Nasal mask Size Small, Medium, Large, heated tubing and heated humidifier.  Patient is enrolled in the STM Program and does need to meet compliance. Patient has a follow up on 11/4/15 with Bennett Goltz, PA.     Myra Lim (entered by Nitin Wilson)      pioglitazone (ACTOS) 45 MG tablet TAKE 1 TABLET (45 MG) BY MOUTH DAILY. 90 tablet 0    Semaglutide, 1 MG/DOSE, (OZEMPIC) 4 MG/3ML pen Inject 1 mg subcutaneously every 7 days. 3 mL 2    simvastatin (ZOCOR) 20 MG tablet Take 1 tablet (20 mg) by mouth at bedtime. 90 tablet 1    valsartan (DIOVAN) 160 MG tablet Take 1 tablet (160 mg) by mouth daily. 90 tablet 1       Allergies  No Known Allergies      Family History  family history includes C.A.D. in his father; Cancer in his father; Diabetes Type 2  in his father; Gastrointestinal Disease in his brother, brother, and brother; Prostate Cancer in his father; Respiratory in his mother.    Social History  Social History     Tobacco Use    Smoking status: Former     Current packs/day: 0.00     Types: Cigarettes     Quit date: 1985     Years since quittin.6    Smokeless tobacco: Never    Tobacco comments:     quit    Vaping Use    Vaping status: Never Used   Substance Use Topics    Alcohol use: No     Alcohol/week: 0.0 standard drinks of alcohol     Comment: quit drinking age 30    Drug use: No       Physical Exam  /73 (BP Location: Left arm, Patient Position: Chair, Cuff Size: Adult Large)   Pulse 102    "Temp 96.9  F (36.1  C) (Tympanic)   Resp 18   Ht 1.829 m (6' 0.01\")   Wt 122.7 kg (270 lb 8 oz)   SpO2 97%   BMI 36.68 kg/m    Body mass index is 36.68 kg/m .  GENERAL :  In no apparent distress  SKIN: Normal color, normal temperature, texture.  No hirsutism, alopecia or purple striae.     EYES: PERRL, EOMI, No scleral icterus,  No proptosis, conjunctival redness, stare, retraction  RESP: Lungs clear to auscultation bilaterally  CARDIAC: Regular rate and rhythm, normal S1 S2, without murmurs, rubs or gallops    NEURO: awake, alert, responds appropriately to questions.  Cranial nerves intact.   Moves all extremities; Gait normal.  No tremor of the outstretched hand.    EXTREMITIES: No clubbing, cyanosis or edema.    DATA REVIEW  Forgot his [facility] sensor so we are unable to upload      "

## 2025-03-18 NOTE — PATIENT INSTRUCTIONS
[facility]  Dr Doshi, Endocrinology Department    [address] [id number]  [place] [address]  Appointment [phone]  Fax: [phone]  [place]: Monday - Thursday

## 2025-04-04 ENCOUNTER — TELEPHONE (OUTPATIENT)
Dept: ENDOCRINOLOGY | Facility: CLINIC | Age: 70
End: 2025-04-04
Payer: COMMERCIAL

## 2025-04-04 NOTE — TELEPHONE ENCOUNTER
[facility] Call Center    Phone Message    May a detailed message be left on voicemail: yes     Reason for Call: Other: .       Patient states he had a letter from Patient Assistance program about the medication Ozempic. Patient states he is supposed to be picking up the medication. Patient is wanting to know if the clinic has received the medication. Patient is wanting to get a call back to further discuss. Please advise.       Action Taken: Message routed to:  [facility] Endo    Travel Screening: Not Applicable     Date of Service:

## 2025-04-07 NOTE — TELEPHONE ENCOUNTER
SULY stating he can  his medication at any time at the [facility].    Kimberlee Poole [facility]  [phone]

## 2025-04-30 DIAGNOSIS — E11.21 TYPE 2 DIABETES MELLITUS WITH DIABETIC NEPHROPATHY, WITHOUT LONG-TERM CURRENT USE OF INSULIN (H): ICD-10-CM

## 2025-04-30 DIAGNOSIS — E11.65 TYPE 2 DIABETES MELLITUS WITH HYPERGLYCEMIA, WITHOUT LONG-TERM CURRENT USE OF INSULIN (H): ICD-10-CM

## 2025-04-30 RX ORDER — GLIPIZIDE 10 MG/1
20 TABLET, FILM COATED, EXTENDED RELEASE ORAL DAILY
Qty: 180 TABLET | Refills: 0 | Status: SHIPPED | OUTPATIENT
Start: 2025-04-30

## 2025-05-07 DIAGNOSIS — E11.65 TYPE 2 DIABETES MELLITUS WITH HYPERGLYCEMIA, WITHOUT LONG-TERM CURRENT USE OF INSULIN (H): ICD-10-CM

## 2025-05-21 ENCOUNTER — OFFICE VISIT (OUTPATIENT)
Dept: SLEEP MEDICINE | Facility: CLINIC | Age: 70
End: 2025-05-21
Payer: COMMERCIAL

## 2025-05-21 VITALS
BODY MASS INDEX: 36.44 KG/M2 | WEIGHT: 269 LBS | OXYGEN SATURATION: 96 % | DIASTOLIC BLOOD PRESSURE: 69 MMHG | SYSTOLIC BLOOD PRESSURE: 129 MMHG | HEART RATE: 93 BPM | HEIGHT: 72 IN

## 2025-05-21 DIAGNOSIS — G47.33 OSA (OBSTRUCTIVE SLEEP APNEA): Primary | ICD-10-CM

## 2025-05-21 PROCEDURE — 3074F SYST BP LT 130 MM HG: CPT | Performed by: PHYSICIAN ASSISTANT

## 2025-05-21 PROCEDURE — 3078F DIAST BP <80 MM HG: CPT | Performed by: PHYSICIAN ASSISTANT

## 2025-05-21 PROCEDURE — 1126F AMNT PAIN NOTED NONE PRSNT: CPT | Performed by: PHYSICIAN ASSISTANT

## 2025-05-21 PROCEDURE — 99213 OFFICE O/P EST LOW 20 MIN: CPT | Performed by: PHYSICIAN ASSISTANT

## 2025-05-21 ASSESSMENT — SLEEP AND FATIGUE QUESTIONNAIRES
HOW LIKELY ARE YOU TO NOD OFF OR FALL ASLEEP WHILE LYING DOWN TO REST IN THE AFTERNOON WHEN CIRCUMSTANCES PERMIT: SLIGHT CHANCE OF DOZING
HOW LIKELY ARE YOU TO NOD OFF OR FALL ASLEEP WHILE SITTING INACTIVE IN A PUBLIC PLACE: WOULD NEVER DOZE
HOW LIKELY ARE YOU TO NOD OFF OR FALL ASLEEP WHEN YOU ARE A PASSENGER IN A CAR FOR AN HOUR WITHOUT A BREAK: WOULD NEVER DOZE
HOW LIKELY ARE YOU TO NOD OFF OR FALL ASLEEP WHILE WATCHING TV: MODERATE CHANCE OF DOZING
HOW LIKELY ARE YOU TO NOD OFF OR FALL ASLEEP WHILE SITTING AND READING: MODERATE CHANCE OF DOZING
HOW LIKELY ARE YOU TO NOD OFF OR FALL ASLEEP WHILE SITTING QUIETLY AFTER LUNCH WITHOUT ALCOHOL: SLIGHT CHANCE OF DOZING
HOW LIKELY ARE YOU TO NOD OFF OR FALL ASLEEP WHILE SITTING AND TALKING TO SOMEONE: WOULD NEVER DOZE
HOW LIKELY ARE YOU TO NOD OFF OR FALL ASLEEP IN A CAR, WHILE STOPPED FOR A FEW MINUTES IN TRAFFIC: WOULD NEVER DOZE

## 2025-05-21 NOTE — NURSING NOTE
Chief Complaint   Patient presents with    CPAP Follow Up     Follow up harini        Initial /69   Pulse 93   Ht 1.829 m (6')   Wt 122 kg (269 lb)   SpO2 96%   BMI 36.48 kg/m   Estimated body mass index is 36.48 kg/m  as calculated from the following:    Height as of this encounter: 1.829 m (6').    Weight as of this encounter: 122 kg (269 lb).    Medication Reconciliation: complete    DME: orin

## 2025-05-21 NOTE — Clinical Note
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

## 2025-05-21 NOTE — PROGRESS NOTES
[facility]   Outpatient Sleep Medicine  May 21, 2025       Name: Francois Fontana MRN# [id number]   Age: 69 year old YOB: 1955            Assessment and Plan:   1. JUSTIN (obstructive sleep apnea) (Primary)    Patient's sleep apnea is adequately managed and well treated with current PAP settings [id number] with low residual AHI of 1.7 events per hour. Compliance is excellent. Tolerating PAP well. Daytime symptoms and nighttime sleep quality are improved. 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 Prescription renewed for mask/supplies. He will follow-up in 1 year for annual PAP follow-up or sooner prn.     - Comprehensive DME       Chief Complaint      Chief Complaint   Patient presents with    CPAP Follow Up     Follow up justin           History of Present Illness:     Francois Fontana is a 69 year old male with obesity, T2DM, HTN, JUSTIN on CPAP who presents to the clinic for CPAP follow-up.      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 Split-night PSG completed 9/30/2015 (274.1#, BMI 36.3) revealing AHI 31.6, RDI 41.7, REM AHI-, supine AHI 70.3, oxygen obey 83%, 5.6 minutes spent less than 89%.  CPAP was titrated to 7 cm H2O and deemed optimal with residual AHI 2.2, though no REM supine. PLM's seen during study but not associated with cortical arousal.     Started CPAP following 2015 testing and using ever since.    Last seen 5/2024 and returns to clinic today for annual CPAP follow-up. No concerns today. Overall, the patient rates their experience with PAP as 8 (0 poor, 10 great). Patient is using a nasal mask. The mask is comfortable and denies any leak concern.  Denies significant oral/nasal dryness.  Pressure setting is comfortable.  No gasp arousals with the mask on.  Wife does notice some residual snoring however, unsure how frequent but not bothersome to him and not interested in adjustment.      .Current sleep schedule includes bedtime 7:00 PM with 5-minute sleep latency and wake time 3:30 AM. Estimates he is using CPAP therapy on average 6 hours a night and estimates he is sleeping about 7 hours a night.  Does feel well rested in the morning.    Respironics Dreamstation 2 Auto-PAP 9-14 cmH2O download (4/21/2025 - 5/20/2025):  30 total days of use. 0 nonuse days.  Average use 6 hours 23 minutes per day.  96.7% days with >4 hours use.  Large leak 2 seconds per day average. CPAP 90% pressure 10.9cm. AHI 1.7    SCALES:   INSOMNIA: Insomnia Severity Score: 2   SLEEPINESS: Hamilton Sleepiness Score: 6    Past medical/surgical history, family history, social history, medications and allergies were reviewed.           Physical Examination:   /69   Pulse 93   Ht 1.829 m (6')   Wt 122 kg (269 lb)   SpO2 96%   BMI 36.48 kg/m    General appearance: Awake, alert, cooperative. Well groomed. Sitting comfortably in chair. In no apparent distress.  HEENT: Head: Normocephalic, atraumatic. Eyes:Conjunctiva clear. Sclera normal. Nose: External appearance without deformity.   Pulmonary:  Able to speak easily in full sentences. No cough or wheeze.   Skin:  No rashes or significant lesions on visible skin.   Neurologic: Alert, oriented x3.   Psychiatric: Mood euthymic. Affect congruent with full range and intensity.      CC:  Delano Murray PA-C  May 21, 2025     [facility]  [address] [id number]     [facility]  [address] [address]    Chart documentation was completed, in part, with [employer] voice-recognition software. Even though reviewed, some grammatical, spelling, and word errors may remain.    20 minutes spent on day of encounter doing chart review, history and exam, documentation, and further activities as noted above

## 2025-05-26 ENCOUNTER — PATIENT OUTREACH (OUTPATIENT)
Dept: CARE COORDINATION | Facility: CLINIC | Age: 70
End: 2025-05-26
Payer: COMMERCIAL

## 2025-06-04 ENCOUNTER — PATIENT OUTREACH (OUTPATIENT)
Dept: CARE COORDINATION | Facility: CLINIC | Age: 70
End: 2025-06-04
Payer: COMMERCIAL

## 2025-06-05 DIAGNOSIS — Z12.11 COLON CANCER SCREENING: ICD-10-CM

## 2025-06-09 ENCOUNTER — PATIENT OUTREACH (OUTPATIENT)
Dept: CARE COORDINATION | Facility: CLINIC | Age: 70
End: 2025-06-09
Payer: COMMERCIAL

## 2025-06-11 DIAGNOSIS — E11.21 TYPE 2 DIABETES MELLITUS WITH DIABETIC NEPHROPATHY, WITHOUT LONG-TERM CURRENT USE OF INSULIN (H): ICD-10-CM

## 2025-06-11 DIAGNOSIS — E11.65 TYPE 2 DIABETES MELLITUS WITH HYPERGLYCEMIA, WITHOUT LONG-TERM CURRENT USE OF INSULIN (H): ICD-10-CM

## 2025-06-12 RX ORDER — PIOGLITAZONE 45 MG/1
45 TABLET ORAL DAILY
Qty: 90 TABLET | Refills: 0 | Status: SHIPPED | OUTPATIENT
Start: 2025-06-12

## 2025-06-12 NOTE — TELEPHONE ENCOUNTER
Requested Prescriptions   Pending Prescriptions Disp Refills    pioglitazone (ACTOS) 45 MG tablet [Pharmacy Med Name: PIOGLITAZONE HCL 45MG TABS] 90 tablet 0     Sig: TAKE ONE TABLET BY MOUTH ONCE DAILY       Thiazolidinedione Agents (TZDs)  Failed - 6/12/2025 10:15 AM        Failed - Patient has documented A1c within the specified period of time.     If HgbA1C is 8 or greater, it needs to be on file within the past 3 months.  If less than 8, must be on file within the past 6 months.     Recent Labs   Lab Test 11/12/24  0954   A1C 6.9*             Passed - Patient has a normal ALT within the past 12 mos.     Recent Labs   Lab Test 12/30/24  1154   ALT 32             Passed - Patient has a normal AST within the past 12 mos.      Recent Labs   Lab Test 12/30/24  1154   AST 28             Passed - Diagnosis not CHF        Passed - Medication is active on med list and the sig matches. RN to manually verify dose and sig if red X/fail.     If the protocol passes (green check), you do not need to verify med dose and sig.    A prescription matches if they are the same clinical intention.    For Example: once daily and every morning are the same.    The protocol can not identify upper and lower case letters as matching and will fail.     For Example: Take 1 tablet (50 mg) by mouth daily     TAKE 1 TABLET (50 MG) BY MOUTH DAILY    For all fails (red x), verify dose and sig.    If the refill does match what is on file, the RN can still proceed to approve the refill request.       If they do not match, route to the appropriate provider.             Passed - Has GFR on file in past 12 months and most recent value is normal        Passed - Recent (6 month) or future (90 days) visit with the authorizing provider's specialty (provided they have been seen in the past 9 months)     The patient must have completed an in-person or virtual visit within the past 6 months or has a future visit scheduled within the next 90 days with the  authorizing provider s specialty.  Urgent care and e-visits do not quality as an office visit for this protocol.          Passed - Medication indicated for associated diagnosis     Medication is associated with one or more of the following diagnoses:   - Type 2 diabetes mellitus          Passed - Patient is age 18 or older

## 2025-06-17 ENCOUNTER — TELEPHONE (OUTPATIENT)
Dept: ENDOCRINOLOGY | Facility: CLINIC | Age: 70
End: 2025-06-17
Payer: COMMERCIAL

## 2025-06-17 NOTE — TELEPHONE ENCOUNTER
Fax received from [facility] for ozempic.    Form placed in provider's in box.    Kimberlee Poole [facility] [place]  [phone]

## 2025-06-17 NOTE — TELEPHONE ENCOUNTER
I have attached the [facility] refill form for Ozempic to the Media to print and send to [employer]                   Alert/Awake

## 2025-06-17 NOTE — TELEPHONE ENCOUNTER
Form was faxed and sent to scanning.      Kimberlee Poole, [facility]  Gerardo/Ranjit

## 2025-06-19 ENCOUNTER — ORDERS ONLY (AUTO-RELEASED) (OUTPATIENT)
Dept: URGENT CARE | Facility: CLINIC | Age: 70
End: 2025-06-19
Payer: COMMERCIAL

## 2025-06-19 DIAGNOSIS — Z12.11 COLON CANCER SCREENING: ICD-10-CM

## 2025-06-21 ENCOUNTER — HEALTH MAINTENANCE LETTER (OUTPATIENT)
Age: 70
End: 2025-06-21

## 2025-06-22 DIAGNOSIS — I10 ESSENTIAL HYPERTENSION WITH GOAL BLOOD PRESSURE LESS THAN 140/90: ICD-10-CM

## 2025-06-23 RX ORDER — VALSARTAN 160 MG/1
160 TABLET ORAL DAILY
Qty: 90 TABLET | Refills: 1 | Status: SHIPPED | OUTPATIENT
Start: 2025-06-23

## 2025-07-09 ENCOUNTER — MYC MEDICAL ADVICE (OUTPATIENT)
Dept: ENDOCRINOLOGY | Facility: CLINIC | Age: 70
End: 2025-07-09
Payer: COMMERCIAL

## 2025-07-12 DIAGNOSIS — E78.5 HYPERLIPIDEMIA LDL GOAL <70: ICD-10-CM

## 2025-07-12 DIAGNOSIS — I10 ESSENTIAL HYPERTENSION WITH GOAL BLOOD PRESSURE LESS THAN 140/90: ICD-10-CM

## 2025-07-12 DIAGNOSIS — E11.21 TYPE 2 DIABETES MELLITUS WITH DIABETIC NEPHROPATHY, WITHOUT LONG-TERM CURRENT USE OF INSULIN (H): ICD-10-CM

## 2025-07-12 DIAGNOSIS — E11.65 TYPE 2 DIABETES MELLITUS WITH HYPERGLYCEMIA, WITHOUT LONG-TERM CURRENT USE OF INSULIN (H): ICD-10-CM

## 2025-07-14 RX ORDER — AMLODIPINE BESYLATE 10 MG/1
10 TABLET ORAL DAILY
Qty: 90 TABLET | Refills: 1 | OUTPATIENT
Start: 2025-07-14

## 2025-07-15 DIAGNOSIS — E11.21 TYPE 2 DIABETES MELLITUS WITH DIABETIC NEPHROPATHY, WITHOUT LONG-TERM CURRENT USE OF INSULIN (H): ICD-10-CM

## 2025-07-15 RX ORDER — SEMAGLUTIDE 1.34 MG/ML
INJECTION, SOLUTION SUBCUTANEOUS
Qty: 3 ML | Refills: 1 | Status: SHIPPED | OUTPATIENT
Start: 2025-07-15

## 2025-07-15 RX ORDER — PIOGLITAZONE 45 MG/1
45 TABLET ORAL DAILY
Qty: 90 TABLET | Refills: 0 | Status: SHIPPED | OUTPATIENT
Start: 2025-07-15

## 2025-07-15 RX ORDER — GLIPIZIDE 10 MG/1
20 TABLET, FILM COATED, EXTENDED RELEASE ORAL DAILY
Qty: 180 TABLET | Refills: 0 | Status: SHIPPED | OUTPATIENT
Start: 2025-07-15

## 2025-07-15 NOTE — TELEPHONE ENCOUNTER
Requested Prescriptions   Pending Prescriptions Disp Refills    OZEMPIC (1 MG/DOSE) 4 MG/3ML pen [Pharmacy Med Name: OZEMPIC (1 MG/DOSE) 4MG/3ML SOPN] 3 mL 2     Sig: INJECT 1 MG SUBCUTANEOUSLY EVERY 7 DAYS.       GLP-1 Agonists Protocol Failed - 7/15/2025 11:01 AM        Failed - HgbA1C in past 3 or 6 months     If HgbA1C is 8 or greater, it needs to be on file within the past 3 months.  If less than 8, must be on file within the past 6 months.     Recent Labs   Lab Test 11/12/24  0954   A1C 6.9*             Passed - Medication is active on med list and the sig matches. RN to manually verify dose and sig if red X/fail.     If the protocol passes (green check), you do not need to verify med dose and sig.    A prescription matches if they are the same clinical intention.    For Example: once daily and every morning are the same.    The protocol can not identify upper and lower case letters as matching and will fail.     For Example: Take 1 tablet (50 mg) by mouth daily     TAKE 1 TABLET (50 MG) BY MOUTH DAILY    For all fails (red x), verify dose and sig.    If the refill does match what is on file, the RN can still proceed to approve the refill request.       If they do not match, route to the appropriate provider.             Passed - Has GFR on file in past 12 months and most recent value is normal        Passed - Recent (6 month) or future (90 days) visit with the authorizing provider's specialty (provided they have been seen in the past 9 months)     The patient must have completed an in-person or virtual visit within the past 6 months or has a future visit scheduled within the next 90 days with the authorizing provider s specialty.  Urgent care and e-visits do not quality as an office visit for this protocol.          Passed - Medication indicated for associated diagnosis     Medication is associated with one or more of the following diagnoses:     Type 2 diabetes mellitus           Passed - Patient is age  18 or older          pioglitazone (ACTOS) 45 MG tablet [Pharmacy Med Name: PIOGLITAZONE HCL 45MG TABS] 90 tablet 0     Sig: TAKE ONE TABLET BY MOUTH ONCE DAILY       Thiazolidinedione Agents (TZDs)  Failed - 7/15/2025 11:01 AM        Failed - Patient has documented A1c within the specified period of time.     If HgbA1C is 8 or greater, it needs to be on file within the past 3 months.  If less than 8, must be on file within the past 6 months.     Recent Labs   Lab Test 11/12/24  0954   A1C 6.9*             Passed - Patient has a normal ALT within the past 12 mos.     Recent Labs   Lab Test 12/30/24  1154   ALT 32             Passed - Patient has a normal AST within the past 12 mos.      Recent Labs   Lab Test 12/30/24  1154   AST 28             Passed - Diagnosis not CHF        Passed - Medication is active on med list and the sig matches. RN to manually verify dose and sig if red X/fail.     If the protocol passes (green check), you do not need to verify med dose and sig.    A prescription matches if they are the same clinical intention.    For Example: once daily and every morning are the same.    The protocol can not identify upper and lower case letters as matching and will fail.     For Example: Take 1 tablet (50 mg) by mouth daily     TAKE 1 TABLET (50 MG) BY MOUTH DAILY    For all fails (red x), verify dose and sig.    If the refill does match what is on file, the RN can still proceed to approve the refill request.       If they do not match, route to the appropriate provider.             Passed - Has GFR on file in past 12 months and most recent value is normal        Passed - Recent (6 month) or future (90 days) visit with the authorizing provider's specialty (provided they have been seen in the past 9 months)     The patient must have completed an in-person or virtual visit within the past 6 months or has a future visit scheduled within the next 90 days with the authorizing provider s specialty.  Urgent  care and e-visits do not quality as an office visit for this protocol.          Passed - Medication indicated for associated diagnosis     Medication is associated with one or more of the following diagnoses:   - Type 2 diabetes mellitus          Passed - Patient is age 18 or older          glipiZIDE (GLUCOTROL XL) 10 MG 24 hr tablet [Pharmacy Med Name: GLIPIZIDE ER 10MG TB24] 180 tablet 0     Sig: TAKE TWO TABLETS BY MOUTH ONCE DAILY       Sulfonylurea Agents Failed - 7/15/2025 11:01 AM        Failed - Patient has documented A1c within the specified period of time.     If HgbA1C is 8 or greater, it needs to be on file within the past 3 months.  If less than 8, must be on file within the past 6 months.     Recent Labs   Lab Test 11/12/24  0954   A1C 6.9*             Passed - Medication is active on med list and the sig matches. RN to manually verify dose and sig if red X/fail.     If the protocol passes (green check), you do not need to verify med dose and sig.    A prescription matches if they are the same clinical intention.    For Example: once daily and every morning are the same.    The protocol can not identify upper and lower case letters as matching and will fail.     For Example: Take 1 tablet (50 mg) by mouth daily     TAKE 1 TABLET (50 MG) BY MOUTH DAILY    For all fails (red x), verify dose and sig.    If the refill does match what is on file, the RN can still proceed to approve the refill request.       If they do not match, route to the appropriate provider.             Passed - Has GFR on file in past 12 months and most recent value is normal        Passed - Recent (6 month) or future (90 days) visit with the authorizing provider's specialty (provided they have been seen in the past 9 months)     The patient must have completed an in-person or virtual visit within the past 6 months or has a future visit scheduled within the next 90 days with the authorizing provider s specialty.  Urgent care and  e-visits do not quality as an office visit for this protocol.          Passed - Medication indicated for associated diagnosis     Medication is associated with one or more of the following diagnoses:  Maturity-onset diabetes of the young   Peripheral circulatory disorder due to type 2 diabetes mellitus   Type 2 diabetes mellitus           Passed - Patient is age 18 or older        Passed - Patient has a recent creatinine (normal) within the past 12 mos.     Recent Labs   Lab Test 12/30/24  1154   CR 1.16

## 2025-07-16 RX ORDER — SIMVASTATIN 20 MG
TABLET ORAL
Qty: 30 TABLET | Refills: 0 | Status: SHIPPED | OUTPATIENT
Start: 2025-07-16

## 2025-07-17 NOTE — TELEPHONE ENCOUNTER
Attempt # 1  Called Phone # [phone]      Was Call answered? No.     Non-detailed voicemail left on July 16, 2025 9:50 AM to call clinic at: [phone].     On Call Back:     Please relay primary care provider's message. Help set up follow up with DR. Murray.      Thank you,  Taran, Triage GURINDER Church    9:50 AM 7/16/2025     
Attempt # 2  Called Phone # [phone]      Was Call answered? No     Non-detailed voicemail left on July 17, 2025 10:58 AM to call clinic at: [phone].     On Call Back:     Please relay primary care provider's message. Help set up follow up with DR. Murray.  Also sent youwhot message.      Thank you,  Taran, Triage GURINDER Church    10:58 AM 7/17/2025     
Forwarded metformin refill request to endo.     Left voicemail for patient to call clinic back, needs to schedule 6 mo follow up with PCP as well.     Summer RN 7:58 AM July 16, 2025   [facility]    
Patient follows up with endocrinology for diabetes, please forward metformin refill request to endocrinology.    I have not seen this patient since more than 6 months, patient should be seen every 6 months for chronic medical issues and medication check and labs last lipids more than 1 year ago.  I have refilled simvastatin for 1 month only, please schedule patient with any of my same-day or approval slots.  
oral

## 2025-08-02 ENCOUNTER — HEALTH MAINTENANCE LETTER (OUTPATIENT)
Age: 70
End: 2025-08-02